# Patient Record
Sex: FEMALE | Race: WHITE | NOT HISPANIC OR LATINO | Employment: OTHER | URBAN - NONMETROPOLITAN AREA
[De-identification: names, ages, dates, MRNs, and addresses within clinical notes are randomized per-mention and may not be internally consistent; named-entity substitution may affect disease eponyms.]

---

## 2017-01-04 ENCOUNTER — TELEPHONE (OUTPATIENT)
Dept: MEDICAL GROUP | Facility: CLINIC | Age: 67
End: 2017-01-04

## 2017-01-04 ENCOUNTER — TELEMEDICINE2 (OUTPATIENT)
Dept: MEDICAL GROUP | Age: 67
End: 2017-01-04
Payer: COMMERCIAL

## 2017-01-04 ENCOUNTER — TELEMEDICINE ORIGINATING SITE VISIT (OUTPATIENT)
Dept: MEDICAL GROUP | Facility: CLINIC | Age: 67
End: 2017-01-04
Payer: COMMERCIAL

## 2017-01-04 VITALS
SYSTOLIC BLOOD PRESSURE: 120 MMHG | WEIGHT: 172.8 LBS | RESPIRATION RATE: 16 BRPM | HEART RATE: 68 BPM | OXYGEN SATURATION: 92 % | BODY MASS INDEX: 28.79 KG/M2 | DIASTOLIC BLOOD PRESSURE: 78 MMHG | TEMPERATURE: 97.3 F | HEIGHT: 65 IN

## 2017-01-04 DIAGNOSIS — M51.26 LUMBAR DISCOGENIC PAIN SYNDROME: ICD-10-CM

## 2017-01-04 DIAGNOSIS — E03.4 HYPOTHYROIDISM DUE TO ACQUIRED ATROPHY OF THYROID: ICD-10-CM

## 2017-01-04 DIAGNOSIS — I27.20 PULMONARY HYPERTENSION (HCC): ICD-10-CM

## 2017-01-04 DIAGNOSIS — Z13.220 SCREENING CHOLESTEROL LEVEL: ICD-10-CM

## 2017-01-04 PROCEDURE — 99214 OFFICE O/P EST MOD 30 MIN: CPT | Mod: GT | Performed by: INTERNAL MEDICINE

## 2017-01-04 RX ORDER — TEMAZEPAM 30 MG/1
30 CAPSULE ORAL NIGHTLY PRN
Qty: 30 CAP | Status: CANCELLED | OUTPATIENT
Start: 2017-01-04

## 2017-01-04 RX ORDER — TEMAZEPAM 30 MG/1
30 CAPSULE ORAL NIGHTLY PRN
Qty: 30 CAP | Refills: 5 | Status: CANCELLED | OUTPATIENT
Start: 2017-01-04

## 2017-01-04 RX ORDER — POTASSIUM CHLORIDE 750 MG/1
TABLET, FILM COATED, EXTENDED RELEASE ORAL
COMMUNITY
Start: 2016-12-22 | End: 2019-05-15

## 2017-01-04 RX ORDER — RIVAROXABAN 15 MG/1
TABLET, FILM COATED ORAL
COMMUNITY
Start: 2016-11-12 | End: 2017-12-28 | Stop reason: SDUPTHER

## 2017-01-04 NOTE — MR AVS SNAPSHOT
"        Kaylynn Polo   2017 8:30 AM   Telemedicine2   MRN: [id number]    Department:  [address] [place] [address]   Dept Phone:  [phone]    Description:  Female : 1950   Provider:  Cynthia West M.D.; PENNIE FLETCHER           Reason for Visit     Follow-Up     Referral Needed Cardio / Pain / Pulm - [facility]      Allergies as of 2017     Allergen Noted Reactions    Latex 2016   Anaphylaxis    Blisters on skin    Tape 2016       Blisters on skin    Warfarin Sodium 2016   Hives      You were diagnosed with     Pulmonary hypertension (HCC)   [882461]       Hypothyroidism due to acquired atrophy of thyroid   [8829286]       Lumbar discogenic pain syndrome   [415543]       Screening cholesterol level   [084163]         Vital Signs     Blood Pressure Pulse Temperature Respirations Height Weight    120/78 mmHg 68 36.3 °C (97.3 °F) 16 1.651 m (5' 5\") 78.382 kg (172 lb 12.8 oz)    Body Mass Index Oxygen Saturation Smoking Status             28.76 kg/m2 92% Never Smoker          Basic Information     Date Of Birth Sex Race Ethnicity Preferred Language    1950 Female White Non- English      Your appointments     2017  8:00 AM   Telemedicine Clinic Established Pt with Cynthia West M.D., PENNIE FLETCHER   [address] [id number] [phone]              Problem List              ICD-10-CM Priority Class Noted - Resolved    Pulmonary hypertension (HCC) I27.2   2016 - Present    Rheumatoid arthritis (HCC) M06.9   2016 - Present    Hypertension I10   2016 - Present    Neuropathy (HCC) G62.9   2016 - Present    Iron (Fe) deficiency anemia D50.9   2016 - Present    Vitamin B12 deficiency E53.8   2016 - Present    Hypothyroidism due to acquired atrophy of thyroid E03.4   2016 - Present    Pulmonary embolism (HCC) I26.99   2016 - Present    Multiple thyroid nodules E04.2   " 6/13/2016 - Present    Lumbar discogenic pain syndrome M51.26   7/27/2016 - Present    Dysphagia R13.10   7/27/2016 - Present      Health Maintenance        Date Due Completion Dates    IMM DTaP/Tdap/Td Vaccine (1 - Tdap) 11/17/1971 ---    PAP SMEAR 11/17/1973 ---    MAMMOGRAM 11/17/1992 ---    COLONOSCOPY 11/17/2002 ---    IMM ZOSTER VACCINE 11/17/2012 ---    IMM INFLUENZA (1) 9/1/2016 ---            Current Immunizations     No immunizations on file.      Below and/or attached are the medications your provider expects you to take. Review all of your home medications and newly ordered medications with your provider and/or pharmacist. Follow medication instructions as directed by your provider and/or pharmacist. Please keep your medication list with you and share with your provider. Update the information when medications are discontinued, doses are changed, or new medications (including over-the-counter products) are added; and carry medication information at all times in the event of emergency situations     Allergies:  LATEX - Anaphylaxis     TAPE - (reactions not documented)     WARFARIN SODIUM - Hives               Medications  Valid as of: January 04, 2017 -  9:13 AM    Generic Name Brand Name Tablet Size Instructions for use    Ascorbic Acid (Tab) Vitamin C 1000 MG Take 1 Tab by mouth every day.        Calcium (Tab) OS-LUIS ARMANDO 500 500 MG Take 1,000 mg by mouth 2 times a day, with meals.        Cholecalciferol (Cap) Vitamin D3 1000 UNITS Take 1 Cap by mouth every day.        Cyanocobalamin (Tab) vitamin B-12 1000 MCG Take 1,000 mcg by mouth every day.        Dermatological Products, Misc. (Emulsion) EPICERAM          Digoxin (Tab) LANOXIN 125 MCG Take 125 mcg by mouth every day.        DiltiaZEM HCl (Tab) CARDIZEM 30 MG Take 30 mg by mouth 4 times a day.        Ferrous Sulfate (Tab) Iron 325 (65 FE) MG Take 1 Tab by mouth every day.        Folic Acid (Tab) FOLVITE 1 MG Take 1 mg by mouth every day.        Folic  Acid-Vit B6-Vit B12 (Tab) VIRT-GILLIAN 2.5-25-1 MG         Furosemide (Tab) LASIX 40 MG Take 40 mg by mouth 3 times a day.        Gabapentin (Tab) NEURONTIN 600 MG         Hydroxychloroquine Sulfate (Tab) PLAQUENIL 200 MG Take 200 mg by mouth 2 times a day.        Influenza Vac Typ A&B Surf Ant (Suspension) FLUVIRIN  ADM 0.5ML IM UTD        Leflunomide (Tab) ARAVA 20 MG Take 20 mg by mouth every day.        Levothyroxine Sodium (Tab) SYNTHROID 137 MCG         Macitentan (Tab) Macitentan 10 MG Take 1 Tab by mouth every day.        NON SPECIFIED   Rx continuous O2 at 2L n/c   New O2 concentrator  Lowest O2 sat 82%        NON SPECIFIED   Rx continuous O2 at 2L n/c  New O2 concentrator  Lowest O2 sat 82%  SOB without use of O2        Pantoprazole Sodium (Tablet Delayed Response) PROTONIX 40 MG Take 40 mg by mouth 2 times a day.        Potassium Chloride (Tab CR) KLOR-CON 10 MEQ         Potassium Chloride Jennifer CR (Tab CR) K-DUR 20 MEQ Take 20 mEq by mouth every day.        Rivaroxaban (Tab) XARELTO 20 MG Take 20 mg by mouth with dinner.        Rivaroxaban (Tab) XARELTO 15 MG         Tadalafil (PAH) (Tab) Tadalafil (PAH) 20 MG Take 2 Tabs by mouth every day.        Temazepam (Cap) RESTORIL 30 MG Take 30 mg by mouth at bedtime as needed for Sleep.        TraMADol HCl (Tab) ULTRAM 50 MG Take 50 mg by mouth every four hours as needed.        Treprostinil   Inhale 1.74 mg by mouth. Inhale 12 puffs orally four times daily   Indications: 1.74mg/2.9ml        Triamcinolone Acetonide (Cream) KENALOG 0.1 %         Umeclidinium-Vilanterol (AEROSOL POWDER, BREATH ACTIVATED) ANORO ELLIPTA 62.5-25 MCG/INH         Zinc   Take 1 Tab by mouth every day.        .                 Medicines prescribed today were sent to:     [facility] - [address] [facility] [address]    Phone: [phone] Fax: [phone]    Open 24 Hours?: No      Medication refill instructions:         If your prescription bottle indicates you have medication refills left, it is not necessary to call your provider’s office. Please contact your pharmacy and they will refill your medication.    If your prescription bottle indicates you do not have any refills left, you may request refills at any time through one of the following ways: The online [employer] system (except Urgent Care), by calling your provider’s office, or by asking your pharmacy to contact your provider’s office with a refill request. Medication refills are processed only during regular business hours and may not be available until the next business day. Your provider may request additional information or to have a follow-up visit with you prior to refilling your medication.   *Please Note: Medication refills are assigned a new Rx number when refilled electronically. Your pharmacy may indicate that no refills were authorized even though a new prescription for the same medication is available at the pharmacy. Please request the medicine by name with the pharmacy before contacting your provider for a refill.        Your To Do List     Future Labs/Procedures Complete By Expires    LIPID PROFILE  As directed 1/5/2018         RealSelf Access Code: Activation code not generated  Current RealSelf Status: Active

## 2017-01-04 NOTE — TELEPHONE ENCOUNTER
Patient called with names of specialists at [facility] for you to refer her to:  Dr. Chintan Latham Orthopedics and Dr. Octaviano Perdomo Cardio/Pulmonary

## 2017-01-05 DIAGNOSIS — M48.061 FORAMINAL STENOSIS OF LUMBAR REGION: ICD-10-CM

## 2017-01-09 NOTE — PROGRESS NOTES
CC: Follow-up low back pain, pulmonary hypertension.    HPI:   Kaylynn presents today with the following.    1. Pulmonary hypertension (HCC)  Patient regularly follows up with [facility]. Patient on transplant list. Patient undergoing a drug study program. Patient stable on Xarelto. Oxygen dependent. Complains of mild shortness of breath which is chronic.  Appointment on January 30. Stable. No new changes in plan of care    2. Hypothyroidism due to acquired atrophy of thyroid  Patient currently taking Synthroid 137 MCG daily. Requesting lab work. Complains of fatigue.    3. Lumbar discogenic pain syndrome  Diagnosis of foraminal stenosis. Moderately severe. Patient underwent pre-evaluation for possible surgical intervention. Patient completed EKG and echocardiogram however given the results, surgery was declined by anesthesiology. Patient is currently followed by pain management physician, Dr. Reid in [place]. Patient's pain level has increased steadily and is interfering with her daily activities. Patient is requesting a 2nd opinion for surgical intervention.    4. Screening cholesterol level  No labs in quite some time. Not on cholesterol medications.      Patient Active Problem List    Diagnosis Date Noted   • Lumbar discogenic pain syndrome 07/27/2016   • Dysphagia 07/27/2016   • Pulmonary hypertension (HCC) 06/13/2016   • Rheumatoid arthritis (HCC) 06/13/2016   • Hypertension 06/13/2016   • Neuropathy (HCC) 06/13/2016   • Iron (Fe) deficiency anemia 06/13/2016   • Vitamin B12 deficiency 06/13/2016   • Hypothyroidism due to acquired atrophy of thyroid 06/13/2016   • Pulmonary embolism (HCC) 06/13/2016   • Multiple thyroid nodules 06/13/2016       Current Outpatient Prescriptions   Medication Sig Dispense Refill   • potassium chloride ER (KLOR-CON) 10 MEQ tablet      • XARELTO 15 MG Tab tablet      • Dermatological Products, Misc. (EPICERAM) Emulsion      • triamcinolone acetonide (KENALOG) 0.1 % Cream      •  levothyroxine (SYNTHROID) 137 MCG Tab      • VIRT-GILLIAN 2.5-25-1 MG Tab      • gabapentin (NEURONTIN) 600 MG tablet      • ANORO ELLIPTA 62.5-25 MCG/INH AEROSOL POWDER, BREATH ACTIVATED      • Tadalafil, PAH, 20 MG Tab Take 2 Tabs by mouth every day.     • calcium carbonate (OS-LUIS ARMANDO 500) 500 MG Tab Take 1,000 mg by mouth 2 times a day, with meals.     • Cholecalciferol (VITAMIN D3) 1000 UNITS Cap Take 1 Cap by mouth every day.     • digoxin (LANOXIN) 125 MCG Tab Take 125 mcg by mouth every day.     • diltiazem (CARDIZEM) 30 MG Tab Take 30 mg by mouth 4 times a day.     • folic acid (FOLVITE) 1 MG Tab Take 1 mg by mouth every day.     • furosemide (LASIX) 40 MG Tab Take 40 mg by mouth 3 times a day.     • hydroxychloroquine (PLAQUENIL) 200 MG Tab Take 200 mg by mouth 2 times a day.     • Ferrous Sulfate (IRON) 325 (65 FE) MG Tab Take 1 Tab by mouth every day.     • leflunomide (ARAVA) 20 MG Tab Take 20 mg by mouth every day.     • Macitentan 10 MG Tab Take 1 Tab by mouth every day.     • pantoprazole (PROTONIX) 40 MG Tablet Delayed Response Take 40 mg by mouth 2 times a day.     • potassium chloride SA (K-DUR) 20 MEQ Tab CR Take 20 mEq by mouth every day.     • temazepam (RESTORIL) 30 MG capsule Take 30 mg by mouth at bedtime as needed for Sleep.     • tramadol (ULTRAM) 50 MG Tab Take 50 mg by mouth every four hours as needed.     • Cyanocobalamin (VITAMIN B-12) 1000 MCG Tab Take 1,000 mcg by mouth every day.     • Ascorbic Acid (VITAMIN C) 1000 MG Tab Take 1 Tab by mouth every day.     • rivaroxaban (XARELTO) 20 MG Tab tablet Take 20 mg by mouth with dinner.     • Treprostinil (TYVASO INH) Inhale 1.74 mg by mouth. Inhale 12 puffs orally four times daily   Indications: 1.74mg/2.9ml     • FLUVIRIN Suspension ADM 0.5ML IM UTD  0   • NON SPECIFIED Rx continuous O2 at 2L n/c  New O2 concentrator  Lowest O2 sat 82%  SOB without use of O2 1 Each 11   • NON SPECIFIED Rx continuous O2 at 2L n/c   New O2 concentrator  Lowest O2  "sat 82% 1 Device 6   • CHELATED ZINC PO Take 1 Tab by mouth every day.       No current facility-administered medications for this visit.         Allergies as of 01/04/2017 - Pedro Luis as Reviewed 01/04/2017   Allergen Reaction Noted   • Latex Anaphylaxis 06/13/2016   • Tape  06/13/2016   • Warfarin sodium Hives 06/13/2016        ROS: As per HPI. Low back pain, lower extremity radiculopathy. Occasional paresthesias lower extremities bilaterally. Fatigue.    /78 mmHg  Pulse 68  Temp(Src) 36.3 °C (97.3 °F)  Resp 16  Ht 1.651 m (5' 5\")  Wt 78.382 kg (172 lb 12.8 oz)  BMI 28.76 kg/m2  SpO2 92%    Physical Exam:  Gen:         Alert and oriented, No apparent distress.  Neck:        No Lymphadenopathy or Bruits.  Lungs:     Mildly decreased breath sounds throughout, noted air excursion. Minimal crackles at right base   CV:          Regular rate and rhythm. No murmurs, rubs or gallops.  Abd:         Soft non tender, non distended. Normal active bowel sounds.  No  Hepatosplenomegaly, No pulsatile masses.                   Ext:          No clubbing, cyanosis, edema.      Assessment and Plan.   66 y.o. female with the following issues.    1. Pulmonary hypertension (HCC)  [facility] records reviewed  Keep follow up appointment in January  Continue plan of care    2. Hypothyroidism due to acquired atrophy of thyroid    - THYROID PANEL WITH TSH    3. Lumbar discogenic pain syndrome  Referral to be placed for reevaluation per orthopedics/neurosurgery.  Orthopedic records from [place] reviewed. Available in media  Continue with pain management and [facility]    4. Screening cholesterol level    - LIPID PROFILE; Future      "

## 2017-01-11 ENCOUNTER — TELEPHONE (OUTPATIENT)
Dept: MEDICAL GROUP | Facility: CLINIC | Age: 67
End: 2017-01-11

## 2017-01-12 DIAGNOSIS — I27.20 PULMONARY HYPERTENSION (HCC): ICD-10-CM

## 2017-01-12 RX ORDER — TEMAZEPAM 30 MG/1
30 CAPSULE ORAL NIGHTLY PRN
Qty: 30 CAP | Status: CANCELLED | OUTPATIENT
Start: 2017-01-12

## 2017-01-12 NOTE — TELEPHONE ENCOUNTER
Patient needs referral for Pulmonology through [facility] Dr. Octaviano Perdomo [phone].   Please send referral ASAP.  Patient says thank you!

## 2017-01-26 NOTE — TELEPHONE ENCOUNTER
Patient is requesting refills of Levothyroxine and Temazepam be sent to [facility].  She has lab results in  for the TSH

## 2017-01-27 RX ORDER — TEMAZEPAM 30 MG/1
30 CAPSULE ORAL NIGHTLY PRN
Qty: 90 CAP | Refills: 0 | Status: SHIPPED
Start: 2017-01-27 | End: 2017-02-17 | Stop reason: SDUPTHER

## 2017-01-27 RX ORDER — LEVOTHYROXINE SODIUM 137 UG/1
137 TABLET ORAL
Qty: 90 TAB | Refills: 0 | Status: SHIPPED | OUTPATIENT
Start: 2017-01-27 | End: 2017-02-27 | Stop reason: SDUPTHER

## 2017-02-07 RX ORDER — LEVOTHYROXINE SODIUM 137 UG/1
TABLET ORAL
OUTPATIENT
Start: 2017-02-07

## 2017-02-08 ENCOUNTER — TELEPHONE (OUTPATIENT)
Dept: MEDICAL GROUP | Facility: PHYSICIAN GROUP | Age: 67
End: 2017-02-08

## 2017-02-09 RX ORDER — LEVOTHYROXINE SODIUM 137 UG/1
TABLET ORAL
OUTPATIENT
Start: 2017-02-09

## 2017-02-13 RX ORDER — TEMAZEPAM 30 MG/1
30 CAPSULE ORAL NIGHTLY PRN
Qty: 90 CAP | Refills: 0 | Status: CANCELLED
Start: 2017-02-13

## 2017-02-17 RX ORDER — TEMAZEPAM 30 MG/1
30 CAPSULE ORAL NIGHTLY PRN
Qty: 90 CAP | Refills: 0 | Status: SHIPPED
Start: 2017-02-17 | End: 2017-04-21 | Stop reason: SDUPTHER

## 2017-02-22 NOTE — TELEPHONE ENCOUNTER
Patient had labs drawn in January, her results are in .  She is requesting you send a year's supply of Levothyroxine refill to her [facility] pharmacy.

## 2017-02-23 RX ORDER — LEVOTHYROXINE SODIUM 137 UG/1
137 TABLET ORAL
Qty: 90 TAB | Refills: 0 | OUTPATIENT
Start: 2017-02-23

## 2017-02-23 RX ORDER — ALPRAZOLAM 0.5 MG/1
0.05 TABLET ORAL
COMMUNITY
Start: 2017-01-04 | End: 2017-04-05 | Stop reason: CLARIF

## 2017-02-27 RX ORDER — LEVOTHYROXINE SODIUM 137 UG/1
137 TABLET ORAL
Qty: 90 TAB | Refills: 0 | Status: SHIPPED | OUTPATIENT
Start: 2017-02-27 | End: 2017-05-03 | Stop reason: SDUPTHER

## 2017-03-20 ENCOUNTER — NON-PROVIDER VISIT (OUTPATIENT)
Dept: MEDICAL GROUP | Facility: CLINIC | Age: 67
End: 2017-03-20
Payer: COMMERCIAL

## 2017-03-20 ENCOUNTER — TELEPHONE (OUTPATIENT)
Dept: MEDICAL GROUP | Facility: CLINIC | Age: 67
End: 2017-03-20

## 2017-03-20 DIAGNOSIS — D50.0 IRON DEFICIENCY ANEMIA SECONDARY TO BLOOD LOSS (CHRONIC): ICD-10-CM

## 2017-03-20 DIAGNOSIS — E03.4 HYPOTHYROIDISM DUE TO ACQUIRED ATROPHY OF THYROID: ICD-10-CM

## 2017-03-20 PROCEDURE — 36415 COLL VENOUS BLD VENIPUNCTURE: CPT | Performed by: FAMILY MEDICINE

## 2017-03-20 NOTE — TELEPHONE ENCOUNTER
Please see  for scanned in copy of lab orders from Dr. Agustin Waldrop.  He has ordered a CBC with differential and Ferritin, diag code d50.0.  Please enter these orders into [facility] so we can draw patient

## 2017-03-20 NOTE — PROGRESS NOTES
Kaylynn Polo is a 66 y.o. female here for a non-provider visit for Venipuncture    If abnormal was an in office provider notified today (if so, indicate provider)? Yes, Dr. West and Dr. Agustin Waldrop    Routed to PCP? Yes    Venipuncture drawn, packaged and sent with [facility]

## 2017-03-20 NOTE — MR AVS SNAPSHOT
Kaylynn Polo   3/20/2017 11:00 AM   Non-Provider Visit   MRN: [id number]    Department:  [facility]   Dept Phone:  [phone]    Description:  Female : 1950   Provider:  JUSTINE BUTTS           Reason for Visit     Other Venipuncture      Allergies as of 3/20/2017     Allergen Noted Reactions    Latex 2016   Anaphylaxis    Blisters on skin    Tape 2016       Blisters on skin    Warfarin Sodium 2016   Hives      You were diagnosed with     Hypothyroidism due to acquired atrophy of thyroid   [9387237]         Vital Signs     Smoking Status                   Never Smoker            Basic Information     Date Of Birth Sex Race Ethnicity Preferred Language    1950 Female White Non- English      Your appointments     Mar 20, 2017 11:00 AM   Non Provider 1 with JUSTINE BUTTS   [facility] (--)    [address] [facility] [address]   [phone]           You will be receiving a confirmation call a few days before your appointment from our automated call confirmation system.            2017  8:00 AM   Telemedicine Clinic Established Pt with Cynthia West M.D., [facility] [address] [place] [id number] [phone]              Problem List              ICD-10-CM Priority Class Noted - Resolved    Pulmonary hypertension (CMS-HCC) I27.2   2016 - Present    Rheumatoid arthritis (CMS-HCC) M06.9   2016 - Present    Hypertension I10   2016 - Present    Neuropathy (CMS-HCC) G62.9   2016 - Present    Iron (Fe) deficiency anemia D50.9   2016 - Present    Vitamin B12 deficiency E53.8   2016 - Present    Hypothyroidism due to acquired atrophy of thyroid E03.4   2016 - Present    Pulmonary embolism (CMS-HCC) I26.99   2016 - Present    Multiple thyroid nodules E04.2   2016 - Present    Lumbar discogenic pain syndrome M51.26   2016 - Present    Dysphagia R13.10   7/27/2016 - Present      Health Maintenance        Date Due Completion Dates    IMM DTaP/Tdap/Td Vaccine (1 - Tdap) 11/17/1971 ---    PAP SMEAR 11/17/1973 ---    MAMMOGRAM 11/17/1992 ---    COLONOSCOPY 11/17/2002 ---    IMM ZOSTER VACCINE 11/17/2012 ---    IMM INFLUENZA (1) 9/1/2016 ---            Current Immunizations     No immunizations on file.      Below and/or attached are the medications your provider expects you to take. Review all of your home medications and newly ordered medications with your provider and/or pharmacist. Follow medication instructions as directed by your provider and/or pharmacist. Please keep your medication list with you and share with your provider. Update the information when medications are discontinued, doses are changed, or new medications (including over-the-counter products) are added; and carry medication information at all times in the event of emergency situations     Allergies:  LATEX - Anaphylaxis     TAPE - (reactions not documented)     WARFARIN SODIUM - Hives               Medications  Valid as of: March 20, 2017 - 10:54 AM    Generic Name Brand Name Tablet Size Instructions for use    ALPRAZolam (Tab) XANAX 0.5 MG Take 0.05 mg by mouth.        Ascorbic Acid (Tab) Vitamin C 1000 MG Take 1 Tab by mouth every day.        Calcium (Tab) OS-LUIS ARMANDO 500 500 MG Take 1,000 mg by mouth 2 times a day, with meals.        Cholecalciferol (Cap) Vitamin D3 1000 UNITS Take 1 Cap by mouth every day.        Cyanocobalamin (Tab) vitamin B-12 1000 MCG Take 1,000 mcg by mouth every day.        Dermatological Products, Misc. (Emulsion) EPICERAM          Digoxin (Tab) LANOXIN 125 MCG Take 125 mcg by mouth every day.        DiltiaZEM HCl (Tab) CARDIZEM 30 MG Take 30 mg by mouth 4 times a day.        Ferrous Sulfate (Tab) Iron 325 (65 FE) MG Take 1 Tab by mouth every day.        Folic Acid (Tab) FOLVITE 1 MG Take 1 mg by mouth every day.        Folic Acid-Vit B6-Vit B12 (Tab)  VIRT-GILLIAN 2.5-25-1 MG         Furosemide (Tab) LASIX 40 MG Take 40 mg by mouth 3 times a day.        Gabapentin (Tab) NEURONTIN 600 MG         Hydroxychloroquine Sulfate (Tab) PLAQUENIL 200 MG Take 200 mg by mouth 2 times a day.        Influenza Vac Typ A&B Surf Ant (Suspension) FLUVIRIN  ADM 0.5ML IM UTD        Leflunomide (Tab) ARAVA 20 MG Take 20 mg by mouth every day.        Levothyroxine Sodium (Tab) SYNTHROID 137 MCG Take 1 Tab by mouth Every morning on an empty stomach.        Macitentan (Tab) Macitentan 10 MG Take 1 Tab by mouth every day.        NON SPECIFIED   Rx continuous O2 at 2L n/c   New O2 concentrator  Lowest O2 sat 82%        NON SPECIFIED   Rx continuous O2 at 2L n/c  New O2 concentrator  Lowest O2 sat 82%  SOB without use of O2        Pantoprazole Sodium (Tablet Delayed Response) PROTONIX 40 MG Take 40 mg by mouth 2 times a day.        Potassium Chloride (Tab CR) KLOR-CON 10 MEQ         Potassium Chloride Jennifer CR (Tab CR) Kdur 20 MEQ Take 20 mEq by mouth every day.        Rivaroxaban (Tab) XARELTO 20 MG Take 20 mg by mouth with dinner.        Rivaroxaban (Tab) XARELTO 15 MG         Tadalafil (PAH) (Tab) Tadalafil (PAH) 20 MG Take 2 Tabs by mouth every day.        Temazepam (Cap) RESTORIL 30 MG Take 1 Cap by mouth at bedtime as needed for Sleep.        TraMADol HCl (Tab) ULTRAM 50 MG Take 50 mg by mouth every four hours as needed.        Treprostinil   Inhale 1.74 mg by mouth. Inhale 12 puffs orally four times daily   Indications: 1.74mg/2.9ml        Triamcinolone Acetonide (Cream) KENALOG 0.1 %         Umeclidinium-Vilanterol (AEROSOL POWDER, BREATH ACTIVATED) ANORO ELLIPTA 62.5-25 MCG/INH         Zinc   Take 1 Tab by mouth every day.        .                 Medicines prescribed today were sent to:     [facility] - [address] [facility] [address]    Phone: [phone] Fax: [phone]    Open 24 Hours?: No      Medication  refill instructions:       If your prescription bottle indicates you have medication refills left, it is not necessary to call your provider’s office. Please contact your pharmacy and they will refill your medication.    If your prescription bottle indicates you do not have any refills left, you may request refills at any time through one of the following ways: The online [email or web address] system (except Urgent Care), by calling your provider’s office, or by asking your pharmacy to contact your provider’s office with a refill request. Medication refills are processed only during regular business hours and may not be available until the next business day. Your provider may request additional information or to have a follow-up visit with you prior to refilling your medication.   *Please Note: Medication refills are assigned a new Rx number when refilled electronically. Your pharmacy may indicate that no refills were authorized even though a new prescription for the same medication is available at the pharmacy. Please request the medicine by name with the pharmacy before contacting your provider for a refill.           [email or web address] Access Code: Activation code not generated  Current [email or web address] Status: Active

## 2017-04-05 ENCOUNTER — NON-PROVIDER VISIT (OUTPATIENT)
Dept: MEDICAL GROUP | Facility: CLINIC | Age: 67
End: 2017-04-05
Payer: COMMERCIAL

## 2017-04-05 ENCOUNTER — APPOINTMENT (OUTPATIENT)
Dept: RADIOLOGY | Facility: IMAGING CENTER | Age: 67
End: 2017-04-05
Attending: INTERNAL MEDICINE
Payer: COMMERCIAL

## 2017-04-05 ENCOUNTER — TELEMEDICINE ORIGINATING SITE VISIT (OUTPATIENT)
Dept: MEDICAL GROUP | Facility: CLINIC | Age: 67
End: 2017-04-05
Payer: COMMERCIAL

## 2017-04-05 ENCOUNTER — TELEMEDICINE2 (OUTPATIENT)
Dept: MEDICAL GROUP | Age: 67
End: 2017-04-05
Payer: COMMERCIAL

## 2017-04-05 VITALS
TEMPERATURE: 98.6 F | DIASTOLIC BLOOD PRESSURE: 76 MMHG | HEART RATE: 82 BPM | HEIGHT: 65 IN | OXYGEN SATURATION: 87 % | WEIGHT: 169.9 LBS | RESPIRATION RATE: 20 BRPM | SYSTOLIC BLOOD PRESSURE: 136 MMHG | BODY MASS INDEX: 28.31 KG/M2

## 2017-04-05 DIAGNOSIS — J20.1 ACUTE BRONCHITIS DUE TO HAEMOPHILUS INFLUENZAE: ICD-10-CM

## 2017-04-05 DIAGNOSIS — I27.20 PULMONARY HYPERTENSION (HCC): ICD-10-CM

## 2017-04-05 DIAGNOSIS — R06.02 SOB (SHORTNESS OF BREATH): ICD-10-CM

## 2017-04-05 DIAGNOSIS — I10 ESSENTIAL HYPERTENSION: ICD-10-CM

## 2017-04-05 DIAGNOSIS — G62.9 NEUROPATHY: ICD-10-CM

## 2017-04-05 LAB — EKG 4674: NORMAL

## 2017-04-05 PROCEDURE — 99214 OFFICE O/P EST MOD 30 MIN: CPT | Mod: GT | Performed by: INTERNAL MEDICINE

## 2017-04-05 PROCEDURE — 71020 DX-CHEST-2 VIEWS: CPT | Mod: TC | Performed by: FAMILY MEDICINE

## 2017-04-05 PROCEDURE — 93005 ELECTROCARDIOGRAM TRACING: CPT | Performed by: FAMILY MEDICINE

## 2017-04-05 RX ORDER — ALBUTEROL SULFATE 90 UG/1
2 AEROSOL, METERED RESPIRATORY (INHALATION) EVERY 6 HOURS PRN
Qty: 8.5 G | Refills: 1 | Status: SHIPPED | OUTPATIENT
Start: 2017-04-05 | End: 2019-04-24

## 2017-04-05 RX ORDER — AMOXICILLIN 875 MG/1
875 TABLET, COATED ORAL 2 TIMES DAILY
Qty: 14 TAB | Refills: 0 | Status: SHIPPED | OUTPATIENT
Start: 2017-04-05 | End: 2019-05-15

## 2017-04-05 NOTE — MR AVS SNAPSHOT
Kaylynn Polo   2017 9:30 AM   Non-Provider Visit   MRN: [id number]    Department:  [facility]   Dept Phone:  [phone]    Description:  Female : 1950   Provider:  JUSTINE BUTTS           Reason for Visit     Other EKG      Allergies as of 2017     Allergen Noted Reactions    Latex 2016   Anaphylaxis    Blisters on skin    Tape 2016       Blisters on skin    Warfarin Sodium 2016   Hives      You were diagnosed with     Essential hypertension   [7085896]         Vital Signs     Smoking Status                   Never Smoker            Basic Information     Date Of Birth Sex Race Ethnicity Preferred Language    1950 Female White Non- English      Problem List              ICD-10-CM Priority Class Noted - Resolved    Pulmonary hypertension (CMS-HCC) I27.2   2016 - Present    Rheumatoid arthritis (CMS-HCC) M06.9   2016 - Present    Hypertension I10   2016 - Present    Neuropathy (CMS-HCC) G62.9   2016 - Present    Iron (Fe) deficiency anemia D50.9   2016 - Present    Vitamin B12 deficiency E53.8   2016 - Present    Hypothyroidism due to acquired atrophy of thyroid E03.4   2016 - Present    Pulmonary embolism (CMS-HCC) I26.99   2016 - Present    Multiple thyroid nodules E04.2   2016 - Present    Lumbar discogenic pain syndrome M51.26   2016 - Present    Dysphagia R13.10   2016 - Present    SOB (shortness of breath) R06.02   2017 - Present    Acute bronchitis due to Haemophilus influenzae J20.1   2017 - Present      Health Maintenance        Date Due Completion Dates    IMM DTaP/Tdap/Td Vaccine (1 - Tdap) 1971 ---    PAP SMEAR 1973 ---    MAMMOGRAM 1992 ---    COLONOSCOPY 2002 ---    IMM ZOSTER VACCINE 2012 ---            Current Immunizations     No immunizations on file.      Below and/or attached are the medications your provider expects you to take. Review all of your  home medications and newly ordered medications with your provider and/or pharmacist. Follow medication instructions as directed by your provider and/or pharmacist. Please keep your medication list with you and share with your provider. Update the information when medications are discontinued, doses are changed, or new medications (including over-the-counter products) are added; and carry medication information at all times in the event of emergency situations     Allergies:  LATEX - Anaphylaxis     TAPE - (reactions not documented)     WARFARIN SODIUM - Hives               Medications  Valid as of: April 05, 2017 -  9:38 AM    Generic Name Brand Name Tablet Size Instructions for use    Amoxicillin (Tab) AMOXIL 875 MG Take 1 Tab by mouth 2 times a day.        Ascorbic Acid (Tab) Vitamin C 1000 MG Take 1 Tab by mouth every day.        Calcium (Tab) OS-LUIS ARMANDO 500 500 MG Take 1,000 mg by mouth 2 times a day, with meals.        Cholecalciferol (Cap) Vitamin D3 1000 UNITS Take 1 Cap by mouth every day.        Cyanocobalamin (Tab) vitamin B-12 1000 MCG Take 1,000 mcg by mouth every day.        Digoxin (Tab) LANOXIN 125 MCG Take 125 mcg by mouth every day.        DiltiaZEM HCl (Tab) CARDIZEM 30 MG Take 30 mg by mouth 4 times a day.        Ferrous Sulfate (Tab) Iron 325 (65 FE) MG Take 1 Tab by mouth every day.        Folic Acid (Tab) FOLVITE 1 MG Take 1 mg by mouth every day.        Folic Acid-Vit B6-Vit B12 (Tab) VIRT-GILLIAN 2.5-25-1 MG         Furosemide (Tab) LASIX 40 MG Take 40 mg by mouth 3 times a day.        Gabapentin (Tab) NEURONTIN 600 MG         Hydroxychloroquine Sulfate (Tab) PLAQUENIL 200 MG Take 200 mg by mouth 2 times a day.        Leflunomide (Tab) ARAVA 20 MG Take 20 mg by mouth every day.        Levothyroxine Sodium (Tab) SYNTHROID 137 MCG Take 1 Tab by mouth Every morning on an empty stomach.        Macitentan (Tab) Macitentan 10 MG Take 1 Tab by mouth every day.        NON SPECIFIED   Rx continuous O2 at 2L  n/c   New O2 concentrator  Lowest O2 sat 82%        NON SPECIFIED   Rx continuous O2 at 2L n/c  New O2 concentrator  Lowest O2 sat 82%  SOB without use of O2        Pantoprazole Sodium (Tablet Delayed Response) PROTONIX 40 MG Take 40 mg by mouth 2 times a day.        Potassium Chloride (Tab CR) KLOR-CON 10 MEQ         Potassium Chloride Jennifer CR (Tab CR) Kdur 20 MEQ Take 20 mEq by mouth every day.        Rivaroxaban (Tab) XARELTO 20 MG Take 15 mg by mouth with dinner.        Rivaroxaban (Tab) XARELTO 15 MG         Tadalafil (PAH) (Tab) Tadalafil (PAH) 20 MG Take 2 Tabs by mouth every day.        Temazepam (Cap) RESTORIL 30 MG Take 1 Cap by mouth at bedtime as needed for Sleep.        TraMADol HCl (Tab) ULTRAM 50 MG Take 50 mg by mouth every four hours as needed.        Treprostinil   Inhale 1.74 mg by mouth. Inhale 12 puffs orally four times daily   Indications: 1.74mg/2.9ml        Triamcinolone Acetonide (Cream) KENALOG 0.1 %         Umeclidinium-Vilanterol (AEROSOL POWDER, BREATH ACTIVATED) ANORO ELLIPTA 62.5-25 MCG/INH         .                 Medicines prescribed today were sent to:     [facility] - [address] [facility] [address]    Phone: [phone] Fax: [phone]    Open 24 Hours?: No      Medication refill instructions:       If your prescription bottle indicates you have medication refills left, it is not necessary to call your provider’s office. Please contact your pharmacy and they will refill your medication.    If your prescription bottle indicates you do not have any refills left, you may request refills at any time through one of the following ways: The online [employer] system (except Urgent Care), by calling your provider’s office, or by asking your pharmacy to contact your provider’s office with a refill request. Medication refills are processed only during regular business hours and may not be available until the next  business day. Your provider may request additional information or to have a follow-up visit with you prior to refilling your medication.   *Please Note: Medication refills are assigned a new Rx number when refilled electronically. Your pharmacy may indicate that no refills were authorized even though a new prescription for the same medication is available at the pharmacy. Please request the medicine by name with the pharmacy before contacting your provider for a refill.           [employer] Access Code: Activation code not generated  Current [employer] Status: Active

## 2017-04-05 NOTE — PROGRESS NOTES
CC: Shortness of breath    Verified identification with patient. Secured video conference with RN presenter in [facility]      HPI:   Kaylynn presents today with the following.    1. Pulmonary hypertension (CMS-HCC)  Patient regularly follows up with [facility]. Patient on transplant list. Patient undergoing a drug study program. Patient stable on Xarelto. Oxygen dependent. Her most recent visit to [facility] was January 2 017. Xarelto decreased to 15 mg dose.    2. SOB (shortness of breath)   Patient comes in with complaints of increasing shortness of breath, sharp chest pain and right ankle swelling for the last 4 days. She is on chronic oxygen at 2 L nasal cannula daily. She has noted that her pulse ox has decreased to the low 80s. She complains of associated chills. Her  is sick with bronchitis. She has a history of PE from September 2 013 and is concerned because her symptoms are similar.    3. Neuropathy (CMS-HCC)  Noticed neuropathy symptoms increased in her legs and now in her hands bilaterally.          Patient Active Problem List    Diagnosis Date Noted   • SOB (shortness of breath) 04/05/2017   • Acute bronchitis due to Haemophilus influenzae 04/05/2017   • Lumbar discogenic pain syndrome 07/27/2016   • Dysphagia 07/27/2016   • Pulmonary hypertension (CMS-HCC) 06/13/2016   • Rheumatoid arthritis (CMS-HCC) 06/13/2016   • Hypertension 06/13/2016   • Neuropathy (CMS-HCC) 06/13/2016   • Iron (Fe) deficiency anemia 06/13/2016   • Vitamin B12 deficiency 06/13/2016   • Hypothyroidism due to acquired atrophy of thyroid 06/13/2016   • Pulmonary embolism (CMS-HCC) 06/13/2016   • Multiple thyroid nodules 06/13/2016       Current Outpatient Prescriptions   Medication Sig Dispense Refill   • amoxicillin (AMOXIL) 875 MG tablet Take 1 Tab by mouth 2 times a day. 14 Tab 0   • albuterol 108 (90 BASE) MCG/ACT Aero Soln inhalation aerosol Inhale 2 Puffs by mouth every 6 hours as needed for Shortness of Breath. 8.5 g 1   •  amoxicillin (AMOXIL) 875 MG tablet Take 1 Tab by mouth 2 times a day. 14 Tab 0   • levothyroxine (SYNTHROID) 137 MCG Tab Take 1 Tab by mouth Every morning on an empty stomach. 90 Tab 0   • temazepam (RESTORIL) 30 MG capsule Take 1 Cap by mouth at bedtime as needed for Sleep. 90 Cap 0   • potassium chloride ER (KLOR-CON) 10 MEQ tablet      • XARELTO 15 MG Tab tablet      • triamcinolone acetonide (KENALOG) 0.1 % Cream      • VIRT-GILLIAN 2.5-25-1 MG Tab      • gabapentin (NEURONTIN) 600 MG tablet      • ANORO ELLIPTA 62.5-25 MCG/INH AEROSOL POWDER, BREATH ACTIVATED      • Tadalafil, PAH, 20 MG Tab Take 2 Tabs by mouth every day.     • calcium carbonate (OS-LUIS ARMANDO 500) 500 MG Tab Take 1,000 mg by mouth 2 times a day, with meals.     • Cholecalciferol (VITAMIN D3) 1000 UNITS Cap Take 1 Cap by mouth every day.     • digoxin (LANOXIN) 125 MCG Tab Take 125 mcg by mouth every day.     • diltiazem (CARDIZEM) 30 MG Tab Take 30 mg by mouth 4 times a day.     • folic acid (FOLVITE) 1 MG Tab Take 1 mg by mouth every day.     • furosemide (LASIX) 40 MG Tab Take 40 mg by mouth 3 times a day.     • hydroxychloroquine (PLAQUENIL) 200 MG Tab Take 200 mg by mouth 2 times a day.     • Ferrous Sulfate (IRON) 325 (65 FE) MG Tab Take 1 Tab by mouth every day.     • leflunomide (ARAVA) 20 MG Tab Take 20 mg by mouth every day.     • Macitentan 10 MG Tab Take 1 Tab by mouth every day.     • pantoprazole (PROTONIX) 40 MG Tablet Delayed Response Take 40 mg by mouth 2 times a day.     • potassium chloride SA (K-DUR) 20 MEQ Tab CR Take 20 mEq by mouth every day.     • tramadol (ULTRAM) 50 MG Tab Take 50 mg by mouth every four hours as needed.     • Cyanocobalamin (VITAMIN B-12) 1000 MCG Tab Take 1,000 mcg by mouth every day.     • Ascorbic Acid (VITAMIN C) 1000 MG Tab Take 1 Tab by mouth every day.     • rivaroxaban (XARELTO) 20 MG Tab tablet Take 15 mg by mouth with dinner.     • Treprostinil (TYVASO INH) Inhale 1.74 mg by mouth. Inhale 12 puffs  "orally four times daily   Indications: 1.74mg/2.9ml     • NON SPECIFIED Rx continuous O2 at 2L n/c  New O2 concentrator  Lowest O2 sat 82%  SOB without use of O2 1 Each 11   • NON SPECIFIED Rx continuous O2 at 2L n/c   New O2 concentrator  Lowest O2 sat 82% 1 Device 6     No current facility-administered medications for this visit.         Allergies as of 04/05/2017 - Pedro Luis as Reviewed 04/05/2017   Allergen Reaction Noted   • Latex Anaphylaxis 06/13/2016   • Tape  06/13/2016   • Warfarin sodium Hives 06/13/2016        ROS: As per HPI. All other symptoms cardiopulmonary, GI, neurology negative.    /80 mmHg  Pulse 74  Temp(Src) 37 °C (98.6 °F)  Resp 18  Ht 1.651 m (5' 5\")  Wt 77.066 kg (169 lb 14.4 oz)  BMI 28.27 kg/m2  SpO2 85%    Physical Exam:  Gen:         Alert and oriented, No apparent distress.  Neck:        No Lymphadenopathy.   Lungs:     Decrease in breath sounds throughout, no wheezes noted. No rhonchi. Possible crackles at the bases.  CV:          Regular rate and rhythm. No murmurs, rubs or gallops.  Abd:         Soft non tender, non distended. Normal active bowel sounds.  No  Hepatosplenomegaly, No pulsatile masses.                   Ext:          Right ankle with mild edema. Bilateral lower extremity symmetrical, no calf tenderness. Cool extremities      Assessment and Plan.   66 y.o. female with the following issues.    1. Pulmonary hypertension (CMS-HCC)  Obtain medical records from most recent visit to [facility]    2. SOB (shortness of breath)  CXR  No pulmonary infiltrates or consolidations are noted. Scattered calcified granulomas seen.  No pleural effusions, no pneumothorax are appreciated.  Prominence of the right hilum. Normal cardiopericardial silhouette-     EKG shows borderline ST, T wave abnormalities. Comparison from EKG in January from [facility] shows abnormal ST segment and T-wave abnormalities in the anterior leads.     DX-CHEST-2 VIEWS; Future  - POCT EKG    3. Neuropathy " (CMS-[facility])  Continue current therapy at this time    4. Acute bronchitis due to Haemophilus influenzae  Treat for acute bronchitis with symptoms of presentation  Titrate O2  Concern for patient with history of pulmonary hypertension and PE. Limited diagnostics and labs available in [place]. Discussed with patient recommendation to follow-up at the closest ER for further evaluation and treatment. Patient declines at this time as she has no transportation. Offered transportation via ambulance. Patient declines follow-up ER at this time and will follow-up in the clinic within the next 48 hours for reevaluation.    - amoxicillin (AMOXIL) 875 MG tablet; Take 1 Tab by mouth 2 times a day.  Dispense: 14 Tab; Refill: 0  - albuterol 108 (90 BASE) MCG/ACT Aero Soln inhalation aerosol; Inhale 2 Puffs by mouth every 6 hours as needed for Shortness of Breath.  Dispense: 8.5 g; Refill: 1  - amoxicillin (AMOXIL) 875 MG tablet; Take 1 Tab by mouth 2 times a day.  Dispense: 14 Tab; Refill: 0            Please note that this dictation was created using voice recognition software. I have made every reasonable attempt to correct obvious errors, but expect that there are errors of grammar and possible content that I did not discover before finalizing note.

## 2017-04-05 NOTE — NON-PROVIDER
Kaylynn Polo is a 66 y.o. female here for a non-provider visit for EKG.     If abnormal was an in office provider notified today (if so, indicate provider)? Yes  Routed to PCP? Yes

## 2017-04-05 NOTE — MR AVS SNAPSHOT
"        Kaylynn Polo   2017 8:00 AM   Telemedicine2   MRN: [id number]    Department:  [facility]   Dept Phone:  [phone]    Description:  Female : 1950   Provider:  Cynthia West M.D.; TELEMED TONOPAH           Reason for Visit     Follow-Up enlarged veins in lower legs    Hip Pain Right hip pain      Allergies as of 2017     Allergen Noted Reactions    Latex 2016   Anaphylaxis    Blisters on skin    Tape 2016       Blisters on skin    Warfarin Sodium 2016   Hives      You were diagnosed with     Pulmonary hypertension (CMS-HCC)   [474171]       SOB (shortness of breath)   [532509]       Neuropathy (CMS-[facility])   [[id number]]         Vital Signs     Blood Pressure Pulse Temperature Respirations Height Weight    123/80 mmHg 74 37 °C (98.6 °F) 18 1.651 m (5' 5\") 77.066 kg (169 lb 14.4 oz)    Body Mass Index Oxygen Saturation Smoking Status             28.27 kg/m2 85% Never Smoker          Basic Information     Date Of Birth Sex Race Ethnicity Preferred Language    1950 Female White Non- English      Problem List              ICD-10-CM Priority Class Noted - Resolved    Pulmonary hypertension (CMS-HCC) I27.2   2016 - Present    Rheumatoid arthritis (CMS-[facility]) M06.9   2016 - Present    Hypertension I10   2016 - Present    Neuropathy (CMS-[facility]) G62.9   2016 - Present    Iron (Fe) deficiency anemia D50.9   2016 - Present    Vitamin B12 deficiency E53.8   2016 - Present    Hypothyroidism due to acquired atrophy of thyroid E03.4   2016 - Present    Pulmonary embolism (CMS-HCC) I26.99   2016 - Present    Multiple thyroid nodules E04.2   2016 - Present    Lumbar discogenic pain syndrome M51.26   2016 - Present    Dysphagia R13.10   2016 - Present    SOB (shortness of breath) R06.02   2017 - Present      Health Maintenance        Date Due Completion Dates    IMM DTaP/Tdap/Td Vaccine (1 - Tdap) 1971 ---    PAP " SMEAR 11/17/1973 ---    MAMMOGRAM 11/17/1992 ---    COLONOSCOPY 11/17/2002 ---    IMM ZOSTER VACCINE 11/17/2012 ---            Current Immunizations     No immunizations on file.      Below and/or attached are the medications your provider expects you to take. Review all of your home medications and newly ordered medications with your provider and/or pharmacist. Follow medication instructions as directed by your provider and/or pharmacist. Please keep your medication list with you and share with your provider. Update the information when medications are discontinued, doses are changed, or new medications (including over-the-counter products) are added; and carry medication information at all times in the event of emergency situations     Allergies:  LATEX - Anaphylaxis     TAPE - (reactions not documented)     WARFARIN SODIUM - Hives               Medications  Valid as of: April 05, 2017 -  8:48 AM    Generic Name Brand Name Tablet Size Instructions for use    Ascorbic Acid (Tab) Vitamin C 1000 MG Take 1 Tab by mouth every day.        Calcium (Tab) OS-LUIS ARMANDO 500 500 MG Take 1,000 mg by mouth 2 times a day, with meals.        Cholecalciferol (Cap) Vitamin D3 1000 UNITS Take 1 Cap by mouth every day.        Cyanocobalamin (Tab) vitamin B-12 1000 MCG Take 1,000 mcg by mouth every day.        Digoxin (Tab) LANOXIN 125 MCG Take 125 mcg by mouth every day.        DiltiaZEM HCl (Tab) CARDIZEM 30 MG Take 30 mg by mouth 4 times a day.        Ferrous Sulfate (Tab) Iron 325 (65 FE) MG Take 1 Tab by mouth every day.        Folic Acid (Tab) FOLVITE 1 MG Take 1 mg by mouth every day.        Folic Acid-Vit B6-Vit B12 (Tab) VIRT-GILLIAN 2.5-25-1 MG         Furosemide (Tab) LASIX 40 MG Take 40 mg by mouth 3 times a day.        Gabapentin (Tab) NEURONTIN 600 MG         Hydroxychloroquine Sulfate (Tab) PLAQUENIL 200 MG Take 200 mg by mouth 2 times a day.        Leflunomide (Tab) ARAVA 20 MG Take 20 mg by mouth every day.         Levothyroxine Sodium (Tab) SYNTHROID 137 MCG Take 1 Tab by mouth Every morning on an empty stomach.        Macitentan (Tab) Macitentan 10 MG Take 1 Tab by mouth every day.        NON SPECIFIED   Rx continuous O2 at 2L n/c   New O2 concentrator  Lowest O2 sat 82%        NON SPECIFIED   Rx continuous O2 at 2L n/c  New O2 concentrator  Lowest O2 sat 82%  SOB without use of O2        Pantoprazole Sodium (Tablet Delayed Response) PROTONIX 40 MG Take 40 mg by mouth 2 times a day.        Potassium Chloride (Tab CR) KLOR-CON 10 MEQ         Potassium Chloride Jennifer CR (Tab CR) Kdur 20 MEQ Take 20 mEq by mouth every day.        Rivaroxaban (Tab) XARELTO 20 MG Take 15 mg by mouth with dinner.        Rivaroxaban (Tab) XARELTO 15 MG         Tadalafil (PAH) (Tab) Tadalafil (PAH) 20 MG Take 2 Tabs by mouth every day.        Temazepam (Cap) RESTORIL 30 MG Take 1 Cap by mouth at bedtime as needed for Sleep.        TraMADol HCl (Tab) ULTRAM 50 MG Take 50 mg by mouth every four hours as needed.        Treprostinil   Inhale 1.74 mg by mouth. Inhale 12 puffs orally four times daily   Indications: 1.74mg/2.9ml        Triamcinolone Acetonide (Cream) KENALOG 0.1 %         Umeclidinium-Vilanterol (AEROSOL POWDER, BREATH ACTIVATED) ANORO ELLIPTA 62.5-25 MCG/INH         .                 Medicines prescribed today were sent to:     [facility] - [address] [facility] [address]    Phone: [phone] Fax: [phone]    Open 24 Hours?: No      Medication refill instructions:       If your prescription bottle indicates you have medication refills left, it is not necessary to call your provider’s office. Please contact your pharmacy and they will refill your medication.    If your prescription bottle indicates you do not have any refills left, you may request refills at any time through one of the following ways: The online [employer] system (except Urgent Care), by calling your  provider’s office, or by asking your pharmacy to contact your provider’s office with a refill request. Medication refills are processed only during regular business hours and may not be available until the next business day. Your provider may request additional information or to have a follow-up visit with you prior to refilling your medication.   *Please Note: Medication refills are assigned a new Rx number when refilled electronically. Your pharmacy may indicate that no refills were authorized even though a new prescription for the same medication is available at the pharmacy. Please request the medicine by name with the pharmacy before contacting your provider for a refill.        Your To Do List     Future Labs/Procedures Complete By Expires    DX-CHEST-2 VIEWS  As directed 10/6/2017         [employer] Access Code: Activation code not generated  Current [employer] Status: Active

## 2017-04-11 ENCOUNTER — TELEPHONE (OUTPATIENT)
Dept: MEDICAL GROUP | Facility: CLINIC | Age: 67
End: 2017-04-11

## 2017-04-11 DIAGNOSIS — I27.20 PULMONARY HTN (HCC): ICD-10-CM

## 2017-04-11 NOTE — TELEPHONE ENCOUNTER
Patient needs lab orders per Dr. Perdomo @ [facility].    Labs needed:  CMP & BNP    Original order in Media.

## 2017-04-12 ENCOUNTER — TELEPHONE (OUTPATIENT)
Dept: MEDICAL GROUP | Facility: CLINIC | Age: 67
End: 2017-04-12

## 2017-04-12 ENCOUNTER — NON-PROVIDER VISIT (OUTPATIENT)
Dept: MEDICAL GROUP | Facility: CLINIC | Age: 67
End: 2017-04-12
Payer: COMMERCIAL

## 2017-04-12 ENCOUNTER — TELEMEDICINE ORIGINATING SITE VISIT (OUTPATIENT)
Dept: MEDICAL GROUP | Facility: CLINIC | Age: 67
End: 2017-04-12
Payer: COMMERCIAL

## 2017-04-12 ENCOUNTER — TELEMEDICINE2 (OUTPATIENT)
Dept: MEDICAL GROUP | Age: 67
End: 2017-04-12
Payer: COMMERCIAL

## 2017-04-12 VITALS
BODY MASS INDEX: 27.49 KG/M2 | TEMPERATURE: 97.5 F | SYSTOLIC BLOOD PRESSURE: 116 MMHG | DIASTOLIC BLOOD PRESSURE: 73 MMHG | WEIGHT: 165 LBS | OXYGEN SATURATION: 90 % | HEART RATE: 67 BPM | HEIGHT: 65 IN | RESPIRATION RATE: 16 BRPM

## 2017-04-12 DIAGNOSIS — I27.20 PULMONARY HYPERTENSION (HCC): ICD-10-CM

## 2017-04-12 DIAGNOSIS — D50.9 IRON DEFICIENCY ANEMIA, UNSPECIFIED IRON DEFICIENCY ANEMIA TYPE: ICD-10-CM

## 2017-04-12 DIAGNOSIS — R09.81 NASAL CONGESTION: ICD-10-CM

## 2017-04-12 DIAGNOSIS — M06.9 RHEUMATOID ARTHRITIS, INVOLVING UNSPECIFIED SITE, UNSPECIFIED RHEUMATOID FACTOR PRESENCE: ICD-10-CM

## 2017-04-12 DIAGNOSIS — J20.1 ACUTE BRONCHITIS DUE TO HAEMOPHILUS INFLUENZAE: ICD-10-CM

## 2017-04-12 PROCEDURE — 36415 COLL VENOUS BLD VENIPUNCTURE: CPT | Performed by: FAMILY MEDICINE

## 2017-04-12 PROCEDURE — 99214 OFFICE O/P EST MOD 30 MIN: CPT | Mod: GT | Performed by: INTERNAL MEDICINE

## 2017-04-12 RX ORDER — FLUTICASONE PROPIONATE 50 MCG
1 SPRAY, SUSPENSION (ML) NASAL DAILY
Qty: 16 G | Refills: 3 | Status: SHIPPED | OUTPATIENT
Start: 2017-04-12 | End: 2017-05-08 | Stop reason: SDUPTHER

## 2017-04-12 ASSESSMENT — PATIENT HEALTH QUESTIONNAIRE - PHQ9: CLINICAL INTERPRETATION OF PHQ2 SCORE: 0

## 2017-04-12 NOTE — MR AVS SNAPSHOT
"        Kaylynn Polo   2017 9:30 AM   Telemedicine2   MRN: [id number]    Department:  [address] [place] [address]   Dept Phone:  [phone]    Description:  Female : 1950   Provider:  Cynthia West M.D.; PENNIE FLETCHER           Reason for Visit     Follow-Up           Allergies as of 2017     Allergen Noted Reactions    Latex 2016   Anaphylaxis    Blisters on skin    Tape 2016       Blisters on skin    Warfarin Sodium 2016   Hives      You were diagnosed with     Pulmonary hypertension (CMS-HCC)   [587246]       Acute bronchitis due to Haemophilus influenzae   [098990]       Rheumatoid arthritis, involving unspecified site, unspecified rheumatoid factor presence (CMS-HCC)   [6230558]       Iron deficiency anemia, unspecified iron deficiency anemia type   [6420694]         Vital Signs     Blood Pressure Pulse Temperature Respirations Height Weight    116/73 mmHg 67 36.4 °C (97.5 °F) 16 1.651 m (5' 5\") 74.844 kg (165 lb)    Body Mass Index Oxygen Saturation Smoking Status             27.46 kg/m2 90% Never Smoker          Basic Information     Date Of Birth Sex Race Ethnicity Preferred Language    1950 Female White Non- English      Your appointments     May 03, 2017 11:00 AM   Telemedicine Clinic Established Pt with Cynthia West M.D., PENNIE FLETCHER   [address] [address]   [phone]              Problem List              ICD-10-CM Priority Class Noted - Resolved    Pulmonary hypertension (CMS-HCC) I27.2   2016 - Present    Rheumatoid arthritis (CMS-HCC) M06.9   2016 - Present    Hypertension I10   2016 - Present    Neuropathy (CMS-HCC) G62.9   2016 - Present    Iron (Fe) deficiency anemia D50.9   2016 - Present    Vitamin B12 deficiency E53.8   2016 - Present    Hypothyroidism due to acquired atrophy of thyroid E03.4   2016 - Present    Pulmonary embolism (CMS-HCC) " I26.99   6/13/2016 - Present    Multiple thyroid nodules E04.2   6/13/2016 - Present    Lumbar discogenic pain syndrome M51.26   7/27/2016 - Present    Dysphagia R13.10   7/27/2016 - Present    SOB (shortness of breath) R06.02   4/5/2017 - Present    Acute bronchitis due to Haemophilus influenzae J20.1   4/5/2017 - Present      Health Maintenance        Date Due Completion Dates    IMM DTaP/Tdap/Td Vaccine (1 - Tdap) 11/17/1971 ---    PAP SMEAR 11/17/1973 ---    MAMMOGRAM 11/17/1992 ---    COLONOSCOPY 11/17/2002 ---    IMM ZOSTER VACCINE 11/17/2012 ---            Current Immunizations     No immunizations on file.      Below and/or attached are the medications your provider expects you to take. Review all of your home medications and newly ordered medications with your provider and/or pharmacist. Follow medication instructions as directed by your provider and/or pharmacist. Please keep your medication list with you and share with your provider. Update the information when medications are discontinued, doses are changed, or new medications (including over-the-counter products) are added; and carry medication information at all times in the event of emergency situations     Allergies:  LATEX - Anaphylaxis     TAPE - (reactions not documented)     WARFARIN SODIUM - Hives               Medications  Valid as of: April 12, 2017 - 10:22 AM    Generic Name Brand Name Tablet Size Instructions for use    Albuterol Sulfate (Aero Soln) albuterol 108 (90 BASE) MCG/ACT Inhale 2 Puffs by mouth every 6 hours as needed for Shortness of Breath.        Amoxicillin (Tab) AMOXIL 875 MG Take 1 Tab by mouth 2 times a day.        Amoxicillin (Tab) AMOXIL 875 MG Take 1 Tab by mouth 2 times a day.        Ascorbic Acid (Tab) Vitamin C 1000 MG Take 1 Tab by mouth every day.        Calcium (Tab) OS-LUIS ARMANDO 500 500 MG Take 1,000 mg by mouth 2 times a day, with meals.        Cholecalciferol (Cap) Vitamin D3 1000 UNITS Take 1 Cap by mouth every day.           Cyanocobalamin (Tab) vitamin B-12 1000 MCG Take 1,000 mcg by mouth every day.        Digoxin (Tab) LANOXIN 125 MCG Take 125 mcg by mouth every day.        DiltiaZEM HCl (Tab) CARDIZEM 30 MG Take 30 mg by mouth 4 times a day.        Ferrous Sulfate (Tab) Iron 325 (65 FE) MG Take 1 Tab by mouth every day.        Folic Acid (Tab) FOLVITE 1 MG Take 1 mg by mouth every day.        Folic Acid-Vit B6-Vit B12 (Tab) VIRT-GILLIAN 2.5-25-1 MG         Furosemide (Tab) LASIX 40 MG Take 40 mg by mouth 3 times a day.        Gabapentin (Tab) NEURONTIN 600 MG         Hydroxychloroquine Sulfate (Tab) PLAQUENIL 200 MG Take 200 mg by mouth 2 times a day.        Leflunomide (Tab) ARAVA 20 MG Take 20 mg by mouth every day.        Levothyroxine Sodium (Tab) SYNTHROID 137 MCG Take 1 Tab by mouth Every morning on an empty stomach.        Macitentan (Tab) Macitentan 10 MG Take 1 Tab by mouth every day.        NON SPECIFIED   Rx continuous O2 at 2L n/c   New O2 concentrator  Lowest O2 sat 82%        NON SPECIFIED   Rx continuous O2 at 2L n/c  New O2 concentrator  Lowest O2 sat 82%  SOB without use of O2        Pantoprazole Sodium (Tablet Delayed Response) PROTONIX 40 MG Take 40 mg by mouth 2 times a day.        Potassium Chloride (Tab CR) KLOR-CON 10 MEQ         Potassium Chloride Jennifer CR (Tab CR) Kdur 20 MEQ Take 20 mEq by mouth every day.        Rivaroxaban (Tab) XARELTO 20 MG Take 15 mg by mouth with dinner.        Rivaroxaban (Tab) XARELTO 15 MG         Tadalafil (PAH) (Tab) Tadalafil (PAH) 20 MG Take 2 Tabs by mouth every day.        Temazepam (Cap) RESTORIL 30 MG Take 1 Cap by mouth at bedtime as needed for Sleep.        TraMADol HCl (Tab) ULTRAM 50 MG Take 50 mg by mouth every four hours as needed.        Treprostinil   Inhale 1.74 mg by mouth. Inhale 12 puffs orally four times daily   Indications: 1.74mg/2.9ml        Triamcinolone Acetonide (Cream) KENALOG 0.1 %         Umeclidinium-Vilanterol (AEROSOL POWDER, BREATH ACTIVATED)  ANORO ELLIPTA 62.5-25 MCG/INH         .                 Medicines prescribed today were sent to:     [facility] - [address]    [address] [place] [address] [address] [facility] [address]    Phone: [phone] Fax: [phone]    Open 24 Hours?: No    [id number] - [place], [place] - [address]    [address] [place] [address]    Phone: [phone] Fax: [phone]    Open 24 Hours?: No      Medication refill instructions:       If your prescription bottle indicates you have medication refills left, it is not necessary to call your provider’s office. Please contact your pharmacy and they will refill your medication.    If your prescription bottle indicates you do not have any refills left, you may request refills at any time through one of the following ways: The online Innotrieve system (except Urgent Care), by calling your provider’s office, or by asking your pharmacy to contact your provider’s office with a refill request. Medication refills are processed only during regular business hours and may not be available until the next business day. Your provider may request additional information or to have a follow-up visit with you prior to refilling your medication.   *Please Note: Medication refills are assigned a new Rx number when refilled electronically. Your pharmacy may indicate that no refills were authorized even though a new prescription for the same medication is available at the pharmacy. Please request the medicine by name with the pharmacy before contacting your provider for a refill.           Innotrieve Access Code: Activation code not generated  Current Innotrieve Status: Active

## 2017-04-12 NOTE — MR AVS SNAPSHOT
Kaylynn Polo   2017 9:15 AM   Non-Provider Visit   MRN: [id number]    Department:  [facility]   Dept Phone:  [phone]    Description:  Female : 1950   Provider:  JUSTINE BUTTS           Reason for Visit     Other Venipuncture      Allergies as of 2017     Allergen Noted Reactions    Latex 2016   Anaphylaxis    Blisters on skin    Tape 2016       Blisters on skin    Warfarin Sodium 2016   Hives      You were diagnosed with     Iron deficiency anemia, unspecified iron deficiency anemia type   [7248231]         Vital Signs     Smoking Status                   Never Smoker            Basic Information     Date Of Birth Sex Race Ethnicity Preferred Language    1950 Female White Non- English      Your appointments     2017  9:30 AM   Telemedicine Clinic Established Pt with Cynthia West M.D., [facility] [address]  [facility] [id number] [phone]              Problem List              ICD-10-CM Priority Class Noted - Resolved    Pulmonary hypertension (CMS-HCC) I27.2   2016 - Present    Rheumatoid arthritis (CMS-HCC) M06.9   2016 - Present    Hypertension I10   2016 - Present    Neuropathy (CMS-HCC) G62.9   2016 - Present    Iron (Fe) deficiency anemia D50.9   2016 - Present    Vitamin B12 deficiency E53.8   2016 - Present    Hypothyroidism due to acquired atrophy of thyroid E03.4   2016 - Present    Pulmonary embolism (CMS-HCC) I26.99   2016 - Present    Multiple thyroid nodules E04.2   2016 - Present    Lumbar discogenic pain syndrome M51.26   2016 - Present    Dysphagia R13.10   2016 - Present    SOB (shortness of breath) R06.02   2017 - Present    Acute bronchitis due to Haemophilus influenzae J20.1   2017 - Present      Health Maintenance        Date Due Completion Dates    IMM DTaP/Tdap/Td Vaccine (1 - Tdap)  11/17/1971 ---    PAP SMEAR 11/17/1973 ---    MAMMOGRAM 11/17/1992 ---    COLONOSCOPY 11/17/2002 ---    IMM ZOSTER VACCINE 11/17/2012 ---            Current Immunizations     No immunizations on file.      Below and/or attached are the medications your provider expects you to take. Review all of your home medications and newly ordered medications with your provider and/or pharmacist. Follow medication instructions as directed by your provider and/or pharmacist. Please keep your medication list with you and share with your provider. Update the information when medications are discontinued, doses are changed, or new medications (including over-the-counter products) are added; and carry medication information at all times in the event of emergency situations     Allergies:  LATEX - Anaphylaxis     TAPE - (reactions not documented)     WARFARIN SODIUM - Hives               Medications  Valid as of: April 12, 2017 -  9:18 AM    Generic Name Brand Name Tablet Size Instructions for use    Albuterol Sulfate (Aero Soln) albuterol 108 (90 BASE) MCG/ACT Inhale 2 Puffs by mouth every 6 hours as needed for Shortness of Breath.        Amoxicillin (Tab) AMOXIL 875 MG Take 1 Tab by mouth 2 times a day.        Amoxicillin (Tab) AMOXIL 875 MG Take 1 Tab by mouth 2 times a day.        Ascorbic Acid (Tab) Vitamin C 1000 MG Take 1 Tab by mouth every day.        Calcium (Tab) OS-LUIS ARMANDO 500 500 MG Take 1,000 mg by mouth 2 times a day, with meals.        Cholecalciferol (Cap) Vitamin D3 1000 UNITS Take 1 Cap by mouth every day.        Cyanocobalamin (Tab) vitamin B-12 1000 MCG Take 1,000 mcg by mouth every day.        Digoxin (Tab) LANOXIN 125 MCG Take 125 mcg by mouth every day.        DiltiaZEM HCl (Tab) CARDIZEM 30 MG Take 30 mg by mouth 4 times a day.        Ferrous Sulfate (Tab) Iron 325 (65 FE) MG Take 1 Tab by mouth every day.        Folic Acid (Tab) FOLVITE 1 MG Take 1 mg by mouth every day.        Folic Acid-Vit B6-Vit B12 (Tab)  VIRT-GILLIAN 2.5-25-1 MG         Furosemide (Tab) LASIX 40 MG Take 40 mg by mouth 3 times a day.        Gabapentin (Tab) NEURONTIN 600 MG         Hydroxychloroquine Sulfate (Tab) PLAQUENIL 200 MG Take 200 mg by mouth 2 times a day.        Leflunomide (Tab) ARAVA 20 MG Take 20 mg by mouth every day.        Levothyroxine Sodium (Tab) SYNTHROID 137 MCG Take 1 Tab by mouth Every morning on an empty stomach.        Macitentan (Tab) Macitentan 10 MG Take 1 Tab by mouth every day.        NON SPECIFIED   Rx continuous O2 at 2L n/c   New O2 concentrator  Lowest O2 sat 82%        NON SPECIFIED   Rx continuous O2 at 2L n/c  New O2 concentrator  Lowest O2 sat 82%  SOB without use of O2        Pantoprazole Sodium (Tablet Delayed Response) PROTONIX 40 MG Take 40 mg by mouth 2 times a day.        Potassium Chloride (Tab CR) KLOR-CON 10 MEQ         Potassium Chloride Jennifer CR (Tab CR) Kdur 20 MEQ Take 20 mEq by mouth every day.        Rivaroxaban (Tab) XARELTO 20 MG Take 15 mg by mouth with dinner.        Rivaroxaban (Tab) XARELTO 15 MG         Tadalafil (PAH) (Tab) Tadalafil (PAH) 20 MG Take 2 Tabs by mouth every day.        Temazepam (Cap) RESTORIL 30 MG Take 1 Cap by mouth at bedtime as needed for Sleep.        TraMADol HCl (Tab) ULTRAM 50 MG Take 50 mg by mouth every four hours as needed.        Treprostinil   Inhale 1.74 mg by mouth. Inhale 12 puffs orally four times daily   Indications: 1.74mg/2.9ml        Triamcinolone Acetonide (Cream) KENALOG 0.1 %         Umeclidinium-Vilanterol (AEROSOL POWDER, BREATH ACTIVATED) ANORO ELLIPTA 62.5-25 MCG/INH         .                 Medicines prescribed today were sent to:     [facility] - [address] [facility] [address]    Phone: [phone] Fax: [phone]    Open 24 Hours?: No    [facility] - [place] - [address]    [address] [place] [address]    Phone: [phone] Fax: [phone]     Open 24 Hours?: No      Medication refill instructions:       If your prescription bottle indicates you have medication refills left, it is not necessary to call your provider’s office. Please contact your pharmacy and they will refill your medication.    If your prescription bottle indicates you do not have any refills left, you may request refills at any time through one of the following ways: The online SportStylist system (except Urgent Care), by calling your provider’s office, or by asking your pharmacy to contact your provider’s office with a refill request. Medication refills are processed only during regular business hours and may not be available until the next business day. Your provider may request additional information or to have a follow-up visit with you prior to refilling your medication.   *Please Note: Medication refills are assigned a new Rx number when refilled electronically. Your pharmacy may indicate that no refills were authorized even though a new prescription for the same medication is available at the pharmacy. Please request the medicine by name with the pharmacy before contacting your provider for a refill.           SportStylist Access Code: Activation code not generated  Current SportStylist Status: Active

## 2017-04-12 NOTE — TELEPHONE ENCOUNTER
Patient called asking if you were going to send a script for her nasal spray to [facility], they told her they haven't received the order yet

## 2017-04-12 NOTE — PROGRESS NOTES
Kaylynn Polo is a 66 y.o. female here for a non-provider visit for Venipuncture    If abnormal was an in office provider notified on receipt of results (if so, indicate provider)? Yes  Routed to PCP? Yes

## 2017-04-13 NOTE — PROGRESS NOTES
CC: Hospital follow-up visit    Verified identification with patient. Secured video conference with RN presenter in [facility]      HPI:   Kaylynn presents today with the following.    1. Pulmonary hypertension (CMS-[facility])  On March 5, patient was sent to the emergency room with complaints of increasing shortness of breath and hypoxia. Patient was transferred to [facility] in [place] where she had a 2 day hospital stay. CT of the thorax was negative for PE. Patient takes a lower dose of the Route toe. Patient is now requiring 3-1/2-4 L of oxygen per nasal cannula daily. Oxygen still drop to the mid to high 80s. While in the hospital, patient was seen by cardiology. Patient underwent diagnostic studies include echocardiogram, ultrasound of the lower extremities, chest x-ray, Persantine thallium. Hospital discharge summary unavailable at this time to review results. Patient continues to take amoxicillin that was prescribed to her for upper respiratory tract infection/bronchitis. Patient will have an appointment with [facility] in May.    2. Acute bronchitis due to Haemophilus influenzae  Patient is continuing her course of amoxicillin. Symptoms only slowly resolving. Still requiring higher amounts of oxygen.    3. Rheumatoid arthritis, involving unspecified site, unspecified rheumatoid factor presence (CMS-[facility])  Patient is followed by rheumatology and is treated with Plaquenil and Arava.     4. Iron deficiency anemia, unspecified iron deficiency anemia type  Patient is followed by hematologist. Patient is treated with iron. Had follow-up appointment 2 weeks ago. No changes in plan of care    5. Nasal congestion  Requesting Flonase refill      Patient Active Problem List    Diagnosis Date Noted   • SOB (shortness of breath) 04/05/2017   • Acute bronchitis due to Haemophilus influenzae 04/05/2017   • Lumbar discogenic pain syndrome 07/27/2016   • Dysphagia 07/27/2016   • Pulmonary hypertension (CMS-[facility])  06/13/2016   • Rheumatoid arthritis (CMS-HCC) 06/13/2016   • Hypertension 06/13/2016   • Neuropathy (CMS-HCC) 06/13/2016   • Iron (Fe) deficiency anemia 06/13/2016   • Vitamin B12 deficiency 06/13/2016   • Hypothyroidism due to acquired atrophy of thyroid 06/13/2016   • Pulmonary embolism (CMS-HCC) 06/13/2016   • Multiple thyroid nodules 06/13/2016       Current Outpatient Prescriptions   Medication Sig Dispense Refill   • fluticasone (FLONASE) 50 MCG/ACT nasal spray Spray 1 Spray in nose every day. 16 g 3   • amoxicillin (AMOXIL) 875 MG tablet Take 1 Tab by mouth 2 times a day. 14 Tab 0   • albuterol 108 (90 BASE) MCG/ACT Aero Soln inhalation aerosol Inhale 2 Puffs by mouth every 6 hours as needed for Shortness of Breath. 8.5 g 1   • amoxicillin (AMOXIL) 875 MG tablet Take 1 Tab by mouth 2 times a day. 14 Tab 0   • levothyroxine (SYNTHROID) 137 MCG Tab Take 1 Tab by mouth Every morning on an empty stomach. 90 Tab 0   • temazepam (RESTORIL) 30 MG capsule Take 1 Cap by mouth at bedtime as needed for Sleep. 90 Cap 0   • potassium chloride ER (KLOR-CON) 10 MEQ tablet      • XARELTO 15 MG Tab tablet      • triamcinolone acetonide (KENALOG) 0.1 % Cream      • VIRT-GILLIAN 2.5-25-1 MG Tab      • gabapentin (NEURONTIN) 600 MG tablet      • ANORO ELLIPTA 62.5-25 MCG/INH AEROSOL POWDER, BREATH ACTIVATED      • NON SPECIFIED Rx continuous O2 at 2L n/c  New O2 concentrator  Lowest O2 sat 82%  SOB without use of O2 1 Each 11   • NON SPECIFIED Rx continuous O2 at 2L n/c   New O2 concentrator  Lowest O2 sat 82% 1 Device 6   • Tadalafil, PAH, 20 MG Tab Take 2 Tabs by mouth every day.     • calcium carbonate (OS-LUIS ARMANDO 500) 500 MG Tab Take 1,000 mg by mouth 2 times a day, with meals.     • Cholecalciferol (VITAMIN D3) 1000 UNITS Cap Take 1 Cap by mouth every day.     • digoxin (LANOXIN) 125 MCG Tab Take 125 mcg by mouth every day.     • diltiazem (CARDIZEM) 30 MG Tab Take 30 mg by mouth 4 times a day.     • folic acid (FOLVITE) 1 MG Tab  "Take 1 mg by mouth every day.     • furosemide (LASIX) 40 MG Tab Take 40 mg by mouth 3 times a day.     • hydroxychloroquine (PLAQUENIL) 200 MG Tab Take 200 mg by mouth 2 times a day.     • Ferrous Sulfate (IRON) 325 (65 FE) MG Tab Take 1 Tab by mouth every day.     • leflunomide (ARAVA) 20 MG Tab Take 20 mg by mouth every day.     • Macitentan 10 MG Tab Take 1 Tab by mouth every day.     • pantoprazole (PROTONIX) 40 MG Tablet Delayed Response Take 40 mg by mouth 2 times a day.     • potassium chloride SA (K-DUR) 20 MEQ Tab CR Take 20 mEq by mouth every day.     • tramadol (ULTRAM) 50 MG Tab Take 50 mg by mouth every four hours as needed.     • Cyanocobalamin (VITAMIN B-12) 1000 MCG Tab Take 1,000 mcg by mouth every day.     • Ascorbic Acid (VITAMIN C) 1000 MG Tab Take 1 Tab by mouth every day.     • rivaroxaban (XARELTO) 20 MG Tab tablet Take 15 mg by mouth with dinner.     • Treprostinil (TYVASO INH) Inhale 1.74 mg by mouth. Inhale 12 puffs orally four times daily   Indications: 1.74mg/2.9ml       No current facility-administered medications for this visit.         Allergies as of 04/12/2017 - Pedro Luis as Reviewed 04/12/2017   Allergen Reaction Noted   • Latex Anaphylaxis 06/13/2016   • Tape  06/13/2016   • Warfarin sodium Hives 06/13/2016        ROS: As per HPI.    /73 mmHg  Pulse 67  Temp(Src) 36.4 °C (97.5 °F)  Resp 16  Ht 1.651 m (5' 5\")  Wt 74.844 kg (165 lb)  BMI 27.46 kg/m2  SpO2 90%    Physical Exam: All other cardiopulmonary, GI, neurologic symptoms negative.  Gen:         Alert and oriented, No apparent distress.  Neck:        No Lymphadenopathy or Bruits.  Lungs:     Decreased breath sounds throughout. Expiratory wheezes noted  CV:          Regular rate and rhythm. N+murmurs,   Abd:         Soft non tender, non distended. Normal active bowel sounds.  No  Hepatosplenomegaly, No pulsatile masses.                   Ext:          Trace  Edema lower extremities bilaterally      Assessment and Plan. "   66 y.o. female with the following issues.    1. Pulmonary hypertension (CMS-HCC)  Release of information from [facility] to review diagnostic studies   Keep follow-up appointment with [facility]    2. Acute bronchitis due to Haemophilus influenzae  Complete course of antibiotic  Continue oxygen to maintain O2 levels above 90%    3. Rheumatoid arthritis, involving unspecified site, unspecified rheumatoid factor presence (CMS-HCC)  Medications reviewed  Release of information for office visits from rheumatology    4. Iron deficiency anemia, unspecified iron deficiency anemia type  Release of information from hematology  Continue current plan of care    5. Nasal congestion    - fluticasone (FLONASE) 50 MCG/ACT nasal spray; Spray 1 Spray in nose every day.  Dispense: 16 g; Refill: 3            Please note that this dictation was created using voice recognition software. I have made every reasonable attempt to correct obvious errors, but expect that there are errors of grammar and possible content that I did not discover before finalizing note.

## 2017-04-17 ENCOUNTER — TELEPHONE (OUTPATIENT)
Dept: MEDICAL GROUP | Facility: PHYSICIAN GROUP | Age: 67
End: 2017-04-17

## 2017-04-21 RX ORDER — TEMAZEPAM 30 MG/1
30 CAPSULE ORAL NIGHTLY PRN
Qty: 90 CAP | Refills: 0 | Status: SHIPPED
Start: 2017-04-21 | End: 2017-09-21 | Stop reason: SDUPTHER

## 2017-05-03 ENCOUNTER — TELEMEDICINE ORIGINATING SITE VISIT (OUTPATIENT)
Dept: MEDICAL GROUP | Facility: CLINIC | Age: 67
End: 2017-05-03
Payer: COMMERCIAL

## 2017-05-03 ENCOUNTER — NON-PROVIDER VISIT (OUTPATIENT)
Dept: MEDICAL GROUP | Facility: CLINIC | Age: 67
End: 2017-05-03
Payer: MEDICARE

## 2017-05-03 ENCOUNTER — TELEMEDICINE2 (OUTPATIENT)
Dept: MEDICAL GROUP | Age: 67
End: 2017-05-03
Payer: COMMERCIAL

## 2017-05-03 VITALS
RESPIRATION RATE: 16 BRPM | HEIGHT: 65 IN | HEART RATE: 66 BPM | SYSTOLIC BLOOD PRESSURE: 110 MMHG | BODY MASS INDEX: 27.49 KG/M2 | WEIGHT: 165 LBS | TEMPERATURE: 98.9 F | DIASTOLIC BLOOD PRESSURE: 70 MMHG

## 2017-05-03 DIAGNOSIS — I27.20 PULMONARY HYPERTENSION (HCC): ICD-10-CM

## 2017-05-03 DIAGNOSIS — E03.9 HYPOTHYROIDISM, UNSPECIFIED TYPE: ICD-10-CM

## 2017-05-03 DIAGNOSIS — M05.79 RHEUMATOID ARTHRITIS INVOLVING MULTIPLE SITES WITH POSITIVE RHEUMATOID FACTOR (HCC): ICD-10-CM

## 2017-05-03 DIAGNOSIS — R73.9 HYPERGLYCEMIA: ICD-10-CM

## 2017-05-03 DIAGNOSIS — Z79.899 MEDICATION MANAGEMENT: ICD-10-CM

## 2017-05-03 LAB
HBA1C MFR BLD: 5.6 % (ref ?–5.8)
INT CON NEG: NEGATIVE
INT CON POS: POSITIVE

## 2017-05-03 PROCEDURE — 99214 OFFICE O/P EST MOD 30 MIN: CPT | Mod: GT | Performed by: INTERNAL MEDICINE

## 2017-05-03 RX ORDER — GABAPENTIN 800 MG/1
TABLET ORAL
COMMUNITY
Start: 2017-04-18

## 2017-05-03 RX ORDER — LEVOTHYROXINE SODIUM 137 UG/1
137 TABLET ORAL
Qty: 90 TAB | Refills: 2 | Status: SHIPPED | OUTPATIENT
Start: 2017-05-03 | End: 2017-12-28 | Stop reason: SDUPTHER

## 2017-05-03 RX ORDER — ALPRAZOLAM 0.5 MG/1
TABLET ORAL
COMMUNITY
Start: 2017-04-19 | End: 2018-04-17 | Stop reason: SDUPTHER

## 2017-05-03 ASSESSMENT — PATIENT HEALTH QUESTIONNAIRE - PHQ9: CLINICAL INTERPRETATION OF PHQ2 SCORE: 0

## 2017-05-03 NOTE — MR AVS SNAPSHOT
"        Kaylynn Polo   5/3/2017 11:00 AM   Telemedicine2   MRN: [id number]    Department:  [address] [place] [address]   Dept Phone:  [phone]    Description:  Female : 1950   Provider:  Cynthia West M.D.; TELEMED [place]           Reason for Visit     Follow-Up     Medication Refill           Allergies as of 5/3/2017     Allergen Noted Reactions    Latex 2016   Anaphylaxis    Blisters on skin    Tape 2016       Blisters on skin    Warfarin Sodium 2016   Hives      You were diagnosed with     Pulmonary hypertension (CMS-HCC)   [295700]       Rheumatoid arthritis involving multiple sites with positive rheumatoid factor (CMS-[facility])   [5020765]       Hyperglycemia   [910252]       Medication management   [056829]         Vital Signs     Blood Pressure Pulse Temperature Respirations Height Weight    110/70 mmHg 66 37.2 °C (98.9 °F) 16 1.651 m (5' 5\") 74.844 kg (165 lb)    Body Mass Index Smoking Status                27.46 kg/m2 Never Smoker           Basic Information     Date Of Birth Sex Race Ethnicity Preferred Language    1950 Female White Non- English      Your appointments     Aug 23, 2017  9:00 AM   Telemedicine Clinic Established Pt with Cynthia West M.D., PENNIE ELIZAB[address] [id number] [phone]              Problem List              ICD-10-CM Priority Class Noted - Resolved    Pulmonary hypertension (CMS-HCC) I27.2   2016 - Present    Rheumatoid arthritis involving multiple sites with positive rheumatoid factor (CMS-[facility]) M05.79   2016 - Present    Hypertension I10   2016 - Present    Neuropathy (CMS-HCC) G62.9   2016 - Present    Iron (Fe) deficiency anemia D50.9   2016 - Present    Vitamin B12 deficiency E53.8   2016 - Present    Hypothyroidism due to acquired atrophy of thyroid E03.4   2016 - Present    Pulmonary embolism (CMS-[facility]) I26.99   2016 - " Present    Multiple thyroid nodules E04.2   6/13/2016 - Present    Lumbar discogenic pain syndrome M51.26   7/27/2016 - Present    Dysphagia R13.10   7/27/2016 - Present    SOB (shortness of breath) R06.02   4/5/2017 - Present    Acute bronchitis due to Haemophilus influenzae J20.1   4/5/2017 - Present    Hyperglycemia R73.9   5/3/2017 - Present      Health Maintenance        Date Due Completion Dates    IMM DTaP/Tdap/Td Vaccine (1 - Tdap) 11/17/1971 ---    PAP SMEAR 11/17/1973 ---    MAMMOGRAM 11/17/1992 ---    COLONOSCOPY 11/17/2002 ---    IMM ZOSTER VACCINE 11/17/2012 ---            Current Immunizations     No immunizations on file.      Below and/or attached are the medications your provider expects you to take. Review all of your home medications and newly ordered medications with your provider and/or pharmacist. Follow medication instructions as directed by your provider and/or pharmacist. Please keep your medication list with you and share with your provider. Update the information when medications are discontinued, doses are changed, or new medications (including over-the-counter products) are added; and carry medication information at all times in the event of emergency situations     Allergies:  LATEX - Anaphylaxis     TAPE - (reactions not documented)     WARFARIN SODIUM - Hives               Medications  Valid as of: May 03, 2017 - 11:35 AM    Generic Name Brand Name Tablet Size Instructions for use    Albuterol Sulfate (Aero Soln) albuterol 108 (90 BASE) MCG/ACT Inhale 2 Puffs by mouth every 6 hours as needed for Shortness of Breath.        ALPRAZolam (Tab) XANAX 0.5 MG         Amoxicillin (Tab) AMOXIL 875 MG Take 1 Tab by mouth 2 times a day.        Amoxicillin (Tab) AMOXIL 875 MG Take 1 Tab by mouth 2 times a day.        Ascorbic Acid (Tab) Vitamin C 1000 MG Take 1 Tab by mouth every day.        Calcium (Tab) OS-LUIS ARMANDO 500 500 MG Take 1,000 mg by mouth 2 times a day, with meals.        Cholecalciferol  (Cap) Vitamin D3 1000 UNITS Take 1 Cap by mouth every day.        Cyanocobalamin (Tab) vitamin B-12 1000 MCG Take 1,000 mcg by mouth every day.        Digoxin (Tab) LANOXIN 125 MCG Take 125 mcg by mouth every day.        DilTIAZem HCl (Tab) CARDIZEM 30 MG Take 30 mg by mouth 4 times a day.        Ferrous Sulfate (Tab) Iron 325 (65 FE) MG Take 1 Tab by mouth every day.        Fluticasone Propionate (Suspension) FLONASE 50 MCG/ACT Spray 1 Spray in nose every day.        Folic Acid (Tab) FOLVITE 1 MG Take 1 mg by mouth every day.        Folic Acid-Vit B6-Vit B12 (Tab) VIRT-GILLIAN 2.5-25-1 MG         Furosemide (Tab) LASIX 40 MG Take 40 mg by mouth 3 times a day.        Gabapentin (Tab) NEURONTIN 600 MG         Gabapentin (Tab) NEURONTIN 800 MG         Hydroxychloroquine Sulfate (Tab) PLAQUENIL 200 MG Take 200 mg by mouth 2 times a day.        Leflunomide (Tab) ARAVA 20 MG Take 20 mg by mouth every day.        Levothyroxine Sodium (Tab) SYNTHROID 137 MCG Take 1 Tab by mouth Every morning on an empty stomach.        Macitentan (Tab) Macitentan 10 MG Take 1 Tab by mouth every day.        NON SPECIFIED   Rx continuous O2 at 2L n/c   New O2 concentrator  Lowest O2 sat 82%        NON SPECIFIED   Rx continuous O2 at 2L n/c  New O2 concentrator  Lowest O2 sat 82%  SOB without use of O2        Pantoprazole Sodium (Tablet Delayed Response) PROTONIX 40 MG Take 40 mg by mouth 2 times a day.        Potassium Chloride (Tab CR) KLOR-CON 10 MEQ         Potassium Chloride Jennifer CR (Tab CR) Kdur 20 MEQ Take 20 mEq by mouth every day.        Rivaroxaban (Tab) XARELTO 20 MG Take 15 mg by mouth with dinner.        Rivaroxaban (Tab) XARELTO 15 MG         Tadalafil (PAH) (Tab) Tadalafil (PAH) 20 MG Take 2 Tabs by mouth every day.        Temazepam (Cap) RESTORIL 30 MG Take 1 Cap by mouth at bedtime as needed for Sleep.        TraMADol HCl (Tab) ULTRAM 50 MG Take 50 mg by mouth every four hours as needed.        Treprostinil   Inhale 1.74 mg by  mouth. Inhale 12 puffs orally four times daily   Indications: 1.74mg/2.9ml        Triamcinolone Acetonide (Cream) KENALOG 0.1 %         Umeclidinium-Vilanterol (AEROSOL POWDER, BREATH ACTIVATED) ANORO ELLIPTA 62.5-25 MCG/INH         .                 Medicines prescribed today were sent to:     [facility] - [address]    [address] [place] [address] [address] [facility] [address]    Phone: [phone] Fax: [phone]    Open 24 Hours?: No    [id number] - [place], [place] - [address]    [address] [place] [address]    Phone: [phone] Fax: [phone]    Open 24 Hours?: No      Medication refill instructions:       If your prescription bottle indicates you have medication refills left, it is not necessary to call your provider’s office. Please contact your pharmacy and they will refill your medication.    If your prescription bottle indicates you do not have any refills left, you may request refills at any time through one of the following ways: The online [employer] system (except Urgent Care), by calling your provider’s office, or by asking your pharmacy to contact your provider’s office with a refill request. Medication refills are processed only during regular business hours and may not be available until the next business day. Your provider may request additional information or to have a follow-up visit with you prior to refilling your medication.   *Please Note: Medication refills are assigned a new Rx number when refilled electronically. Your pharmacy may indicate that no refills were authorized even though a new prescription for the same medication is available at the pharmacy. Please request the medicine by name with the pharmacy before contacting your provider for a refill.           [employer] Access Code: Activation code not generated  Current [employer] Status: Active

## 2017-05-03 NOTE — MR AVS SNAPSHOT
Kaylynn Polo   5/3/2017 11:30 AM   Non-Provider Visit   MRN: [id number]    Department:  [facility]   Dept Phone:  [phone]    Description:  Female : 1950   Provider:  JUSTINE BUTTS           Reason for Visit     Other venipuncture      Allergies as of 5/3/2017     Allergen Noted Reactions    Latex 2016   Anaphylaxis    Blisters on skin    Tape 2016       Blisters on skin    Warfarin Sodium 2016   Hives      You were diagnosed with     Hyperglycemia   [920997]         Vital Signs     Smoking Status                   Never Smoker            Basic Information     Date Of Birth Sex Race Ethnicity Preferred Language    1950 Female White Non- English      Problem List              ICD-10-CM Priority Class Noted - Resolved    Pulmonary hypertension (CMS-HCC) I27.2   2016 - Present    Rheumatoid arthritis involving multiple sites with positive rheumatoid factor (CMS-HCC) M05.79   2016 - Present    Hypertension I10   2016 - Present    Neuropathy (CMS-HCC) G62.9   2016 - Present    Iron (Fe) deficiency anemia D50.9   2016 - Present    Vitamin B12 deficiency E53.8   2016 - Present    Hypothyroidism due to acquired atrophy of thyroid E03.4   2016 - Present    Pulmonary embolism (CMS-HCC) I26.99   2016 - Present    Multiple thyroid nodules E04.2   2016 - Present    Lumbar discogenic pain syndrome M51.26   2016 - Present    Dysphagia R13.10   2016 - Present    SOB (shortness of breath) R06.02   2017 - Present    Acute bronchitis due to Haemophilus influenzae J20.1   2017 - Present    Hyperglycemia R73.9   5/3/2017 - Present      Health Maintenance        Date Due Completion Dates    IMM DTaP/Tdap/Td Vaccine (1 - Tdap) 1971 ---    PAP SMEAR 1973 ---    MAMMOGRAM 1992 ---    COLONOSCOPY 2002 ---    IMM ZOSTER VACCINE 2012 ---            Results     POCT A1C      Component    Glycohemoglobin    5.6    Comment:     QC passed. Lot:[id number]. Exp: 10/31/18    Internal Control Negative    Negative    Internal Control Positive    Positive                        Current Immunizations     No immunizations on file.      Below and/or attached are the medications your provider expects you to take. Review all of your home medications and newly ordered medications with your provider and/or pharmacist. Follow medication instructions as directed by your provider and/or pharmacist. Please keep your medication list with you and share with your provider. Update the information when medications are discontinued, doses are changed, or new medications (including over-the-counter products) are added; and carry medication information at all times in the event of emergency situations     Allergies:  LATEX - Anaphylaxis     TAPE - (reactions not documented)     WARFARIN SODIUM - Hives               Medications  Valid as of: May 03, 2017 - 11:33 AM    Generic Name Brand Name Tablet Size Instructions for use    Albuterol Sulfate (Aero Soln) albuterol 108 (90 BASE) MCG/ACT Inhale 2 Puffs by mouth every 6 hours as needed for Shortness of Breath.        ALPRAZolam (Tab) XANAX 0.5 MG         Amoxicillin (Tab) AMOXIL 875 MG Take 1 Tab by mouth 2 times a day.        Amoxicillin (Tab) AMOXIL 875 MG Take 1 Tab by mouth 2 times a day.        Ascorbic Acid (Tab) Vitamin C 1000 MG Take 1 Tab by mouth every day.        Calcium (Tab) OS-LUIS ARMANDO 500 500 MG Take 1,000 mg by mouth 2 times a day, with meals.        Cholecalciferol (Cap) Vitamin D3 1000 UNITS Take 1 Cap by mouth every day.        Cyanocobalamin (Tab) vitamin B-12 1000 MCG Take 1,000 mcg by mouth every day.        Digoxin (Tab) LANOXIN 125 MCG Take 125 mcg by mouth every day.        DilTIAZem HCl (Tab) CARDIZEM 30 MG Take 30 mg by mouth 4 times a day.        Ferrous Sulfate (Tab) Iron 325 (65 FE) MG Take 1 Tab by mouth every day.        Fluticasone Propionate  (Suspension) FLONASE 50 MCG/ACT Spray 1 Spray in nose every day.        Folic Acid (Tab) FOLVITE 1 MG Take 1 mg by mouth every day.        Folic Acid-Vit B6-Vit B12 (Tab) VIRT-GILLIAN 2.5-25-1 MG         Furosemide (Tab) LASIX 40 MG Take 40 mg by mouth 3 times a day.        Gabapentin (Tab) NEURONTIN 600 MG         Gabapentin (Tab) NEURONTIN 800 MG         Hydroxychloroquine Sulfate (Tab) PLAQUENIL 200 MG Take 200 mg by mouth 2 times a day.        Leflunomide (Tab) ARAVA 20 MG Take 20 mg by mouth every day.        Levothyroxine Sodium (Tab) SYNTHROID 137 MCG Take 1 Tab by mouth Every morning on an empty stomach.        Macitentan (Tab) Macitentan 10 MG Take 1 Tab by mouth every day.        NON SPECIFIED   Rx continuous O2 at 2L n/c   New O2 concentrator  Lowest O2 sat 82%        NON SPECIFIED   Rx continuous O2 at 2L n/c  New O2 concentrator  Lowest O2 sat 82%  SOB without use of O2        Pantoprazole Sodium (Tablet Delayed Response) PROTONIX 40 MG Take 40 mg by mouth 2 times a day.        Potassium Chloride (Tab CR) KLOR-CON 10 MEQ         Potassium Chloride Jennifer CR (Tab CR) Kdur 20 MEQ Take 20 mEq by mouth every day.        Rivaroxaban (Tab) XARELTO 20 MG Take 15 mg by mouth with dinner.        Rivaroxaban (Tab) XARELTO 15 MG         Tadalafil (PAH) (Tab) Tadalafil (PAH) 20 MG Take 2 Tabs by mouth every day.        Temazepam (Cap) RESTORIL 30 MG Take 1 Cap by mouth at bedtime as needed for Sleep.        TraMADol HCl (Tab) ULTRAM 50 MG Take 50 mg by mouth every four hours as needed.        Treprostinil   Inhale 1.74 mg by mouth. Inhale 12 puffs orally four times daily   Indications: 1.74mg/2.9ml        Triamcinolone Acetonide (Cream) KENALOG 0.1 %         Umeclidinium-Vilanterol (AEROSOL POWDER, BREATH ACTIVATED) ANORO ELLIPTA 62.5-25 MCG/INH         .                 Medicines prescribed today were sent to:     [facility] - [place] - [address] [place] [address] [address] [place]  [place] [address]    Phone: [phone] Fax: [phone]    Open 24 Hours?: No    [facility] - [place], [place] - [address]    [address] [place] [address]    Phone: [phone] Fax: [phone]    Open 24 Hours?: No      Medication refill instructions:       If your prescription bottle indicates you have medication refills left, it is not necessary to call your provider’s office. Please contact your pharmacy and they will refill your medication.    If your prescription bottle indicates you do not have any refills left, you may request refills at any time through one of the following ways: The online [employer] system (except Urgent Care), by calling your provider’s office, or by asking your pharmacy to contact your provider’s office with a refill request. Medication refills are processed only during regular business hours and may not be available until the next business day. Your provider may request additional information or to have a follow-up visit with you prior to refilling your medication.   *Please Note: Medication refills are assigned a new Rx number when refilled electronically. Your pharmacy may indicate that no refills were authorized even though a new prescription for the same medication is available at the pharmacy. Please request the medicine by name with the pharmacy before contacting your provider for a refill.           [employer] Access Code: Activation code not generated  Current [employer] Status: Active

## 2017-05-03 NOTE — PROGRESS NOTES
Kaylynn Polo is a 66 y.o. female here for a non-provider visit for A1C    If abnormal was an in office provider notified today (if so, indicate provider)? Yes  Routed to PCP? Yes

## 2017-05-03 NOTE — PROGRESS NOTES
CC: One month follow-up visit    Verified identification with patient. Secured video conference with RN presenter in [facility]      HPI:   Kaylynn presents today with the following.    1. Pulmonary hypertension (CMS-HCC)  Patient was seen by her pulmonologist at [facility] 2 days ago. Patient continues on her current treatment plan. No medication changes. Completed echocardiogram. 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 Continue with sildenafil and Xarelto  History of bronchitis last month. Completed course of antibiotic and is now almost back to baseline. Using 3 L nasal cannula of oxygen. O2 sats ranged in the 87% range although they have been 95%. Most recent BNP is 23    2. Rheumatoid arthritis involving multiple sites with positive rheumatoid factor (CMS-HCC)  Patient was seen by her rheumatologist via telemedicine, Dr. Joon Joshi. Patient continues with Plaquenil and Arava. Her gabapentin was increased to 800 mg by mouth 3 times a day to help with neuropathy.    3. Hyperglycemia  Patient noted to have blood sugars, 120, 131. No history of diabetes    4. Medication management  Patient would like to review her medications and have some refilled    Patient Active Problem List    Diagnosis Date Noted   • Hyperglycemia 05/03/2017   • SOB (shortness of breath) 04/05/2017   • Acute bronchitis due to Haemophilus influenzae 04/05/2017   • Lumbar discogenic pain syndrome 07/27/2016   • Dysphagia 07/27/2016   • Pulmonary hypertension (CMS-HCC) 06/13/2016   • Rheumatoid arthritis involving multiple sites with positive rheumatoid factor (CMS-HCC) 06/13/2016   • Hypertension 06/13/2016   • Neuropathy (CMS-[facility]) 06/13/2016   • Iron (Fe) deficiency anemia 06/13/2016   • Vitamin B12 deficiency 06/13/2016   • Hypothyroidism due to acquired atrophy of thyroid 06/13/2016   • Pulmonary embolism (CMS-HCC) 06/13/2016   • Multiple thyroid nodules 06/13/2016       Current Outpatient  Prescriptions   Medication Sig Dispense Refill   • alprazolam (XANAX) 0.5 MG Tab      • gabapentin (NEURONTIN) 800 MG tablet      • temazepam (RESTORIL) 30 MG capsule Take 1 Cap by mouth at bedtime as needed for Sleep. 90 Cap 0   • fluticasone (FLONASE) 50 MCG/ACT nasal spray Spray 1 Spray in nose every day. 16 g 3   • amoxicillin (AMOXIL) 875 MG tablet Take 1 Tab by mouth 2 times a day. 14 Tab 0   • albuterol 108 (90 BASE) MCG/ACT Aero Soln inhalation aerosol Inhale 2 Puffs by mouth every 6 hours as needed for Shortness of Breath. 8.5 g 1   • amoxicillin (AMOXIL) 875 MG tablet Take 1 Tab by mouth 2 times a day. 14 Tab 0   • levothyroxine (SYNTHROID) 137 MCG Tab Take 1 Tab by mouth Every morning on an empty stomach. 90 Tab 0   • potassium chloride ER (KLOR-CON) 10 MEQ tablet      • XARELTO 15 MG Tab tablet      • triamcinolone acetonide (KENALOG) 0.1 % Cream      • VIRT-GILLIAN 2.5-25-1 MG Tab      • gabapentin (NEURONTIN) 600 MG tablet      • ANORO ELLIPTA 62.5-25 MCG/INH AEROSOL POWDER, BREATH ACTIVATED      • NON SPECIFIED Rx continuous O2 at 2L n/c  New O2 concentrator  Lowest O2 sat 82%  SOB without use of O2 1 Each 11   • NON SPECIFIED Rx continuous O2 at 2L n/c   New O2 concentrator  Lowest O2 sat 82% 1 Device 6   • Tadalafil, PAH, 20 MG Tab Take 2 Tabs by mouth every day.     • calcium carbonate (OS-LUIS ARMANDO 500) 500 MG Tab Take 1,000 mg by mouth 2 times a day, with meals.     • Cholecalciferol (VITAMIN D3) 1000 UNITS Cap Take 1 Cap by mouth every day.     • digoxin (LANOXIN) 125 MCG Tab Take 125 mcg by mouth every day.     • diltiazem (CARDIZEM) 30 MG Tab Take 30 mg by mouth 4 times a day.     • folic acid (FOLVITE) 1 MG Tab Take 1 mg by mouth every day.     • furosemide (LASIX) 40 MG Tab Take 40 mg by mouth 3 times a day.     • hydroxychloroquine (PLAQUENIL) 200 MG Tab Take 200 mg by mouth 2 times a day.     • Ferrous Sulfate (IRON) 325 (65 FE) MG Tab Take 1 Tab by mouth every day.     • leflunomide (ARAVA) 20 MG  "Tab Take 20 mg by mouth every day.     • Macitentan 10 MG Tab Take 1 Tab by mouth every day.     • pantoprazole (PROTONIX) 40 MG Tablet Delayed Response Take 40 mg by mouth 2 times a day.     • potassium chloride SA (K-DUR) 20 MEQ Tab CR Take 20 mEq by mouth every day.     • tramadol (ULTRAM) 50 MG Tab Take 50 mg by mouth every four hours as needed.     • Cyanocobalamin (VITAMIN B-12) 1000 MCG Tab Take 1,000 mcg by mouth every day.     • Ascorbic Acid (VITAMIN C) 1000 MG Tab Take 1 Tab by mouth every day.     • rivaroxaban (XARELTO) 20 MG Tab tablet Take 15 mg by mouth with dinner.     • Treprostinil (TYVASO INH) Inhale 1.74 mg by mouth. Inhale 12 puffs orally four times daily   Indications: 1.74mg/2.9ml       No current facility-administered medications for this visit.         Allergies as of 05/03/2017 - Pedro Luis as Reviewed 05/03/2017   Allergen Reaction Noted   • Latex Anaphylaxis 06/13/2016   • Tape  06/13/2016   • Warfarin sodium Hives 06/13/2016        ROS: As per HPI. All other cardiopulmonary, GI, neurologic, rheumatologic symptoms negative    /70 mmHg  Pulse 66  Temp(Src) 37.2 °C (98.9 °F)  Resp 16  Ht 1.651 m (5' 5\")  Wt 74.844 kg (165 lb)  BMI 27.46 kg/m2    Physical Exam:  Gen:         Alert and oriented, No apparent distress.  Neck:        No Lymphadenopathy or Bruits.  Lungs:     Clear to auscultation bilaterally, no wheezes rhonchi or crackles  CV:          Regular rate and rhythm. No murmurs, rubs or gallops.  Abd:         Soft non tender, non distended. Normal active bowel sounds.  No  Hepatosplenomegaly, No pulsatile masses.                   Ext:          No clubbing, cyanosis, edema.      Assessment and Plan.   66 y.o. female with the following issues.    1. Pulmonary hypertension (CMS-HCC)  Office visits from [facility] reviewed.  Continued plan of care  Follow up in 3 months    2. Rheumatoid arthritis involving multiple sites with positive rheumatoid factor (CMS-HCC)  Request office visits " from rheumatology.  Continue plan of care  Follow up in 3 months    3. Hyperglycemia  Hemoglobin A1c 5.6    4. Medication management  Medications reviewed. Patient will need refill on levothyroxine and [facility]            Please note that this dictation was created using voice recognition software. I have made every reasonable attempt to correct obvious errors, but expect that there are errors of grammar and possible content that I did not discover before finalizing note.

## 2017-05-08 DIAGNOSIS — R09.81 NASAL CONGESTION: ICD-10-CM

## 2017-05-08 RX ORDER — FLUTICASONE PROPIONATE 50 MCG
1 SPRAY, SUSPENSION (ML) NASAL DAILY
Qty: 16 G | Refills: 6 | Status: SHIPPED | OUTPATIENT
Start: 2017-05-08 | End: 2017-11-29 | Stop reason: SDUPTHER

## 2017-05-11 ENCOUNTER — TELEPHONE (OUTPATIENT)
Dept: MEDICAL GROUP | Facility: PHYSICIAN GROUP | Age: 67
End: 2017-05-11

## 2017-08-14 ENCOUNTER — TELEPHONE (OUTPATIENT)
Dept: MEDICAL GROUP | Facility: CLINIC | Age: 67
End: 2017-08-14

## 2017-08-14 RX ORDER — PNV NO.95/FERROUS FUM/FOLIC AC 28MG-0.8MG
1 TABLET ORAL DAILY
Qty: 30 TAB | Status: CANCELLED | OUTPATIENT
Start: 2017-08-14

## 2017-08-14 NOTE — TELEPHONE ENCOUNTER
Was the patient seen in the last year in this department? Yes     Does patient have an active prescription for medications requested? Yes     Received Request Via: Patient     Requested Prescriptions     Pending Prescriptions Disp Refills   • Ferrous Sulfate (IRON) 325 (65 FE) MG Tab 30 Tab      Sig: Take 1 Tab by mouth every day.

## 2017-08-14 NOTE — TELEPHONE ENCOUNTER
Patient is requesting a referral to Dr. Raj Rasmussen, Neurosurgeon at [facility] in [place].    Thank you!

## 2017-08-15 DIAGNOSIS — D50.9 IRON DEFICIENCY ANEMIA, UNSPECIFIED IRON DEFICIENCY ANEMIA TYPE: ICD-10-CM

## 2017-08-15 RX ORDER — PNV NO.95/FERROUS FUM/FOLIC AC 28MG-0.8MG
1 TABLET ORAL DAILY
Qty: 30 TAB | Refills: 5 | Status: SHIPPED | OUTPATIENT
Start: 2017-08-15 | End: 2017-10-24 | Stop reason: SDUPTHER

## 2017-08-16 DIAGNOSIS — M51.26 LUMBAR DISCOGENIC PAIN SYNDROME: ICD-10-CM

## 2017-08-23 ENCOUNTER — TELEMEDICINE2 (OUTPATIENT)
Dept: MEDICAL GROUP | Age: 67
End: 2017-08-23
Payer: COMMERCIAL

## 2017-08-23 ENCOUNTER — TELEMEDICINE ORIGINATING SITE VISIT (OUTPATIENT)
Dept: MEDICAL GROUP | Facility: CLINIC | Age: 67
End: 2017-08-23
Payer: COMMERCIAL

## 2017-08-23 ENCOUNTER — NON-PROVIDER VISIT (OUTPATIENT)
Dept: MEDICAL GROUP | Facility: CLINIC | Age: 67
End: 2017-08-23
Payer: COMMERCIAL

## 2017-08-23 VITALS
BODY MASS INDEX: 27.66 KG/M2 | SYSTOLIC BLOOD PRESSURE: 108 MMHG | HEART RATE: 65 BPM | TEMPERATURE: 97.6 F | HEIGHT: 65 IN | WEIGHT: 166 LBS | RESPIRATION RATE: 16 BRPM | DIASTOLIC BLOOD PRESSURE: 67 MMHG | OXYGEN SATURATION: 91 %

## 2017-08-23 DIAGNOSIS — E03.4 HYPOTHYROIDISM DUE TO ACQUIRED ATROPHY OF THYROID: ICD-10-CM

## 2017-08-23 DIAGNOSIS — R10.84 ABDOMINAL DISCOMFORT, GENERALIZED: ICD-10-CM

## 2017-08-23 DIAGNOSIS — I27.20 PULMONARY HYPERTENSION (HCC): ICD-10-CM

## 2017-08-23 DIAGNOSIS — M51.26 LUMBAR DISCOGENIC PAIN SYNDROME: ICD-10-CM

## 2017-08-23 DIAGNOSIS — M05.79 RHEUMATOID ARTHRITIS INVOLVING MULTIPLE SITES WITH POSITIVE RHEUMATOID FACTOR (HCC): ICD-10-CM

## 2017-08-23 DIAGNOSIS — I10 ESSENTIAL HYPERTENSION: ICD-10-CM

## 2017-08-23 PROCEDURE — 99214 OFFICE O/P EST MOD 30 MIN: CPT | Mod: GT | Performed by: INTERNAL MEDICINE

## 2017-08-23 PROCEDURE — 36415 COLL VENOUS BLD VENIPUNCTURE: CPT | Performed by: FAMILY MEDICINE

## 2017-08-23 RX ORDER — SULFASALAZINE 500 MG/1
500 TABLET ORAL
COMMUNITY

## 2017-08-23 NOTE — MR AVS SNAPSHOT
"        Kaylynn Polo   2017 9:00 AM   Telemedicine2   MRN: [id number]    Department:  [address] [place] [address]   Dept Phone:  [phone]    Description:  Female : 1950   Provider:  Cynthia West M.D.; [facility] [place]           Reason for Visit     Follow-Up           Allergies as of 2017     Allergen Noted Reactions    Latex 2016   Anaphylaxis    Blisters on skin    Tape 2016       Blisters on skin    Warfarin Sodium 2016   Hives      You were diagnosed with     Pulmonary hypertension (CMS-HCC)   [680819]       Rheumatoid arthritis involving multiple sites with positive rheumatoid factor (CMS-HCC)   [4980862]       Lumbar discogenic pain syndrome   [212289]       Abdominal discomfort, generalized   [068102]         Vital Signs     Blood Pressure Pulse Temperature Respirations Height Weight    108/67 mmHg 65 36.4 °C (97.6 °F) 16 1.651 m (5' 5\") 75.297 kg (166 lb)    Body Mass Index Oxygen Saturation Smoking Status             27.62 kg/m2 91% Never Smoker          Basic Information     Date Of Birth Sex Race Ethnicity Preferred Language    1950 Female White Non- English      Problem List              ICD-10-CM Priority Class Noted - Resolved    Pulmonary hypertension (CMS-HCC) I27.2   2016 - Present    Rheumatoid arthritis involving multiple sites with positive rheumatoid factor (CMS-HCC) M05.79   2016 - Present    Hypertension I10   2016 - Present    Neuropathy (CMS-HCC) G62.9   2016 - Present    Iron (Fe) deficiency anemia D50.9   2016 - Present    Vitamin B12 deficiency E53.8   2016 - Present    Hypothyroidism due to acquired atrophy of thyroid E03.4   2016 - Present    Pulmonary embolism (CMS-HCC) I26.99   2016 - Present    Multiple thyroid nodules E04.2   2016 - Present    Lumbar discogenic pain syndrome M51.26   2016 - Present    Dysphagia R13.10   2016 - Present    SOB (shortness of breath) R06.02   2017 - " Present    Acute bronchitis due to Haemophilus influenzae J20.1   4/5/2017 - Present    Hyperglycemia R73.9   5/3/2017 - Present    Abdominal discomfort, generalized R10.84   8/23/2017 - Present      Health Maintenance        Date Due Completion Dates    IMM DTaP/Tdap/Td Vaccine (1 - Tdap) 11/17/1971 ---    PAP SMEAR 11/17/1973 ---    MAMMOGRAM 11/17/1992 ---    COLONOSCOPY 11/17/2002 ---    IMM ZOSTER VACCINE 11/17/2012 ---    IMM INFLUENZA (1) 9/1/2017 ---            Current Immunizations     No immunizations on file.      Below and/or attached are the medications your provider expects you to take. Review all of your home medications and newly ordered medications with your provider and/or pharmacist. Follow medication instructions as directed by your provider and/or pharmacist. Please keep your medication list with you and share with your provider. Update the information when medications are discontinued, doses are changed, or new medications (including over-the-counter products) are added; and carry medication information at all times in the event of emergency situations     Allergies:  LATEX - Anaphylaxis     TAPE - (reactions not documented)     WARFARIN SODIUM - Hives               Medications  Valid as of: August 23, 2017 -  9:45 AM    Generic Name Brand Name Tablet Size Instructions for use    Albuterol Sulfate (Aero Soln) albuterol 108 (90 Base) MCG/ACT Inhale 2 Puffs by mouth every 6 hours as needed for Shortness of Breath.        ALPRAZolam (Tab) XANAX 0.5 MG         Amoxicillin (Tab) AMOXIL 875 MG Take 1 Tab by mouth 2 times a day.        Amoxicillin (Tab) AMOXIL 875 MG Take 1 Tab by mouth 2 times a day.        Ascorbic Acid (Tab) Vitamin C 1000 MG Take 1 Tab by mouth every day.        Calcium (Tab) OS-LUIS ARMANDO 500 500 MG Take 1,000 mg by mouth 2 times a day, with meals.        Cholecalciferol (Cap) Vitamin D3 1000 units Take 1 Cap by mouth every day.        Cyanocobalamin (Tab) vitamin B-12 1000 MCG Take  1,000 mcg by mouth every day.        Digoxin (Tab) LANOXIN 125 MCG Take 125 mcg by mouth every day.        DilTIAZem HCl (Tab) CARDIZEM 30 MG Take 30 mg by mouth 4 times a day.        Ferrous Sulfate (Tab) Iron 325 (65 Fe) MG Take 1 Tab by mouth every day.        Fluticasone Propionate (Suspension) FLONASE 50 MCG/ACT Spray 1 Spray in nose every day.        Folic Acid (Tab) FOLVITE 1 MG Take 1 mg by mouth every day.        Folic Acid-Vit B6-Vit B12 (Tab) VIRT-GILLIAN 2.5-25-1 MG Take 2.5 mg by mouth every day.        Furosemide (Tab) LASIX 40 MG Take 40 mg by mouth 3 times a day.        Gabapentin (Tab) NEURONTIN 600 MG         Gabapentin (Tab) NEURONTIN 800 MG         Hydroxychloroquine Sulfate (Tab) PLAQUENIL 200 MG Take 200 mg by mouth 2 times a day.        Leflunomide (Tab) ARAVA 20 MG Take 20 mg by mouth every day.        Levothyroxine Sodium (Tab) SYNTHROID 137 MCG Take 1 Tab by mouth Every morning on an empty stomach.        Macitentan (Tab) Macitentan 10 MG Take 1 Tab by mouth every day.        NON SPECIFIED   Rx continuous O2 at 2L n/c   New O2 concentrator  Lowest O2 sat 82%        NON SPECIFIED   Rx continuous O2 at 2L n/c  New O2 concentrator  Lowest O2 sat 82%  SOB without use of O2        Pantoprazole Sodium (Tablet Delayed Response) PROTONIX 40 MG Take 40 mg by mouth 2 times a day.        Potassium Chloride (Tab CR) KLOR-CON 10 MEQ         Potassium Chloride Jennifer CR (Tab CR) Kdur 20 MEQ Take 20 mEq by mouth every day.        Rivaroxaban (Tab) XARELTO 20 MG Take 15 mg by mouth with dinner.        Rivaroxaban (Tab) XARELTO 15 MG         SulfaSALAzine (Tab) AZULFIDINE 500 MG Take 500 mg by mouth 4 times a day.        Tadalafil (PAH) (Tab) Tadalafil (PAH) 20 MG Take 2 Tabs by mouth every day.        Temazepam (Cap) RESTORIL 30 MG Take 1 Cap by mouth at bedtime as needed for Sleep.        TraMADol HCl (Tab) ULTRAM 50 MG Take 50 mg by mouth every four hours as needed.        Treprostinil   Inhale 1.74 mg by  mouth. Inhale 12 puffs orally four times daily   Indications: 1.74mg/2.9ml        Triamcinolone Acetonide (Cream) KENALOG 0.1 %         Umeclidinium-Vilanterol (AEROSOL POWDER, BREATH ACTIVATED) ANORO ELLIPTA 62.5-25 MCG/INH         .                 Medicines prescribed today were sent to:     [facility] - [address]    [address] [place] [address] [address] [facility] [address]    Phone: [phone] Fax: [phone]    Open 24 Hours?: No    [id number] - [place], [place] - [address]    [address] [place] [address]    Phone: [phone] Fax: [phone]    Open 24 Hours?: No      Medication refill instructions:       If your prescription bottle indicates you have medication refills left, it is not necessary to call your provider’s office. Please contact your pharmacy and they will refill your medication.    If your prescription bottle indicates you do not have any refills left, you may request refills at any time through one of the following ways: The online [employer] system (except Urgent Care), by calling your provider’s office, or by asking your pharmacy to contact your provider’s office with a refill request. Medication refills are processed only during regular business hours and may not be available until the next business day. Your provider may request additional information or to have a follow-up visit with you prior to refilling your medication.   *Please Note: Medication refills are assigned a new Rx number when refilled electronically. Your pharmacy may indicate that no refills were authorized even though a new prescription for the same medication is available at the pharmacy. Please request the medicine by name with the pharmacy before contacting your provider for a refill.           [employer] Access Code: Activation code not generated  Current [employer] Status: Active

## 2017-08-23 NOTE — NON-PROVIDER
Kaylynn Polo is a 66 y.o. female here for a non-provider visit for venipuncture.     If abnormal was an in office provider notified today (if so, indicate provider)? Yes  Routed to PCP? Yes

## 2017-08-23 NOTE — MR AVS SNAPSHOT
Kaylynn Polo   2017 8:45 AM   Non-Provider Visit   MRN: [id number]    Department:  [facility]   Dept Phone:  [phone]    Description:  Female : 1950   Provider:  [place] BUTTS           Reason for Visit     Other venipuncture      Allergies as of 2017     Allergen Noted Reactions    Latex 2016   Anaphylaxis    Blisters on skin    Tape 2016       Blisters on skin    Warfarin Sodium 2016   Hives      You were diagnosed with     Essential hypertension   [4374309]       Hypothyroidism due to acquired atrophy of thyroid   [6747528]         Vital Signs     Smoking Status                   Never Smoker            Basic Information     Date Of Birth Sex Race Ethnicity Preferred Language    1950 Female White Non- English      Problem List              ICD-10-CM Priority Class Noted - Resolved    Pulmonary hypertension (CMS-HCC) I27.2   2016 - Present    Rheumatoid arthritis involving multiple sites with positive rheumatoid factor (CMS-[facility]) M05.79   2016 - Present    Hypertension I10   2016 - Present    Neuropathy (CMS-[facility]) G62.9   2016 - Present    Iron (Fe) deficiency anemia D50.9   2016 - Present    Vitamin B12 deficiency E53.8   2016 - Present    Hypothyroidism due to acquired atrophy of thyroid E03.4   2016 - Present    Pulmonary embolism (CMS-[facility]) I26.99   2016 - Present    Multiple thyroid nodules E04.2   2016 - Present    Lumbar discogenic pain syndrome M51.26   2016 - Present    Dysphagia R13.10   2016 - Present    SOB (shortness of breath) R06.02   2017 - Present    Acute bronchitis due to Haemophilus influenzae J20.1   2017 - Present    Hyperglycemia R73.9   5/3/2017 - Present    Abdominal discomfort, generalized R10.84   2017 - Present      Health Maintenance        Date Due Completion Dates    IMM DTaP/Tdap/Td Vaccine (1 - Tdap) 1971 ---    PAP SMEAR 1973 ---    MAMMOGRAM 11/17/1992 ---    COLONOSCOPY 11/17/2002 ---    IMM ZOSTER VACCINE 11/17/2012 ---    IMM INFLUENZA (1) 9/1/2017 ---            Current Immunizations     No immunizations on file.      Below and/or attached are the medications your provider expects you to take. Review all of your home medications and newly ordered medications with your provider and/or pharmacist. Follow medication instructions as directed by your provider and/or pharmacist. Please keep your medication list with you and share with your provider. Update the information when medications are discontinued, doses are changed, or new medications (including over-the-counter products) are added; and carry medication information at all times in the event of emergency situations     Allergies:  LATEX - Anaphylaxis     TAPE - (reactions not documented)     WARFARIN SODIUM - Hives               Medications  Valid as of: August 23, 2017 -  9:43 AM    Generic Name Brand Name Tablet Size Instructions for use    Albuterol Sulfate (Aero Soln) albuterol 108 (90 Base) MCG/ACT Inhale 2 Puffs by mouth every 6 hours as needed for Shortness of Breath.        ALPRAZolam (Tab) XANAX 0.5 MG         Amoxicillin (Tab) AMOXIL 875 MG Take 1 Tab by mouth 2 times a day.        Amoxicillin (Tab) AMOXIL 875 MG Take 1 Tab by mouth 2 times a day.        Ascorbic Acid (Tab) Vitamin C 1000 MG Take 1 Tab by mouth every day.        Calcium (Tab) OS-LUIS ARMANDO 500 500 MG Take 1,000 mg by mouth 2 times a day, with meals.        Cholecalciferol (Cap) Vitamin D3 1000 units Take 1 Cap by mouth every day.        Cyanocobalamin (Tab) vitamin B-12 1000 MCG Take 1,000 mcg by mouth every day.        Digoxin (Tab) LANOXIN 125 MCG Take 125 mcg by mouth every day.        DilTIAZem HCl (Tab) CARDIZEM 30 MG Take 30 mg by mouth 4 times a day.        Ferrous Sulfate (Tab) Iron 325 (65 Fe) MG Take 1 Tab by mouth every day.        Fluticasone Propionate (Suspension) FLONASE 50 MCG/ACT Spray 1 Spray in nose  every day.        Folic Acid (Tab) FOLVITE 1 MG Take 1 mg by mouth every day.        Folic Acid-Vit B6-Vit B12 (Tab) VIRT-GILLIAN 2.5-25-1 MG Take 2.5 mg by mouth every day.        Furosemide (Tab) LASIX 40 MG Take 40 mg by mouth 3 times a day.        Gabapentin (Tab) NEURONTIN 600 MG         Gabapentin (Tab) NEURONTIN 800 MG         Hydroxychloroquine Sulfate (Tab) PLAQUENIL 200 MG Take 200 mg by mouth 2 times a day.        Leflunomide (Tab) ARAVA 20 MG Take 20 mg by mouth every day.        Levothyroxine Sodium (Tab) SYNTHROID 137 MCG Take 1 Tab by mouth Every morning on an empty stomach.        Macitentan (Tab) Macitentan 10 MG Take 1 Tab by mouth every day.        NON SPECIFIED   Rx continuous O2 at 2L n/c   New O2 concentrator  Lowest O2 sat 82%        NON SPECIFIED   Rx continuous O2 at 2L n/c  New O2 concentrator  Lowest O2 sat 82%  SOB without use of O2        Pantoprazole Sodium (Tablet Delayed Response) PROTONIX 40 MG Take 40 mg by mouth 2 times a day.        Potassium Chloride (Tab CR) KLOR-CON 10 MEQ         Potassium Chloride Jennifer CR (Tab CR) Kdur 20 MEQ Take 20 mEq by mouth every day.        Rivaroxaban (Tab) XARELTO 20 MG Take 15 mg by mouth with dinner.        Rivaroxaban (Tab) XARELTO 15 MG         SulfaSALAzine (Tab) AZULFIDINE 500 MG Take 500 mg by mouth 4 times a day.        Tadalafil (PAH) (Tab) Tadalafil (PAH) 20 MG Take 2 Tabs by mouth every day.        Temazepam (Cap) RESTORIL 30 MG Take 1 Cap by mouth at bedtime as needed for Sleep.        TraMADol HCl (Tab) ULTRAM 50 MG Take 50 mg by mouth every four hours as needed.        Treprostinil   Inhale 1.74 mg by mouth. Inhale 12 puffs orally four times daily   Indications: 1.74mg/2.9ml        Triamcinolone Acetonide (Cream) KENALOG 0.1 %         Umeclidinium-Vilanterol (AEROSOL POWDER, BREATH ACTIVATED) ANORO ELLIPTA 62.5-25 MCG/INH         .                 Medicines prescribed today were sent to:     [facility] - [place], [place] - [address]  [address] [facility] [address]    Phone: [phone] Fax: [phone]    Open 24 Hours?: No    [facility] - [place], [place] - [address]    [address] [place] [address]    Phone: [phone] Fax: [phone]    Open 24 Hours?: No      Medication refill instructions:       If your prescription bottle indicates you have medication refills left, it is not necessary to call your provider’s office. Please contact your pharmacy and they will refill your medication.    If your prescription bottle indicates you do not have any refills left, you may request refills at any time through one of the following ways: The online [employer] system (except Urgent Care), by calling your provider’s office, or by asking your pharmacy to contact your provider’s office with a refill request. Medication refills are processed only during regular business hours and may not be available until the next business day. Your provider may request additional information or to have a follow-up visit with you prior to refilling your medication.   *Please Note: Medication refills are assigned a new Rx number when refilled electronically. Your pharmacy may indicate that no refills were authorized even though a new prescription for the same medication is available at the pharmacy. Please request the medicine by name with the pharmacy before contacting your provider for a refill.           [employer] Access Code: Activation code not generated  Current [employer] Status: Active

## 2017-08-24 NOTE — PROGRESS NOTES
CC: Three-month follow-up visit    Verified identification with patient. Secured video conference with RN presenter in [facility]      HPI:   Kaylynn presents today with the following.    1. Pulmonary hypertension (CMS-HCC)  Patient was seen by her pulmonologist, Dr. Triplett, at [facility] on August 17. Patient will be referred to a cardiologist to review most recent echocardiogram on August 30. Patient continues current plan of care with xarelto and Tadalafil. O2 dependent.    2. Rheumatoid arthritis involving multiple sites with positive rheumatoid factor (CMS-[facility])  Patient also completed her rheumatology follow-up via telemedicine. Patient continues on Plaquenil and Arava with the addition of sulfasalazine for increased pain in the joints. Patient has also increased her gabapentin to 800 mg 3 times a day. 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 Next follow-up rheumatology appointment is November.    3. Lumbar discogenic pain syndrome  Patient will be seen and evaluated by a neurosurgeon in [facility] for surgical intervention. Patient will need pulmonology and cardiology clearance which is pending. Patient has an appointment September 1 at [facility].    4. Abdominal discomfort, generalized  Patient complains of a three-week history of mild abdominal bloating and looser stools. Patient denies blood in the stool, pain on bowel movement, no nausea or vomiting. Patient has slightly decreased appetite but is keeping well hydrated.      Patient Active Problem List    Diagnosis Date Noted   • Abdominal discomfort, generalized 08/23/2017   • Hyperglycemia 05/03/2017   • SOB (shortness of breath) 04/05/2017   • Acute bronchitis due to Haemophilus influenzae 04/05/2017   • Lumbar discogenic pain syndrome 07/27/2016   • Dysphagia 07/27/2016   • Pulmonary hypertension (CMS-HCC) 06/13/2016   • Rheumatoid arthritis involving multiple sites with positive rheumatoid factor (CMS-[facility]) 06/13/2016    • Hypertension 06/13/2016   • Neuropathy (CMS-HCC) 06/13/2016   • Iron (Fe) deficiency anemia 06/13/2016   • Vitamin B12 deficiency 06/13/2016   • Hypothyroidism due to acquired atrophy of thyroid 06/13/2016   • Pulmonary embolism (CMS-HCC) 06/13/2016   • Multiple thyroid nodules 06/13/2016       Current Outpatient Prescriptions   Medication Sig Dispense Refill   • sulfaSALAzine (AZULFIDINE) 500 MG Tab Take 500 mg by mouth 4 times a day.     • Ferrous Sulfate (IRON) 325 (65 FE) MG Tab Take 1 Tab by mouth every day. 30 Tab 5   • fluticasone (FLONASE) 50 MCG/ACT nasal spray Spray 1 Spray in nose every day. 16 g 6   • alprazolam (XANAX) 0.5 MG Tab      • gabapentin (NEURONTIN) 800 MG tablet      • levothyroxine (SYNTHROID) 137 MCG Tab Take 1 Tab by mouth Every morning on an empty stomach. 90 Tab 2   • VIRT-GILLIAN 2.5-25-1 MG Tab Take 2.5 mg by mouth every day. 30 Tab 11   • temazepam (RESTORIL) 30 MG capsule Take 1 Cap by mouth at bedtime as needed for Sleep. 90 Cap 0   • amoxicillin (AMOXIL) 875 MG tablet Take 1 Tab by mouth 2 times a day. 14 Tab 0   • albuterol 108 (90 BASE) MCG/ACT Aero Soln inhalation aerosol Inhale 2 Puffs by mouth every 6 hours as needed for Shortness of Breath. 8.5 g 1   • amoxicillin (AMOXIL) 875 MG tablet Take 1 Tab by mouth 2 times a day. 14 Tab 0   • potassium chloride ER (KLOR-CON) 10 MEQ tablet      • XARELTO 15 MG Tab tablet      • triamcinolone acetonide (KENALOG) 0.1 % Cream      • gabapentin (NEURONTIN) 600 MG tablet      • ANORO ELLIPTA 62.5-25 MCG/INH AEROSOL POWDER, BREATH ACTIVATED      • NON SPECIFIED Rx continuous O2 at 2L n/c  New O2 concentrator  Lowest O2 sat 82%  SOB without use of O2 1 Each 11   • NON SPECIFIED Rx continuous O2 at 2L n/c   New O2 concentrator  Lowest O2 sat 82% 1 Device 6   • Tadalafil, PAH, 20 MG Tab Take 2 Tabs by mouth every day.     • calcium carbonate (OS-LUIS ARMANDO 500) 500 MG Tab Take 1,000 mg by mouth 2 times a day, with meals.     • Cholecalciferol  "(VITAMIN D3) 1000 UNITS Cap Take 1 Cap by mouth every day.     • digoxin (LANOXIN) 125 MCG Tab Take 125 mcg by mouth every day.     • diltiazem (CARDIZEM) 30 MG Tab Take 30 mg by mouth 4 times a day.     • folic acid (FOLVITE) 1 MG Tab Take 1 mg by mouth every day.     • furosemide (LASIX) 40 MG Tab Take 40 mg by mouth 3 times a day.     • hydroxychloroquine (PLAQUENIL) 200 MG Tab Take 200 mg by mouth 2 times a day.     • leflunomide (ARAVA) 20 MG Tab Take 20 mg by mouth every day.     • Macitentan 10 MG Tab Take 1 Tab by mouth every day.     • pantoprazole (PROTONIX) 40 MG Tablet Delayed Response Take 40 mg by mouth 2 times a day.     • potassium chloride SA (K-DUR) 20 MEQ Tab CR Take 20 mEq by mouth every day.     • tramadol (ULTRAM) 50 MG Tab Take 50 mg by mouth every four hours as needed.     • Cyanocobalamin (VITAMIN B-12) 1000 MCG Tab Take 1,000 mcg by mouth every day.     • Ascorbic Acid (VITAMIN C) 1000 MG Tab Take 1 Tab by mouth every day.     • rivaroxaban (XARELTO) 20 MG Tab tablet Take 15 mg by mouth with dinner.     • Treprostinil (TYVASO INH) Inhale 1.74 mg by mouth. Inhale 12 puffs orally four times daily   Indications: 1.74mg/2.9ml       No current facility-administered medications for this visit.         Allergies as of 08/23/2017 - Pedro Luis as Reviewed 08/23/2017   Allergen Reaction Noted   • Latex Anaphylaxis 06/13/2016   • Tape  06/13/2016   • Warfarin sodium Hives 06/13/2016        ROS: As per HPI. Patient denies all other cardiopulmonary, GI, neurologic, musculoskeletal symptoms    /67 mmHg  Pulse 65  Temp(Src) 36.4 °C (97.6 °F)  Resp 16  Ht 1.651 m (5' 5\")  Wt 75.297 kg (166 lb)  BMI 27.62 kg/m2  SpO2 91%    Physical Exam:  Gen:         Alert and oriented, No apparent distress.  Neck:        No Lymphadenopathy.  Lungs:     Clear to auscultation, decreased breath sounds throughout. No wheezing rhonchi or crackles  CV:          Regular rate and rhythm. No murmurs, rubs or gallops.  Abd: "         Soft non tender, non distended. Normal active bowel sounds.  No  Hepatosplenomegaly, No pulsatile masses.                   Ext:          No clubbing, cyanosis, edema.      Assessment and Plan.   66 y.o. female with the following issues.    1. Pulmonary hypertension (CMS-HCC)  Most recent pulmonology office visits reviewed  Patient stable, continue current plan of care and follow-up as scheduled at [facility]    2. Rheumatoid arthritis involving multiple sites with positive rheumatoid factor (CMS-HCC)  Stable with current plan of care  Medications reviewed with patient    3. Lumbar discogenic pain syndrome  Patient to obtain pulmonology and cardiology clearance  Keep consultation visit with neurosurgeon at [facility]    4. Abdominal discomfort, generalized  Physical exam benign. Recommend probiotic. Possibly side effect of new medication, sulfasalazine. Continue to monitor. Keep well-hydrated            Please note that this dictation was created using voice recognition software. I have made every reasonable attempt to correct obvious errors, but expect that there are errors of grammar and possible content that I did not discover before finalizing note.

## 2017-09-21 DIAGNOSIS — G47.00 INSOMNIA, UNSPECIFIED TYPE: ICD-10-CM

## 2017-09-22 RX ORDER — TEMAZEPAM 30 MG/1
30 CAPSULE ORAL NIGHTLY PRN
Qty: 90 CAP | Refills: 1 | Status: SHIPPED | OUTPATIENT
Start: 2017-09-22 | End: 2017-12-28 | Stop reason: SDUPTHER

## 2017-10-24 DIAGNOSIS — D50.9 IRON DEFICIENCY ANEMIA, UNSPECIFIED IRON DEFICIENCY ANEMIA TYPE: ICD-10-CM

## 2017-10-24 NOTE — TELEPHONE ENCOUNTER
Was the patient seen in the last year in this department? Yes     Does patient have an active prescription for medications requested? Yes     Received Request Via: Pharmacy       Requested Prescriptions     Pending Prescriptions Disp Refills   • Ferrous Sulfate (IRON) 325 (65 Fe) MG Tab 30 Tab 5     Sig: Take 1 Tab by mouth every day.         To be filled via [facility]

## 2017-10-25 RX ORDER — PNV NO.95/FERROUS FUM/FOLIC AC 28MG-0.8MG
1 TABLET ORAL DAILY
Qty: 30 TAB | Refills: 5 | Status: SHIPPED | OUTPATIENT
Start: 2017-10-25 | End: 2017-11-02 | Stop reason: SDUPTHER

## 2017-11-02 DIAGNOSIS — D50.9 IRON DEFICIENCY ANEMIA, UNSPECIFIED IRON DEFICIENCY ANEMIA TYPE: ICD-10-CM

## 2017-11-02 RX ORDER — PNV NO.95/FERROUS FUM/FOLIC AC 28MG-0.8MG
1 TABLET ORAL DAILY
Qty: 30 TAB | Refills: 5 | Status: SHIPPED | OUTPATIENT
Start: 2017-11-02 | End: 2018-04-17 | Stop reason: SDUPTHER

## 2017-11-02 NOTE — TELEPHONE ENCOUNTER
Was the patient seen in the last year in this department? Yes     Does patient have an active prescription for medications requested? Yes     Received Request Via: Patient     Requested Prescriptions     Pending Prescriptions Disp Refills   • Ferrous Sulfate (IRON) 325 (65 Fe) MG Tab 30 Tab 5     Sig: Take 1 Tab by mouth every day.           To be filled via [facility]

## 2017-11-29 ENCOUNTER — APPOINTMENT (OUTPATIENT)
Dept: RADIOLOGY | Facility: IMAGING CENTER | Age: 67
End: 2017-11-29
Payer: COMMERCIAL

## 2017-11-29 ENCOUNTER — TELEMEDICINE2 (OUTPATIENT)
Dept: MEDICAL GROUP | Age: 67
End: 2017-11-29
Payer: COMMERCIAL

## 2017-11-29 ENCOUNTER — TELEMEDICINE ORIGINATING SITE VISIT (OUTPATIENT)
Dept: MEDICAL GROUP | Facility: CLINIC | Age: 67
End: 2017-11-29
Payer: COMMERCIAL

## 2017-11-29 ENCOUNTER — NON-PROVIDER VISIT (OUTPATIENT)
Dept: MEDICAL GROUP | Facility: CLINIC | Age: 67
End: 2017-11-29
Payer: COMMERCIAL

## 2017-11-29 VITALS
TEMPERATURE: 98.4 F | BODY MASS INDEX: 27.66 KG/M2 | HEIGHT: 65 IN | RESPIRATION RATE: 16 BRPM | HEART RATE: 77 BPM | WEIGHT: 166 LBS | SYSTOLIC BLOOD PRESSURE: 117 MMHG | DIASTOLIC BLOOD PRESSURE: 73 MMHG | OXYGEN SATURATION: 90 %

## 2017-11-29 DIAGNOSIS — I27.20 PULMONARY HYPERTENSION (HCC): ICD-10-CM

## 2017-11-29 DIAGNOSIS — M05.79 RHEUMATOID ARTHRITIS INVOLVING MULTIPLE SITES WITH POSITIVE RHEUMATOID FACTOR (HCC): ICD-10-CM

## 2017-11-29 DIAGNOSIS — M51.26 LUMBAR DISCOGENIC PAIN SYNDROME: ICD-10-CM

## 2017-11-29 DIAGNOSIS — R73.9 HYPERGLYCEMIA: ICD-10-CM

## 2017-11-29 DIAGNOSIS — R09.81 NASAL CONGESTION: ICD-10-CM

## 2017-11-29 PROCEDURE — 71020 DX-CHEST-2 VIEWS: CPT | Mod: TC | Performed by: INTERNAL MEDICINE

## 2017-11-29 PROCEDURE — 99214 OFFICE O/P EST MOD 30 MIN: CPT | Mod: GT | Performed by: INTERNAL MEDICINE

## 2017-11-29 PROCEDURE — 36415 COLL VENOUS BLD VENIPUNCTURE: CPT | Performed by: INTERNAL MEDICINE

## 2017-11-29 RX ORDER — INFLUENZA VIRUS VACCINE 15; 15; 15; 15 UG/.5ML; UG/.5ML; UG/.5ML; UG/.5ML
SUSPENSION INTRAMUSCULAR
Refills: 0 | COMMUNITY
Start: 2017-09-01 | End: 2019-05-15

## 2017-11-29 RX ORDER — FLUTICASONE PROPIONATE 50 MCG
1 SPRAY, SUSPENSION (ML) NASAL DAILY
Qty: 16 G | Refills: 6 | Status: SHIPPED | OUTPATIENT
Start: 2017-11-29

## 2017-11-29 ASSESSMENT — PATIENT HEALTH QUESTIONNAIRE - PHQ9: CLINICAL INTERPRETATION OF PHQ2 SCORE: 2

## 2017-11-29 NOTE — PROGRESS NOTES
CC: Three-month follow-up visit    Verified identification with patient. Secured video conference with RN presenter in [facility]      HPI:   Kaylynn presents today with the following.    1. Pulmonary hypertension  Patient was seen by her pulmonologist, Dr. Triplett, at [facility] earlier this month. Patient was recently cleared for upcoming laminectomy per neurosurgery. Patient continues current plan of care with xarelto and Tadalafil. O2 dependent . No new medications. Currently on lung transplant list.    2. Rheumatoid arthritis involving multiple sites with positive rheumatoid factor (CMS-HCC)  Patient also completed her rheumatology follow-up via telemedicine. Patient continues on Plaquenil and Arava with the addition of sulfasalazine for increased pain in the joints. Sulfasalazine was recently increased to 4 tablets daily. Doing much better. Patient has also increased her gabapentin to 800 mg 3 times a day. 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     3. Lumbar discogenic pain syndrome  Patient is scheduled for laminectomy on December 14 with Dr. Loomis in [place]. Patient completed an EKG and echocardiogram. Patient will need a chest x-ray to complete her preop evaluation. CBC, CMP has been completed. Patient will be released after surgery, return to [facility] and will have follow-up appointment with her surgeon on February 2. Patient lives with her  who will be helping.        Patient Active Problem List    Diagnosis Date Noted   • Abdominal discomfort, generalized 08/23/2017   • Hyperglycemia 05/03/2017   • SOB (shortness of breath) 04/05/2017   • Acute bronchitis due to Haemophilus influenzae 04/05/2017   • Lumbar discogenic pain syndrome 07/27/2016   • Dysphagia 07/27/2016   • Pulmonary hypertension 06/13/2016   • Rheumatoid arthritis involving multiple sites with positive rheumatoid factor (CMS-HCC) 06/13/2016   • Hypertension 06/13/2016   •  Neuropathy (CMS-HCC) 06/13/2016   • Iron (Fe) deficiency anemia 06/13/2016   • Vitamin B12 deficiency 06/13/2016   • Hypothyroidism due to acquired atrophy of thyroid 06/13/2016   • Pulmonary embolism (CMS-HCC) 06/13/2016   • Multiple thyroid nodules 06/13/2016       Current Outpatient Prescriptions   Medication Sig Dispense Refill   • fluticasone (FLONASE) 50 MCG/ACT nasal spray Spray 1 Spray in nose every day. 16 g 6   • FLUARIX QUADRIVALENT 0.5 ML Suspension Prefilled Syringe injection TO BE ADMINISTERED BY THE PHARMACIST FOR IMMUNIZATION  0   • Ferrous Sulfate (IRON) 325 (65 Fe) MG Tab Take 1 Tab by mouth every day. 30 Tab 5   • temazepam (RESTORIL) 30 MG capsule Take 1 Cap by mouth at bedtime as needed for Sleep. 90 Cap 1   • sulfaSALAzine (AZULFIDINE) 500 MG Tab Take 200 mg by mouth 4 times a day.     • alprazolam (XANAX) 0.5 MG Tab      • gabapentin (NEURONTIN) 800 MG tablet      • levothyroxine (SYNTHROID) 137 MCG Tab Take 1 Tab by mouth Every morning on an empty stomach. 90 Tab 2   • VIRT-GILLIAN 2.5-25-1 MG Tab Take 2.5 mg by mouth every day. 30 Tab 11   • amoxicillin (AMOXIL) 875 MG tablet Take 1 Tab by mouth 2 times a day. 14 Tab 0   • albuterol 108 (90 BASE) MCG/ACT Aero Soln inhalation aerosol Inhale 2 Puffs by mouth every 6 hours as needed for Shortness of Breath. 8.5 g 1   • amoxicillin (AMOXIL) 875 MG tablet Take 1 Tab by mouth 2 times a day. 14 Tab 0   • potassium chloride ER (KLOR-CON) 10 MEQ tablet      • XARELTO 15 MG Tab tablet      • triamcinolone acetonide (KENALOG) 0.1 % Cream      • gabapentin (NEURONTIN) 600 MG tablet      • ANORO ELLIPTA 62.5-25 MCG/INH AEROSOL POWDER, BREATH ACTIVATED      • NON SPECIFIED Rx continuous O2 at 2L n/c  New O2 concentrator  Lowest O2 sat 82%  SOB without use of O2 1 Each 11   • NON SPECIFIED Rx continuous O2 at 2L n/c   New O2 concentrator  Lowest O2 sat 82% 1 Device 6   • Tadalafil, PAH, 20 MG Tab Take 2 Tabs by mouth every day.     • calcium carbonate (OS-LUIS ARMANDO  "500) 500 MG Tab Take 1,000 mg by mouth 2 times a day, with meals.     • Cholecalciferol (VITAMIN D3) 1000 UNITS Cap Take 1 Cap by mouth every day.     • digoxin (LANOXIN) 125 MCG Tab Take 125 mcg by mouth every day.     • diltiazem (CARDIZEM) 30 MG Tab Take 30 mg by mouth 4 times a day.     • folic acid (FOLVITE) 1 MG Tab Take 1 mg by mouth every day.     • furosemide (LASIX) 40 MG Tab Take 40 mg by mouth 3 times a day.     • hydroxychloroquine (PLAQUENIL) 200 MG Tab Take 200 mg by mouth 2 times a day.     • leflunomide (ARAVA) 20 MG Tab Take 20 mg by mouth every day.     • Macitentan 10 MG Tab Take 1 Tab by mouth every day.     • pantoprazole (PROTONIX) 40 MG Tablet Delayed Response Take 40 mg by mouth 2 times a day.     • potassium chloride SA (K-DUR) 20 MEQ Tab CR Take 20 mEq by mouth every day.     • tramadol (ULTRAM) 50 MG Tab Take 50 mg by mouth every four hours as needed.     • Cyanocobalamin (VITAMIN B-12) 1000 MCG Tab Take 1,000 mcg by mouth every day.     • Ascorbic Acid (VITAMIN C) 1000 MG Tab Take 1 Tab by mouth every day.     • rivaroxaban (XARELTO) 20 MG Tab tablet Take 15 mg by mouth with dinner.     • Treprostinil (TYVASO INH) Inhale 1.74 mg by mouth. Inhale 12 puffs orally four times daily   Indications: 1.74mg/2.9ml       No current facility-administered medications for this visit.          Allergies as of 11/29/2017 - Reviewed 11/29/2017   Allergen Reaction Noted   • Latex Anaphylaxis 06/13/2016   • Tape  06/13/2016   • Warfarin sodium Hives 06/13/2016        ROS: As per HPI.Complains of right bicep pain. Chronic. Patient denies all other cardiopulmonary, GI, neurologic, musculoskeletal symptoms at this time.    /73   Pulse 77   Temp 36.9 °C (98.4 °F)   Resp 16   Ht 1.651 m (5' 5\")   Wt 75.3 kg (166 lb)   SpO2 90%   BMI 27.62 kg/m²     Physical Exam:  Gen:         Alert and oriented, No apparent distress.  Neck:        No Lymphadenopathy or Bruits.  Lungs:     Decreased breath sounds " throughout, no wheezes rhonchi or crackles noted.  CV:          Regular rate and rhythm. + murmurs, S1 and S2 heard  Abd:         Soft non tender, non distended. Normal active bowel sounds.  No  Hepatosplenomegaly, No pulsatile masses.                   Ext:          No clubbing, cyanosis, edema.      Assessment and Plan.   67 y.o. female with the following issues.    1. Pulmonary hypertension  Stable with current plan of care  Follow-up appointment with her pulmonologist    - DX-CHEST-2 VIEWS; Future    2. Rheumatoid arthritis involving multiple sites with positive rheumatoid factor (CMS-HCC)  Stable with current plan of care  Keep follow-up with rheumatology    3. Lumbar discogenic pain syndrome  Release of information from neurosurgery  CBC, CMP completed.  Chest x-ray ordered for preop.  RTC one month    4. Nasal congestion    - fluticasone (FLONASE) 50 MCG/ACT nasal spray; Spray 1 Spray in nose every day.  Dispense: 16 g; Refill: 6            Please note that this dictation was created using voice recognition software. I have made every reasonable attempt to correct obvious errors, but expect that there are errors of grammar and possible content that I did not discover before finalizing note.

## 2017-11-29 NOTE — PROGRESS NOTES
Kaylynn Polo is a 67 y.o. female here for a non-provider visit for Venipuncture    If abnormal was an in office provider notified upon receipt of results (if so, indicate provider)? Yes  Routed to PCP? Yes

## 2017-12-13 ENCOUNTER — TELEPHONE (OUTPATIENT)
Dept: MEDICAL GROUP | Facility: CLINIC | Age: 67
End: 2017-12-13

## 2017-12-13 DIAGNOSIS — M54.2 NECK PAIN: ICD-10-CM

## 2017-12-13 NOTE — TELEPHONE ENCOUNTER
Please see media for Pre-Op Imaging:    Cervical Spine AP & Lateral Obliques Odontoid with Swimmers Flexion & Extension (8 Views)   Dx:  Rheumatoid Arthritis

## 2017-12-14 ENCOUNTER — APPOINTMENT (OUTPATIENT)
Dept: RADIOLOGY | Facility: IMAGING CENTER | Age: 67
End: 2017-12-14
Attending: INTERNAL MEDICINE
Payer: COMMERCIAL

## 2017-12-14 DIAGNOSIS — M54.2 NECK PAIN: ICD-10-CM

## 2017-12-14 PROCEDURE — 72052 X-RAY EXAM NECK SPINE 6/>VWS: CPT | Mod: TC | Performed by: INTERNAL MEDICINE

## 2017-12-28 ENCOUNTER — NON-PROVIDER VISIT (OUTPATIENT)
Dept: MEDICAL GROUP | Facility: CLINIC | Age: 67
End: 2017-12-28
Payer: COMMERCIAL

## 2017-12-28 ENCOUNTER — TELEPHONE (OUTPATIENT)
Dept: MEDICAL GROUP | Facility: CLINIC | Age: 67
End: 2017-12-28

## 2017-12-28 ENCOUNTER — TELEMEDICINE ORIGINATING SITE VISIT (OUTPATIENT)
Dept: MEDICAL GROUP | Facility: CLINIC | Age: 67
End: 2017-12-28
Payer: COMMERCIAL

## 2017-12-28 ENCOUNTER — TELEMEDICINE2 (OUTPATIENT)
Dept: MEDICAL GROUP | Age: 67
End: 2017-12-28
Payer: COMMERCIAL

## 2017-12-28 VITALS
HEIGHT: 65 IN | HEART RATE: 81 BPM | RESPIRATION RATE: 18 BRPM | BODY MASS INDEX: 27.66 KG/M2 | DIASTOLIC BLOOD PRESSURE: 74 MMHG | OXYGEN SATURATION: 87 % | WEIGHT: 166 LBS | TEMPERATURE: 99.4 F | SYSTOLIC BLOOD PRESSURE: 123 MMHG

## 2017-12-28 DIAGNOSIS — R73.9 HYPERGLYCEMIA: ICD-10-CM

## 2017-12-28 DIAGNOSIS — Z79.899 MEDICATION MANAGEMENT: ICD-10-CM

## 2017-12-28 DIAGNOSIS — I10 ESSENTIAL HYPERTENSION: ICD-10-CM

## 2017-12-28 DIAGNOSIS — M48.062 SPINAL STENOSIS OF LUMBAR REGION WITH NEUROGENIC CLAUDICATION: ICD-10-CM

## 2017-12-28 DIAGNOSIS — G47.00 INSOMNIA, UNSPECIFIED TYPE: ICD-10-CM

## 2017-12-28 LAB
HBA1C MFR BLD: 4.8 % (ref ?–5.8)
INT CON NEG: NEGATIVE
INT CON POS: POSITIVE

## 2017-12-28 PROCEDURE — 99214 OFFICE O/P EST MOD 30 MIN: CPT | Mod: GT | Performed by: INTERNAL MEDICINE

## 2017-12-28 RX ORDER — TEMAZEPAM 30 MG/1
30 CAPSULE ORAL NIGHTLY PRN
Qty: 90 CAP | Refills: 2 | OUTPATIENT
Start: 2017-12-28 | End: 2018-03-28

## 2017-12-28 RX ORDER — LEVOTHYROXINE SODIUM 137 UG/1
137 TABLET ORAL
Qty: 90 TAB | Refills: 2 | Status: SHIPPED | OUTPATIENT
Start: 2017-12-28 | End: 2018-10-03 | Stop reason: SDUPTHER

## 2017-12-28 RX ORDER — RIVAROXABAN 15 MG/1
15 TABLET, FILM COATED ORAL
Qty: 90 TAB | Refills: 3 | Status: SHIPPED | OUTPATIENT
Start: 2017-12-28 | End: 2018-10-03 | Stop reason: SDUPTHER

## 2017-12-28 ASSESSMENT — PATIENT HEALTH QUESTIONNAIRE - PHQ9: CLINICAL INTERPRETATION OF PHQ2 SCORE: 0

## 2017-12-28 NOTE — PROGRESS NOTES
CC: Follow-up neurosurgery    Verified identification with patient. Secured video conference with RN presenter in [facility]      HPI:   Kaylynn presents today with the following.    1. Spinal stenosis of lumbar region with neurogenic claudication  Patient is status post L4-L5 laminectomy, L4 foraminotomy on December 19, 2017 per Dr. Raj Recinos in [facility]. Patient has returned to [place] after one day postop observation. It was recommended that patient undergo PT/OT/ST 3-5 times a week for 1-2 weeks. Patient lives in [facility] and is quite a distance from the nearest inpatient rehab facility. Outpatient home health care and physical [place] Patient will not be able to drive daily to follow-up with therapy. She is recovering well. Patient complains of occasional right lower extremity radiculopathy, paresthesia around the buttocks but is able to ambulate in size the home. Denies bowel or bladder issues. Patient denies imbalance or falls. Patient complains of a bit of dizziness when standing up. Blood pressure has been stable. Patient denies increased shortness of breath, chest pain, lower extremity edema, palpitations or headaches.    2. Hyperglycemia  It was noted as an inpatient that the patient's blood sugar was fluctuating and required 2 units of insulin perioperatively. No history of diabetes. Patient was a bit concerned.    3. Medication management  Patient will need a refill on temazepam, levothyroxine, Xarelto through [facility]      Patient Active Problem List    Diagnosis Date Noted   • Spinal stenosis of lumbar region with neurogenic claudication 12/28/2017   • Medication management 12/28/2017   • Abdominal discomfort, generalized 08/23/2017   • Hyperglycemia 05/03/2017   • SOB (shortness of breath) 04/05/2017   • Acute bronchitis due to Haemophilus influenzae 04/05/2017   • Lumbar discogenic pain syndrome 07/27/2016   • Dysphagia 07/27/2016   • Pulmonary hypertension  06/13/2016   • Rheumatoid arthritis involving multiple sites with positive rheumatoid factor (CMS-HCC) 06/13/2016   • Hypertension 06/13/2016   • Neuropathy (CMS-HCC) 06/13/2016   • Iron (Fe) deficiency anemia 06/13/2016   • Vitamin B12 deficiency 06/13/2016   • Hypothyroidism due to acquired atrophy of thyroid 06/13/2016   • Pulmonary embolism (CMS-HCC) 06/13/2016   • Multiple thyroid nodules 06/13/2016       Current Outpatient Prescriptions   Medication Sig Dispense Refill   • FLUARIX QUADRIVALENT 0.5 ML Suspension Prefilled Syringe injection TO BE ADMINISTERED BY THE PHARMACIST FOR IMMUNIZATION  0   • fluticasone (FLONASE) 50 MCG/ACT nasal spray Spray 1 Spray in nose every day. 16 g 6   • Ferrous Sulfate (IRON) 325 (65 Fe) MG Tab Take 1 Tab by mouth every day. 30 Tab 5   • temazepam (RESTORIL) 30 MG capsule Take 1 Cap by mouth at bedtime as needed for Sleep. 90 Cap 1   • sulfaSALAzine (AZULFIDINE) 500 MG Tab Take 200 mg by mouth 4 times a day.     • alprazolam (XANAX) 0.5 MG Tab      • gabapentin (NEURONTIN) 800 MG tablet      • levothyroxine (SYNTHROID) 137 MCG Tab Take 1 Tab by mouth Every morning on an empty stomach. 90 Tab 2   • VIRT-GILLIAN 2.5-25-1 MG Tab Take 2.5 mg by mouth every day. 30 Tab 11   • amoxicillin (AMOXIL) 875 MG tablet Take 1 Tab by mouth 2 times a day. 14 Tab 0   • albuterol 108 (90 BASE) MCG/ACT Aero Soln inhalation aerosol Inhale 2 Puffs by mouth every 6 hours as needed for Shortness of Breath. 8.5 g 1   • amoxicillin (AMOXIL) 875 MG tablet Take 1 Tab by mouth 2 times a day. 14 Tab 0   • potassium chloride ER (KLOR-CON) 10 MEQ tablet      • XARELTO 15 MG Tab tablet      • triamcinolone acetonide (KENALOG) 0.1 % Cream      • gabapentin (NEURONTIN) 600 MG tablet      • ANORO ELLIPTA 62.5-25 MCG/INH AEROSOL POWDER, BREATH ACTIVATED      • NON SPECIFIED Rx continuous O2 at 2L n/c  New O2 concentrator  Lowest O2 sat 82%  SOB without use of O2 1 Each 11   • NON SPECIFIED Rx continuous O2 at 2L n/c  "  New O2 concentrator  Lowest O2 sat 82% 1 Device 6   • Tadalafil, PAH, 20 MG Tab Take 2 Tabs by mouth every day.     • calcium carbonate (OS-LUIS ARMANDO 500) 500 MG Tab Take 1,000 mg by mouth 2 times a day, with meals.     • Cholecalciferol (VITAMIN D3) 1000 UNITS Cap Take 1 Cap by mouth every day.     • digoxin (LANOXIN) 125 MCG Tab Take 125 mcg by mouth every day.     • diltiazem (CARDIZEM) 30 MG Tab Take 30 mg by mouth 4 times a day.     • folic acid (FOLVITE) 1 MG Tab Take 1 mg by mouth every day.     • furosemide (LASIX) 40 MG Tab Take 40 mg by mouth 3 times a day.     • hydroxychloroquine (PLAQUENIL) 200 MG Tab Take 200 mg by mouth 2 times a day.     • leflunomide (ARAVA) 20 MG Tab Take 20 mg by mouth every day.     • Macitentan 10 MG Tab Take 1 Tab by mouth every day.     • pantoprazole (PROTONIX) 40 MG Tablet Delayed Response Take 40 mg by mouth 2 times a day.     • potassium chloride SA (K-DUR) 20 MEQ Tab CR Take 20 mEq by mouth every day.     • tramadol (ULTRAM) 50 MG Tab Take 50 mg by mouth every four hours as needed.     • Cyanocobalamin (VITAMIN B-12) 1000 MCG Tab Take 1,000 mcg by mouth every day.     • Ascorbic Acid (VITAMIN C) 1000 MG Tab Take 1 Tab by mouth every day.     • rivaroxaban (XARELTO) 20 MG Tab tablet Take 15 mg by mouth with dinner.     • Treprostinil (TYVASO INH) Inhale 1.74 mg by mouth. Inhale 12 puffs orally four times daily   Indications: 1.74mg/2.9ml       No current facility-administered medications for this visit.          Allergies as of 12/28/2017 - Reviewed 12/28/2017   Allergen Reaction Noted   • Latex Anaphylaxis 06/13/2016   • Tape  06/13/2016   • Warfarin sodium Hives 06/13/2016        ROS: As per HPI.Patient denies all other cardiopulmonary, GI, neurologic symptoms.    /74   Pulse 81   Temp 37.4 °C (99.4 °F)   Resp 18   Ht 1.651 m (5' 5\")   Wt 75.3 kg (166 lb)   SpO2 (!) 87%   BMI 27.62 kg/m²     Physical Exam:  Gen:         Alert and oriented, No apparent " distress.  HEENT:       No Lymphadenopathy or Bruits. Tympanic membranes with chronic changes, no acute findings. No drainage.  Lungs:     Decreased breath sounds throughout, air excursion noted in all lung fields. No crackles noted.  CV:          Regular rate and rhythm. No murmurs, rubs or gallops. S1-S2 heard  Abd:         Soft non tender, non distended. Normal active bowel sounds.  No  Hepatosplenomegaly, No pulsatile masses.                   Ext:          No clubbing, cyanosis, edema.  Musculoskeletal:   Patient instructed to not flex or extend lumbar spine  Neuro:     Lower extremity muscle strength 4/5 bilaterally. Patient ambulating and able to stand up from a chair without difficulty.    Assessment and Plan.   67 y.o. female with the following issues.    1. Spinal stenosis of lumbar region with neurogenic claudication  Referral placed to inpatient physical rehab facility in [place].  Patient is unable to travel long distances after recent lumbar surgery    - REFERRAL TO PHYSICIAL MEDICINE REHAB    2. Hyperglycemia  Hemoglobin A1c 4.8  Monitor    3. Medication management  All medications reviewed and appropriate medications refilled for 90 day supply from [facility]    RTC 6 weeks, face-to-face visit with  in [place] at the end of February            Please note that this dictation was created using voice recognition software. I have made every reasonable attempt to correct obvious errors, but expect that there are errors of grammar and possible content that I did not discover before finalizing note.

## 2017-12-28 NOTE — PROGRESS NOTES
Kaylynn Polo is a 67 y.o. female here for a non-provider visit for A1C     If abnormal was an in office provider notified today (if so, indicate provider)? Yes  Routed to PCP? Yes

## 2018-01-02 ENCOUNTER — TELEPHONE (OUTPATIENT)
Dept: MEDICAL GROUP | Facility: CLINIC | Age: 68
End: 2018-01-02

## 2018-01-02 NOTE — TELEPHONE ENCOUNTER
Re: patient's request for inpatient PT/OT/[facility] does not accept outpatients. They need to be transferred from an inpatient setting or ER. However, the physician's nurse at [facility] said if you will call her on her personal cell phone [phone], she will get you in touch with a physician who may accept her over the phone.

## 2018-01-02 NOTE — TELEPHONE ENCOUNTER
VOICEMAIL  1. Caller Name: Kaylynn                      Call Back Number: [phone] (home)     2. Message: Confirming RX for Temazepam received. Returned call, no answer, m approved and sent to [facility]    3. Patient approves office to leave a detailed voicemail/MyChart message: yes

## 2018-02-27 ENCOUNTER — OFFICE VISIT (OUTPATIENT)
Dept: MEDICAL GROUP | Facility: CLINIC | Age: 68
End: 2018-02-27
Payer: COMMERCIAL

## 2018-02-27 VITALS
OXYGEN SATURATION: 91 % | TEMPERATURE: 98.6 F | BODY MASS INDEX: 28.82 KG/M2 | HEART RATE: 70 BPM | RESPIRATION RATE: 16 BRPM | WEIGHT: 173 LBS | HEIGHT: 65 IN | DIASTOLIC BLOOD PRESSURE: 70 MMHG | SYSTOLIC BLOOD PRESSURE: 146 MMHG

## 2018-02-27 DIAGNOSIS — M25.511 CHRONIC RIGHT SHOULDER PAIN: ICD-10-CM

## 2018-02-27 DIAGNOSIS — M05.79 RHEUMATOID ARTHRITIS INVOLVING MULTIPLE SITES WITH POSITIVE RHEUMATOID FACTOR (HCC): ICD-10-CM

## 2018-02-27 DIAGNOSIS — I27.20 PULMONARY HYPERTENSION (HCC): ICD-10-CM

## 2018-02-27 DIAGNOSIS — Z79.899 MEDICATION MANAGEMENT: ICD-10-CM

## 2018-02-27 DIAGNOSIS — M48.062 SPINAL STENOSIS OF LUMBAR REGION WITH NEUROGENIC CLAUDICATION: ICD-10-CM

## 2018-02-27 DIAGNOSIS — K21.9 GASTROESOPHAGEAL REFLUX DISEASE WITHOUT ESOPHAGITIS: ICD-10-CM

## 2018-02-27 DIAGNOSIS — G89.29 CHRONIC RIGHT SHOULDER PAIN: ICD-10-CM

## 2018-02-27 PROCEDURE — 99214 OFFICE O/P EST MOD 30 MIN: CPT | Performed by: INTERNAL MEDICINE

## 2018-02-27 RX ORDER — OXYCODONE HYDROCHLORIDE 5 MG/1
TABLET ORAL
COMMUNITY
Start: 2017-12-20 | End: 2019-08-27

## 2018-03-01 NOTE — PROGRESS NOTES
CC: 6 week follow-up appointment    Verified identification with patient. Secured video conference with RN presenter in [facility]      HPI:   Kaylynn presents today with the following.    1. Spinal stenosis of lumbar region with neurogenic claudication  Patient is status post L4-L5 laminectomy, L4 foraminotomy on December 19, 2017 per Dr. Raj Recinos in [facility]. Patient has returned to [facility] after one day postop observation. It was recommended that patient undergo PT/OT/ST 3-5 times a week for 1-2 weeks. Patient was able to be admitted to inpatient physical rehab at [facility] in [place]. Patient stayed in the rehab facility for 2 days underwent intensive PT/OT and then was discharged with home physical therapy instructions. Patient had follow-up with her surgeon in February 2 and was cleared. Patient feels the procedure was a success, states that her low back pain has significantly improved and denies any numbness or tingling, imbalance or falls.    2. Chronic right shoulder pain  Patient complains of right shoulder pain since her surgery. Patient has a history of rotator cuff repair on that shoulder back in 2003. Since her procedure for her back patient has noted that she has difficulty with over head stretch, unable to place her arm behind her back and is very stiff.    3. Pulmonary hypertension  Patient has an appointment with her pulmonologist,Dr. Triplett, in [facility] in April. Pulmonary rehab was recommended. Patient has tried to contact pulmonary rehab facilities in [place] but was told she will need to establish with their own pulmonologist as a new patient before being referred. Patient would drive to [place] for outpatient pulmonary rehab if she needs to.    4. Rheumatoid arthritis involving multiple sites with positive rheumatoid factor (CMS-[facility])  Patient continues on Plaquenil and Arava with the addition of sulfasalazine for increased pain in the joints. Sulfasalazine was recently  increased to 4 tablets daily. This increased dose is causing to have GERD-like symptoms which are moderately severe and has been using over-the-counter Tums which only slightly helps. Patient's rheumatologist is requesting labs to be completed which include CBC, CMP, CRP and ESR.    5. Medication management  Patient would like refills to be sent to [facility].    6. Gastroesophageal reflux disease without esophagitis  Increasing GERD symptoms most likely secondary to increase in sulfasalazine.       Patient Active Problem List    Diagnosis Date Noted   • Chronic right shoulder pain 02/27/2018   • Gastroesophageal reflux disease without esophagitis 02/27/2018   • Spinal stenosis of lumbar region with neurogenic claudication 12/28/2017   • Medication management 12/28/2017   • Abdominal discomfort, generalized 08/23/2017   • Hyperglycemia 05/03/2017   • SOB (shortness of breath) 04/05/2017   • Acute bronchitis due to Haemophilus influenzae 04/05/2017   • Lumbar discogenic pain syndrome 07/27/2016   • Dysphagia 07/27/2016   • Pulmonary hypertension 06/13/2016   • Rheumatoid arthritis involving multiple sites with positive rheumatoid factor (CMS-HCC) 06/13/2016   • Hypertension 06/13/2016   • Neuropathy (CMS-HCC) 06/13/2016   • Iron (Fe) deficiency anemia 06/13/2016   • Vitamin B12 deficiency 06/13/2016   • Hypothyroidism due to acquired atrophy of thyroid 06/13/2016   • Pulmonary embolism (CMS-HCC) 06/13/2016   • Multiple thyroid nodules 06/13/2016       Current Outpatient Prescriptions   Medication Sig Dispense Refill   • oxyCODONE immediate-release (ROXICODONE) 5 MG Tab      • XARELTO 15 MG Tab tablet Take 1 Tab by mouth with dinner. 90 Tab 3   • temazepam (RESTORIL) 30 MG capsule Take 1 Cap by mouth at bedtime as needed for Sleep for up to 90 days. 90 Cap 2   • levothyroxine (SYNTHROID) 137 MCG Tab Take 1 Tab by mouth Every morning on an empty stomach. 90 Tab 2   • FLUARIX QUADRIVALENT 0.5 ML Suspension  Prefilled Syringe injection TO BE ADMINISTERED BY THE PHARMACIST FOR IMMUNIZATION  0   • fluticasone (FLONASE) 50 MCG/ACT nasal spray Spray 1 Spray in nose every day. 16 g 6   • Ferrous Sulfate (IRON) 325 (65 Fe) MG Tab Take 1 Tab by mouth every day. 30 Tab 5   • sulfaSALAzine (AZULFIDINE) 500 MG Tab Take 200 mg by mouth 4 times a day.     • alprazolam (XANAX) 0.5 MG Tab      • gabapentin (NEURONTIN) 800 MG tablet      • VIRT-GILLIAN 2.5-25-1 MG Tab Take 2.5 mg by mouth every day. 30 Tab 11   • amoxicillin (AMOXIL) 875 MG tablet Take 1 Tab by mouth 2 times a day. 14 Tab 0   • albuterol 108 (90 BASE) MCG/ACT Aero Soln inhalation aerosol Inhale 2 Puffs by mouth every 6 hours as needed for Shortness of Breath. 8.5 g 1   • amoxicillin (AMOXIL) 875 MG tablet Take 1 Tab by mouth 2 times a day. 14 Tab 0   • potassium chloride ER (KLOR-CON) 10 MEQ tablet      • triamcinolone acetonide (KENALOG) 0.1 % Cream      • gabapentin (NEURONTIN) 600 MG tablet      • ANORO ELLIPTA 62.5-25 MCG/INH AEROSOL POWDER, BREATH ACTIVATED      • NON SPECIFIED Rx continuous O2 at 2L n/c  New O2 concentrator  Lowest O2 sat 82%  SOB without use of O2 1 Each 11   • NON SPECIFIED Rx continuous O2 at 2L n/c   New O2 concentrator  Lowest O2 sat 82% 1 Device 6   • Tadalafil, PAH, 20 MG Tab Take 2 Tabs by mouth every day.     • calcium carbonate (OS-LUIS ARMANDO 500) 500 MG Tab Take 1,000 mg by mouth 2 times a day, with meals.     • Cholecalciferol (VITAMIN D3) 1000 UNITS Cap Take 1 Cap by mouth every day.     • digoxin (LANOXIN) 125 MCG Tab Take 125 mcg by mouth every day.     • diltiazem (CARDIZEM) 30 MG Tab Take 30 mg by mouth 4 times a day.     • folic acid (FOLVITE) 1 MG Tab Take 1 mg by mouth every day.     • furosemide (LASIX) 40 MG Tab Take 40 mg by mouth 3 times a day.     • hydroxychloroquine (PLAQUENIL) 200 MG Tab Take 200 mg by mouth 2 times a day.     • leflunomide (ARAVA) 20 MG Tab Take 20 mg by mouth every day.     • Macitentan 10 MG Tab Take 1 Tab  "by mouth every day.     • pantoprazole (PROTONIX) 40 MG Tablet Delayed Response Take 40 mg by mouth 2 times a day.     • potassium chloride SA (K-DUR) 20 MEQ Tab CR Take 20 mEq by mouth 3 times a day.     • tramadol (ULTRAM) 50 MG Tab Take 50 mg by mouth every four hours as needed.     • Cyanocobalamin (VITAMIN B-12) 1000 MCG Tab Take 1,000 mcg by mouth every day.     • Ascorbic Acid (VITAMIN C) 1000 MG Tab Take 1 Tab by mouth every day.     • rivaroxaban (XARELTO) 20 MG Tab tablet Take 15 mg by mouth with dinner.     • Treprostinil (TYVASO INH) Inhale 1.74 mg by mouth. Inhale 12 puffs orally four times daily   Indications: 1.74mg/2.9ml       No current facility-administered medications for this visit.          Allergies as of 02/27/2018 - Reviewed 02/27/2018   Allergen Reaction Noted   • Latex Anaphylaxis 06/13/2016   • Tape  06/13/2016   • Warfarin sodium Hives 06/13/2016        ROS: As per HPI. Patient denies all other cardiopulmonary, GI, neurologic, musculoskeletal symptoms    /70   Pulse 70   Temp 37 °C (98.6 °F)   Resp 16   Ht 1.651 m (5' 5\")   Wt 78.5 kg (173 lb)   SpO2 91%   BMI 28.79 kg/m²     Physical Exam:  Gen:         Alert and oriented, No apparent distress.  Neck:        No Lymphadenopathy . Neck is supple.  Lungs:     Decreased breath sounds throughout, no wheezes rhonchi or crackles heard  CV:          Regular rate and rhythm. Positive systolic murmur. S1-S2 heard  Abd:         Soft non tender, non distended. Normal active bowel sounds.  No  Hepatosplenomegaly, No pulsatile masses.                   Ext:          No clubbing, cyanosis, edema.  Musculoskeletal:   Decreased range of motion of right shoulder girdle. Decrease abduction past 45°. Adequate passive range of motion.    Assessment and Plan.   67 y.o. female with the following issues.    1. Spinal stenosis of lumbar region with neurogenic claudication  Stable  Continue home physical therapy as instructed    2. Chronic right " shoulder pain    - DX-SHOULDER 2+ RIGHT; Future    3. Pulmonary hypertension  Patient will provide contact information for pulmonary rehab facility in [place]. Possible pulmonology referral via telemedicine if available.  ? Pulmonary rehab through [facility]    4. Rheumatoid arthritis involving multiple sites with positive rheumatoid factor (CMS-[facility])  Follow-up appointment with rheumatologist in April    - COMP METABOLIC PANEL; Future  - CRP QUANTITIVE (NON-CARDIAC); Future  - WESTERGREN SED RATE; Future  - CBC WITH DIFFERENTIAL; Future    5. Medication management  Reviewed medications to be refilled    6. Gastroesophageal reflux disease without esophagitis  PPI ×2 weeks  Follow-up with rheumatology to discuss medications            Please note that this dictation was created using voice recognition software. I have made every reasonable attempt to correct obvious errors, but expect that there are errors of grammar and possible content that I did not discover before finalizing note.

## 2018-03-06 ENCOUNTER — NON-PROVIDER VISIT (OUTPATIENT)
Dept: MEDICAL GROUP | Facility: CLINIC | Age: 68
End: 2018-03-06
Payer: COMMERCIAL

## 2018-03-06 DIAGNOSIS — I27.20 PULMONARY HYPERTENSION (HCC): ICD-10-CM

## 2018-03-06 PROCEDURE — 36415 COLL VENOUS BLD VENIPUNCTURE: CPT | Performed by: INTERNAL MEDICINE

## 2018-03-12 ENCOUNTER — APPOINTMENT (OUTPATIENT)
Dept: RADIOLOGY | Facility: IMAGING CENTER | Age: 68
End: 2018-03-12
Payer: COMMERCIAL

## 2018-03-12 DIAGNOSIS — M25.511 CHRONIC RIGHT SHOULDER PAIN: ICD-10-CM

## 2018-03-12 DIAGNOSIS — G89.29 CHRONIC RIGHT SHOULDER PAIN: ICD-10-CM

## 2018-03-12 PROCEDURE — 73030 X-RAY EXAM OF SHOULDER: CPT | Mod: TC,RT | Performed by: INTERNAL MEDICINE

## 2018-03-28 ENCOUNTER — TELEMEDICINE ORIGINATING SITE VISIT (OUTPATIENT)
Dept: MEDICAL GROUP | Facility: CLINIC | Age: 68
End: 2018-03-28
Payer: COMMERCIAL

## 2018-03-28 ENCOUNTER — TELEMEDICINE2 (OUTPATIENT)
Dept: MEDICAL GROUP | Age: 68
End: 2018-03-28
Payer: COMMERCIAL

## 2018-03-28 VITALS
BODY MASS INDEX: 29.32 KG/M2 | OXYGEN SATURATION: 89 % | HEART RATE: 73 BPM | HEIGHT: 65 IN | TEMPERATURE: 97.2 F | RESPIRATION RATE: 16 BRPM | SYSTOLIC BLOOD PRESSURE: 129 MMHG | WEIGHT: 176 LBS | DIASTOLIC BLOOD PRESSURE: 81 MMHG

## 2018-03-28 DIAGNOSIS — M05.79 RHEUMATOID ARTHRITIS INVOLVING MULTIPLE SITES WITH POSITIVE RHEUMATOID FACTOR (HCC): ICD-10-CM

## 2018-03-28 DIAGNOSIS — M25.511 CHRONIC RIGHT SHOULDER PAIN: ICD-10-CM

## 2018-03-28 DIAGNOSIS — G89.29 CHRONIC RIGHT SHOULDER PAIN: ICD-10-CM

## 2018-03-28 DIAGNOSIS — I27.20 PULMONARY HYPERTENSION (HCC): ICD-10-CM

## 2018-03-28 PROCEDURE — 99214 OFFICE O/P EST MOD 30 MIN: CPT | Performed by: INTERNAL MEDICINE

## 2018-03-29 ENCOUNTER — TELEPHONE (OUTPATIENT)
Dept: MEDICAL GROUP | Facility: CLINIC | Age: 68
End: 2018-03-29

## 2018-03-29 NOTE — PROGRESS NOTES
CC: One month follow-up visit    Verified identification with patient. Secured video conference with RN presenter in [facility]      HPI:   Kaylynn presents today with the following.    1. Chronic right shoulder pain  Patient continues to have worsening right shoulder pain with limited range of motion. Patient has a history of right rotator cuff repair. Symptoms have worsened since she underwent back surgery. X-ray of the shoulder shows no acute abnormalities.    2. Pulmonary hypertension  Patient is followed by [facility] pulmonology. Pulmonary rehabilitation is recommended and referral placed and requested. Referral placed to [facility] rehab in [place]. Patient has a follow-up with pulmonology in April.    3. Rheumatoid arthritis involving multiple sites with positive rheumatoid factor (CMS-[facility])  Patient is currently stable on her Plaquenil. Labs were completed and followed up with her rheumatologist. ESR, CRP within normal limits. Hemoglobin A1c 4.8. Patient has follow-up appointment with rheumatology in May.      Patient Active Problem List    Diagnosis Date Noted   • Chronic right shoulder pain 02/27/2018   • Gastroesophageal reflux disease without esophagitis 02/27/2018   • Spinal stenosis of lumbar region with neurogenic claudication 12/28/2017   • Medication management 12/28/2017   • Abdominal discomfort, generalized 08/23/2017   • Hyperglycemia 05/03/2017   • SOB (shortness of breath) 04/05/2017   • Acute bronchitis due to Haemophilus influenzae 04/05/2017   • Lumbar discogenic pain syndrome 07/27/2016   • Dysphagia 07/27/2016   • Pulmonary hypertension 06/13/2016   • Rheumatoid arthritis involving multiple sites with positive rheumatoid factor (CMS-HCC) 06/13/2016   • Hypertension 06/13/2016   • Neuropathy (CMS-[facility]) 06/13/2016   • Iron (Fe) deficiency anemia 06/13/2016   • Vitamin B12 deficiency 06/13/2016   • Hypothyroidism due to acquired atrophy of thyroid 06/13/2016   • Pulmonary embolism (CMS-[facility])  06/13/2016   • Multiple thyroid nodules 06/13/2016       Current Outpatient Prescriptions   Medication Sig Dispense Refill   • oxyCODONE immediate-release (ROXICODONE) 5 MG Tab      • XARELTO 15 MG Tab tablet Take 1 Tab by mouth with dinner. 90 Tab 3   • levothyroxine (SYNTHROID) 137 MCG Tab Take 1 Tab by mouth Every morning on an empty stomach. 90 Tab 2   • FLUARIX QUADRIVALENT 0.5 ML Suspension Prefilled Syringe injection TO BE ADMINISTERED BY THE PHARMACIST FOR IMMUNIZATION  0   • fluticasone (FLONASE) 50 MCG/ACT nasal spray Spray 1 Spray in nose every day. 16 g 6   • Ferrous Sulfate (IRON) 325 (65 Fe) MG Tab Take 1 Tab by mouth every day. 30 Tab 5   • sulfaSALAzine (AZULFIDINE) 500 MG Tab Take 200 mg by mouth 4 times a day.     • alprazolam (XANAX) 0.5 MG Tab      • gabapentin (NEURONTIN) 800 MG tablet      • VIRT-GILLIAN 2.5-25-1 MG Tab Take 2.5 mg by mouth every day. 30 Tab 11   • amoxicillin (AMOXIL) 875 MG tablet Take 1 Tab by mouth 2 times a day. 14 Tab 0   • albuterol 108 (90 BASE) MCG/ACT Aero Soln inhalation aerosol Inhale 2 Puffs by mouth every 6 hours as needed for Shortness of Breath. 8.5 g 1   • amoxicillin (AMOXIL) 875 MG tablet Take 1 Tab by mouth 2 times a day. 14 Tab 0   • potassium chloride ER (KLOR-CON) 10 MEQ tablet      • triamcinolone acetonide (KENALOG) 0.1 % Cream      • gabapentin (NEURONTIN) 600 MG tablet      • ANORO ELLIPTA 62.5-25 MCG/INH AEROSOL POWDER, BREATH ACTIVATED      • NON SPECIFIED Rx continuous O2 at 2L n/c  New O2 concentrator  Lowest O2 sat 82%  SOB without use of O2 1 Each 11   • NON SPECIFIED Rx continuous O2 at 2L n/c   New O2 concentrator  Lowest O2 sat 82% 1 Device 6   • Tadalafil, PAH, 20 MG Tab Take 2 Tabs by mouth every day.     • calcium carbonate (OS-LUIS ARMANDO 500) 500 MG Tab Take 1,000 mg by mouth 2 times a day, with meals.     • Cholecalciferol (VITAMIN D3) 1000 UNITS Cap Take 1 Cap by mouth every day.     • digoxin (LANOXIN) 125 MCG Tab Take 125 mcg by mouth every  "day.     • diltiazem (CARDIZEM) 30 MG Tab Take 30 mg by mouth 4 times a day.     • folic acid (FOLVITE) 1 MG Tab Take 1 mg by mouth every day.     • furosemide (LASIX) 40 MG Tab Take 40 mg by mouth 3 times a day.     • hydroxychloroquine (PLAQUENIL) 200 MG Tab Take 200 mg by mouth 2 times a day.     • leflunomide (ARAVA) 20 MG Tab Take 20 mg by mouth every day.     • Macitentan 10 MG Tab Take 1 Tab by mouth every day.     • pantoprazole (PROTONIX) 40 MG Tablet Delayed Response Take 40 mg by mouth 2 times a day.     • potassium chloride SA (K-DUR) 20 MEQ Tab CR Take 20 mEq by mouth 3 times a day.     • tramadol (ULTRAM) 50 MG Tab Take 50 mg by mouth every four hours as needed.     • Cyanocobalamin (VITAMIN B-12) 1000 MCG Tab Take 1,000 mcg by mouth every day.     • Ascorbic Acid (VITAMIN C) 1000 MG Tab Take 1 Tab by mouth every day.     • rivaroxaban (XARELTO) 20 MG Tab tablet Take 15 mg by mouth with dinner.     • Treprostinil (TYVASO INH) Inhale 1.74 mg by mouth. Inhale 12 puffs orally four times daily   Indications: 1.74mg/2.9ml       No current facility-administered medications for this visit.          Allergies as of 03/28/2018 - Reviewed 03/28/2018   Allergen Reaction Noted   • Latex Anaphylaxis 06/13/2016   • Tape  06/13/2016   • Warfarin sodium Hives 06/13/2016        ROS: As per HPI. Denies all other cardiopulmonary, GI, neurologic symptoms at this time.    /81   Pulse 73   Temp 36.2 °C (97.2 °F)   Resp 16   Ht 1.651 m (5' 5\")   Wt 79.8 kg (176 lb)   SpO2 89%   BMI 29.29 kg/m²     Physical Exam:  Gen:         Alert and oriented, No apparent distress.  Neck:        No Lymphadenopathy or Bruits. Neck is supple  Lungs:     Clear to auscultation bilaterally, good air excursion.  CV:          Regular rate and rhythm. No murmurs, rubs or gallops.  Abd:         Soft non tender, non distended. Normal active bowel sounds.  No  Hepatosplenomegaly, No pulsatile masses.                   Ext:          Right " shoulder girdle with limited range of motion, decrease abduction past 45°.      Assessment and Plan.   67 y.o. female with the following issues.    1. Chronic right shoulder pain  Symptoms not improving, x-ray shoulder completed  Recommend MRI of right shoulder given ongoing symptoms.    2. Pulmonary hypertension  Follow-up with pulmonology [facility] in April    - REFERRAL TO PULMONARY REHAB    3. Rheumatoid arthritis involving multiple sites with positive rheumatoid factor (CMS-HCC)  Follow-up with rheumatology in May  Labs reviewed    Patient will be leaving on vacation at the end of April for one month and will be traveling to [place].        Please note that this dictation was created using voice recognition software. I have made every reasonable attempt to correct obvious errors, but expect that there are errors of grammar and possible content that I did not discover before finalizing note.

## 2018-03-29 NOTE — TELEPHONE ENCOUNTER
Please ask patient if she would like to do her shoulder MRI prior to leaving on vacation? If so, we are which she like her imaging completed?

## 2018-04-02 DIAGNOSIS — M25.511 RIGHT SHOULDER PAIN, UNSPECIFIED CHRONICITY: ICD-10-CM

## 2018-04-16 ENCOUNTER — TELEPHONE (OUTPATIENT)
Dept: MEDICAL GROUP | Facility: CLINIC | Age: 68
End: 2018-04-16

## 2018-04-16 NOTE — TELEPHONE ENCOUNTER
Could you please review pt mri of shoulder.  Pt is inquiring on what her next steps should be.  She is leaving town on April 21st.

## 2018-04-17 ENCOUNTER — TELEPHONE (OUTPATIENT)
Dept: MEDICAL GROUP | Facility: CLINIC | Age: 68
End: 2018-04-17

## 2018-04-17 DIAGNOSIS — D50.9 IRON DEFICIENCY ANEMIA, UNSPECIFIED IRON DEFICIENCY ANEMIA TYPE: ICD-10-CM

## 2018-04-17 DIAGNOSIS — Z79.899 MEDICATION MANAGEMENT: ICD-10-CM

## 2018-04-17 DIAGNOSIS — F41.9 ANXIETY: ICD-10-CM

## 2018-04-18 ENCOUNTER — TELEPHONE (OUTPATIENT)
Dept: MEDICAL GROUP | Facility: CLINIC | Age: 68
End: 2018-04-18

## 2018-04-18 RX ORDER — PNV NO.95/FERROUS FUM/FOLIC AC 28MG-0.8MG
1 TABLET ORAL DAILY
Qty: 30 TAB | Refills: 5 | Status: SHIPPED | OUTPATIENT
Start: 2018-04-18 | End: 2018-07-06 | Stop reason: SDUPTHER

## 2018-04-18 RX ORDER — ALPRAZOLAM 0.5 MG/1
0.5 TABLET ORAL NIGHTLY PRN
Qty: 30 TAB | Refills: 2 | OUTPATIENT
Start: 2018-04-18 | End: 2018-07-16 | Stop reason: SDUPTHER

## 2018-04-18 NOTE — TELEPHONE ENCOUNTER
MRI of the shoulder shows tearing of the infraspinatus and supraspinatus tendon and bicep tendon tear. Recommend referral to orthopedics. Please inform patient and inquire regarding where she would like to be seen by an orthopedist. Thanks

## 2018-04-18 NOTE — TELEPHONE ENCOUNTER
There is no prescription for temazepam in her medication list. She does have a prescription for alprazolam. Please verify with patient. Was this prescribed by her other physicians?

## 2018-04-20 NOTE — TELEPHONE ENCOUNTER
Patient would like a referral for shoulder surgery to be sent to:    Dr. Julio Teran  [address] [place] [address], [address]  [place]  [address]  (P):  [phone]

## 2018-04-23 DIAGNOSIS — M75.101 TEAR OF RIGHT ROTATOR CUFF, UNSPECIFIED TEAR EXTENT: ICD-10-CM

## 2018-07-06 DIAGNOSIS — D50.9 IRON DEFICIENCY ANEMIA, UNSPECIFIED IRON DEFICIENCY ANEMIA TYPE: ICD-10-CM

## 2018-07-06 NOTE — TELEPHONE ENCOUNTER
Was the patient seen in the last year in this department? Yes     Does patient have an active prescription for medications requested? Yes     Received Request Via: Patient       Requested Prescriptions     Pending Prescriptions Disp Refills   • Ferrous Sulfate (IRON) 325 (65 Fe) MG Tab 30 Tab 5     Sig: Take 1 Tab by mouth every day.

## 2018-07-09 RX ORDER — PNV NO.95/FERROUS FUM/FOLIC AC 28MG-0.8MG
1 TABLET ORAL DAILY
Qty: 30 TAB | Refills: 2 | Status: SHIPPED | OUTPATIENT
Start: 2018-07-09 | End: 2018-07-16 | Stop reason: SDUPTHER

## 2018-07-16 DIAGNOSIS — Z79.899 MEDICATION MANAGEMENT: ICD-10-CM

## 2018-07-16 DIAGNOSIS — F41.9 ANXIETY: ICD-10-CM

## 2018-07-16 DIAGNOSIS — D50.9 IRON DEFICIENCY ANEMIA, UNSPECIFIED IRON DEFICIENCY ANEMIA TYPE: ICD-10-CM

## 2018-07-16 RX ORDER — ALPRAZOLAM 0.5 MG/1
0.5 TABLET ORAL NIGHTLY PRN
Qty: 30 TAB | Refills: 2 | Status: SHIPPED
Start: 2018-07-16 | End: 2018-10-03 | Stop reason: SDUPTHER

## 2018-07-16 RX ORDER — PNV NO.95/FERROUS FUM/FOLIC AC 28MG-0.8MG
1 TABLET ORAL DAILY
Qty: 30 TAB | Refills: 2 | Status: SHIPPED
Start: 2018-07-16

## 2018-10-03 DIAGNOSIS — Z79.899 MEDICATION MANAGEMENT: ICD-10-CM

## 2018-10-03 DIAGNOSIS — F41.9 ANXIETY: ICD-10-CM

## 2018-10-03 NOTE — TELEPHONE ENCOUNTER
Was the patient seen in the last year in this department? Yes    Does patient have an active prescription for medications requested? Yes    Received Request Via: Patient     Requested Prescriptions     Pending Prescriptions Disp Refills   • levothyroxine (SYNTHROID) 137 MCG Tab 90 Tab 2     Sig: Take 1 Tab by mouth Every morning on an empty stomach.   • ALPRAZolam (XANAX) 0.5 MG Tab 30 Tab 2     Sig: Take 1 Tab by mouth at bedtime as needed for Sleep for up to 90 days.   • XARELTO 15 MG Tab tablet 90 Tab 3     Sig: Take 1 Tab by mouth with dinner.

## 2018-10-04 ENCOUNTER — TELEPHONE (OUTPATIENT)
Dept: MEDICAL GROUP | Facility: CLINIC | Age: 68
End: 2018-10-04

## 2018-10-04 RX ORDER — LEVOTHYROXINE SODIUM 137 UG/1
137 TABLET ORAL
Qty: 90 TAB | Refills: 2 | Status: SHIPPED | OUTPATIENT
Start: 2018-10-04 | End: 2019-04-12 | Stop reason: SDUPTHER

## 2018-10-04 RX ORDER — ALPRAZOLAM 0.5 MG/1
0.5 TABLET ORAL NIGHTLY PRN
Qty: 30 TAB | Refills: 2 | OUTPATIENT
Start: 2018-10-04 | End: 2018-12-31 | Stop reason: SDUPTHER

## 2018-10-04 RX ORDER — RIVAROXABAN 15 MG/1
15 TABLET, FILM COATED ORAL
Qty: 90 TAB | Refills: 3 | Status: SHIPPED | OUTPATIENT
Start: 2018-10-04 | End: 2019-07-11 | Stop reason: SDUPTHER

## 2018-10-17 ENCOUNTER — TELEMEDICINE ORIGINATING SITE VISIT (OUTPATIENT)
Dept: MEDICAL GROUP | Facility: CLINIC | Age: 68
End: 2018-10-17
Payer: COMMERCIAL

## 2018-10-17 ENCOUNTER — NON-PROVIDER VISIT (OUTPATIENT)
Dept: MEDICAL GROUP | Facility: CLINIC | Age: 68
End: 2018-10-17
Payer: COMMERCIAL

## 2018-10-17 ENCOUNTER — TELEMEDICINE2 (OUTPATIENT)
Dept: MEDICAL GROUP | Age: 68
End: 2018-10-17
Payer: COMMERCIAL

## 2018-10-17 VITALS
WEIGHT: 156 LBS | HEIGHT: 65 IN | SYSTOLIC BLOOD PRESSURE: 127 MMHG | OXYGEN SATURATION: 90 % | BODY MASS INDEX: 25.99 KG/M2 | HEART RATE: 60 BPM | RESPIRATION RATE: 18 BRPM | TEMPERATURE: 97.6 F | DIASTOLIC BLOOD PRESSURE: 77 MMHG

## 2018-10-17 DIAGNOSIS — N30.01 ACUTE CYSTITIS WITH HEMATURIA: ICD-10-CM

## 2018-10-17 DIAGNOSIS — N39.0 URINARY TRACT INFECTION WITH HEMATURIA, SITE UNSPECIFIED: ICD-10-CM

## 2018-10-17 DIAGNOSIS — N28.9 RENAL INSUFFICIENCY: ICD-10-CM

## 2018-10-17 DIAGNOSIS — D64.9 LOW HEMOGLOBIN: ICD-10-CM

## 2018-10-17 DIAGNOSIS — R73.9 HYPERGLYCEMIA: ICD-10-CM

## 2018-10-17 DIAGNOSIS — M05.79 RHEUMATOID ARTHRITIS INVOLVING MULTIPLE SITES WITH POSITIVE RHEUMATOID FACTOR (HCC): ICD-10-CM

## 2018-10-17 DIAGNOSIS — R31.9 URINARY TRACT INFECTION WITH HEMATURIA, SITE UNSPECIFIED: ICD-10-CM

## 2018-10-17 LAB
APPEARANCE UR: NORMAL
BILIRUB UR STRIP-MCNC: NORMAL MG/DL
COLOR UR AUTO: NORMAL
GLUCOSE UR STRIP.AUTO-MCNC: NORMAL MG/DL
HBA1C MFR BLD: 4.5 % (ref ?–5.8)
INT CON NEG: NEGATIVE
INT CON POS: POSITIVE
KETONES UR STRIP.AUTO-MCNC: NORMAL MG/DL
LEUKOCYTE ESTERASE UR QL STRIP.AUTO: NORMAL
NITRITE UR QL STRIP.AUTO: POSITIVE
PH UR STRIP.AUTO: 5 [PH] (ref 5–8)
PROT UR QL STRIP: 100 MG/DL
RBC UR QL AUTO: NORMAL
SP GR UR STRIP.AUTO: 1.01
UROBILINOGEN UR STRIP-MCNC: 2 MG/DL

## 2018-10-17 PROCEDURE — 83036 HEMOGLOBIN GLYCOSYLATED A1C: CPT | Performed by: INTERNAL MEDICINE

## 2018-10-17 PROCEDURE — 99214 OFFICE O/P EST MOD 30 MIN: CPT | Performed by: INTERNAL MEDICINE

## 2018-10-17 PROCEDURE — 81002 URINALYSIS NONAUTO W/O SCOPE: CPT | Performed by: INTERNAL MEDICINE

## 2018-10-17 RX ORDER — SULFAMETHOXAZOLE AND TRIMETHOPRIM 800; 160 MG/1; MG/1
1 TABLET ORAL 2 TIMES DAILY
Qty: 20 TAB | Refills: 0 | Status: SHIPPED | OUTPATIENT
Start: 2018-10-17 | End: 2019-05-21

## 2018-10-17 RX ORDER — PHENAZOPYRIDINE HYDROCHLORIDE 200 MG/1
200 TABLET, FILM COATED ORAL 3 TIMES DAILY PRN
Qty: 6 TAB | Refills: 0 | Status: SHIPPED | OUTPATIENT
Start: 2018-10-17 | End: 2019-05-15

## 2018-10-17 NOTE — PROGRESS NOTES
CC: Dysuria    Verified identification with patient. Secured video conference with RN presenter in [facility]      HPI:   Kaylynn presents today with the following.    1. Acute cystitis with hematuria  Patient presents with a 2 day history of increased urinary frequency and mild dysuria and dark urine. Patient denies fevers or chills, abdominal pain, back pain. No history of recurrent urinary tract infection. Took Azo OTC last night. Symptoms not improving.    2. Hyperglycemia  Recent labs showed blood sugar 156   Strong family history of diabetes  Hemoglobin A1c today 4.5    3. Low hemoglobin  Hemoglobin level 12.3. Hemoglobin level in March was 10.9. Colonoscopy completed 2006 which was negative. Most recent FOBT was 2 years ago.  Patient noted blood on her sheets last month when she was traveling.  Patient denies bright red blood per rectum, melena.  Status post hysterectomy    4. Rheumatoid arthritis involving multiple sites with positive rheumatoid factor (HCC)  Patient follows up via telemedicine with Dr. Yarbrough.  Labs ordered and completed  Symptoms are stable  No change in plan of care    5. Renal insufficiency  BUN and creatinine 16/1.5. Creatinine level in March was 0.91.      Patient Active Problem List    Diagnosis Date Noted   • Low hemoglobin 10/17/2018   • Renal insufficiency 10/17/2018   • Chronic right shoulder pain 02/27/2018   • Gastroesophageal reflux disease without esophagitis 02/27/2018   • Spinal stenosis of lumbar region with neurogenic claudication 12/28/2017   • Medication management 12/28/2017   • Abdominal discomfort, generalized 08/23/2017   • Hyperglycemia 05/03/2017   • SOB (shortness of breath) 04/05/2017   • Acute bronchitis due to Haemophilus influenzae 04/05/2017   • Lumbar discogenic pain syndrome 07/27/2016   • Dysphagia 07/27/2016   • Pulmonary hypertension (HCC) 06/13/2016   • Rheumatoid arthritis involving multiple sites with positive rheumatoid factor (HCC) 06/13/2016    • Hypertension 06/13/2016   • Neuropathy (HCC) 06/13/2016   • Iron (Fe) deficiency anemia 06/13/2016   • Vitamin B12 deficiency 06/13/2016   • Hypothyroidism due to acquired atrophy of thyroid 06/13/2016   • Pulmonary embolism (HCC) 06/13/2016   • Multiple thyroid nodules 06/13/2016       Current Outpatient Prescriptions   Medication Sig Dispense Refill   • sulfamethoxazole-trimethoprim (BACTRIM DS) 800-160 MG tablet Take 1 Tab by mouth 2 times a day. 20 Tab 0   • phenazopyridine (PYRIDIUM) 200 MG Tab Take 1 Tab by mouth 3 times a day as needed. 6 Tab 0   • levothyroxine (SYNTHROID) 137 MCG Tab Take 1 Tab by mouth Every morning on an empty stomach. 90 Tab 2   • ALPRAZolam (XANAX) 0.5 MG Tab Take 1 Tab by mouth at bedtime as needed for Sleep for up to 90 days. 30 Tab 2   • XARELTO 15 MG Tab tablet Take 1 Tab by mouth with dinner. 90 Tab 3   • levothyroxine (SYNTHROID) 137 MCG Tab TAKE 1 TABLET BY MOUTH  EVERY MORNING ON AN EMPTY  STOMACH 90 Tab 2   • VIRT-GILLIAN 2.5-25-1 MG Tab Take 2.5 mg by mouth every day. 30 Tab 11   • Ferrous Sulfate (IRON) 325 (65 Fe) MG Tab Take 1 Tab by mouth every day. 30 Tab 2   • oxyCODONE immediate-release (ROXICODONE) 5 MG Tab      • FLUARIX QUADRIVALENT 0.5 ML Suspension Prefilled Syringe injection TO BE ADMINISTERED BY THE PHARMACIST FOR IMMUNIZATION  0   • fluticasone (FLONASE) 50 MCG/ACT nasal spray Spray 1 Spray in nose every day. 16 g 6   • sulfaSALAzine (AZULFIDINE) 500 MG Tab Take 200 mg by mouth 4 times a day.     • gabapentin (NEURONTIN) 800 MG tablet      • amoxicillin (AMOXIL) 875 MG tablet Take 1 Tab by mouth 2 times a day. 14 Tab 0   • albuterol 108 (90 BASE) MCG/ACT Aero Soln inhalation aerosol Inhale 2 Puffs by mouth every 6 hours as needed for Shortness of Breath. 8.5 g 1   • amoxicillin (AMOXIL) 875 MG tablet Take 1 Tab by mouth 2 times a day. 14 Tab 0   • potassium chloride ER (KLOR-CON) 10 MEQ tablet      • triamcinolone acetonide (KENALOG) 0.1 % Cream      •  gabapentin (NEURONTIN) 600 MG tablet      • ANORO ELLIPTA 62.5-25 MCG/INH AEROSOL POWDER, BREATH ACTIVATED      • NON SPECIFIED Rx continuous O2 at 2L n/c  New O2 concentrator  Lowest O2 sat 82%  SOB without use of O2 1 Each 11   • NON SPECIFIED Rx continuous O2 at 2L n/c   New O2 concentrator  Lowest O2 sat 82% 1 Device 6   • Tadalafil, PAH, 20 MG Tab Take 2 Tabs by mouth every day.     • calcium carbonate (OS-LUIS ARMANDO 500) 500 MG Tab Take 1,000 mg by mouth 2 times a day, with meals.     • Cholecalciferol (VITAMIN D3) 1000 UNITS Cap Take 1 Cap by mouth every day.     • digoxin (LANOXIN) 125 MCG Tab Take 125 mcg by mouth every day.     • diltiazem (CARDIZEM) 30 MG Tab Take 30 mg by mouth 4 times a day.     • folic acid (FOLVITE) 1 MG Tab Take 1 mg by mouth every day.     • furosemide (LASIX) 40 MG Tab Take 40 mg by mouth 3 times a day.     • hydroxychloroquine (PLAQUENIL) 200 MG Tab Take 200 mg by mouth 2 times a day.     • leflunomide (ARAVA) 20 MG Tab Take 20 mg by mouth every day.     • Macitentan 10 MG Tab Take 1 Tab by mouth every day.     • pantoprazole (PROTONIX) 40 MG Tablet Delayed Response Take 40 mg by mouth 2 times a day.     • potassium chloride SA (K-DUR) 20 MEQ Tab CR Take 20 mEq by mouth 3 times a day.     • tramadol (ULTRAM) 50 MG Tab Take 50 mg by mouth every four hours as needed.     • Cyanocobalamin (VITAMIN B-12) 1000 MCG Tab Take 1,000 mcg by mouth every day.     • Ascorbic Acid (VITAMIN C) 1000 MG Tab Take 1 Tab by mouth every day.     • rivaroxaban (XARELTO) 20 MG Tab tablet Take 15 mg by mouth with dinner.     • Treprostinil (TYVASO INH) Inhale 1.74 mg by mouth. Inhale 12 puffs orally four times daily   Indications: 1.74mg/2.9ml       No current facility-administered medications for this visit.          Allergies as of 10/17/2018 - Reviewed 10/17/2018   Allergen Reaction Noted   • Latex Anaphylaxis 06/13/2016   • Tape  06/13/2016   • Warfarin sodium Hives 06/13/2016        ROS: As per HPI. Denies  "all other cardiac pulmonary, GI, neurologic symptoms.    /77 (BP Location: Right arm, Patient Position: Sitting)   Pulse 60   Temp 36.4 °C (97.6 °F)   Resp 18   Ht 1.651 m (5' 5\")   Wt 70.8 kg (156 lb)   SpO2 90%   BMI 25.96 kg/m²     Physical Exam:  Gen:         Alert and oriented, No apparent distress.  Neck:        No Lymphadenopathy or Bruits.  Lungs:     Clear to auscultation bilaterally , no wheezes rhonchi or crackles  CV:          Regular rate and rhythm. No murmurs, rubs or gallops.  Abd:         Soft non tender, non distended. Normal active bowel sounds.  No  Hepatosplenomegaly, No pulsatile masses. No CVA tenderness. Mild suprapubic tenderness to palpation                  Ext:          No clubbing, cyanosis, edema.      Assessment and Plan.   67 y.o. female with the following issues.    1. Acute cystitis with hematuria  UA: Positive nitrites, large leukocyte esterase, large amount of blood. Cloudy  Send urine for culture and sensitivity  Treat with Bactrim ×10 days  Push fluids  Keep follow-up appointment on October 25    - sulfamethoxazole-trimethoprim (BACTRIM DS) 800-160 MG tablet; Take 1 Tab by mouth 2 times a day.  Dispense: 20 Tab; Refill: 0  - phenazopyridine (PYRIDIUM) 200 MG Tab; Take 1 Tab by mouth 3 times a day as needed.  Dispense: 6 Tab; Refill: 0    2. Hyperglycemia  Hemoglobin A1c 4.5  Recommend decreasing carbohydrates in diet    3. Low hemoglobin  Hemoglobin improved since last blood work  Check FIT  Recheck labs to check for B-12, iron studies    - OCCULT BLOOD FECES IMMUNOASSAY; Future    4. Rheumatoid arthritis involving multiple sites with positive rheumatoid factor (HCC)  Stable, continue current plan of care  Follow-up with rheumatology as scheduled  Review labs with rheumatology    5. Renal insufficiency  Mild renal insufficiency noted  Push fluids and keep well-hydrated  Recheck BMP next visit            Please note that this dictation was created using voice " recognition software. I have made every reasonable attempt to correct obvious errors, but expect that there are errors of grammar and possible content that I did not discover before finalizing note.

## 2018-10-17 NOTE — NON-PROVIDER
Kaylynn Polo is a 67 y.o. female here for a non-provider visit for UA    If abnormal was an in office provider notified today (if so, indicate provider)? Yes  Routed to PCP? Yes

## 2018-10-18 ENCOUNTER — HOSPITAL ENCOUNTER (OUTPATIENT)
Facility: MEDICAL CENTER | Age: 68
End: 2018-10-18
Attending: INTERNAL MEDICINE
Payer: COMMERCIAL

## 2018-10-24 DIAGNOSIS — N30.01 ACUTE CYSTITIS WITH HEMATURIA: ICD-10-CM

## 2018-10-24 DIAGNOSIS — N39.0 URINARY TRACT INFECTION WITHOUT HEMATURIA, SITE UNSPECIFIED: ICD-10-CM

## 2018-10-24 LAB
FORWARD REASON: SPWHY: NORMAL
FORWARDED TO LAB: SPWHR: NORMAL
SPECIMEN SENT: SPWT1: NORMAL

## 2018-10-24 RX ORDER — AMOXICILLIN AND CLAVULANATE POTASSIUM 875; 125 MG/1; MG/1
1 TABLET, FILM COATED ORAL 2 TIMES DAILY
Qty: 20 TAB | Refills: 0 | Status: SHIPPED | OUTPATIENT
Start: 2018-10-24 | End: 2019-05-15

## 2018-10-24 RX ORDER — SULFAMETHOXAZOLE AND TRIMETHOPRIM 800; 160 MG/1; MG/1
1 TABLET ORAL 2 TIMES DAILY
Qty: 20 TAB | Refills: 0 | Status: CANCELLED | OUTPATIENT
Start: 2018-10-24

## 2018-10-24 NOTE — TELEPHONE ENCOUNTER
Was the patient seen in the last year in this department? Yes    Does patient have an active prescription for medications requested? Yes    Received Request Via: Patient     Requested Prescriptions     Pending Prescriptions Disp Refills   • sulfamethoxazole-trimethoprim (BACTRIM DS) 800-160 MG tablet 20 Tab 0     Sig: Take 1 Tab by mouth 2 times a day.

## 2018-10-25 ENCOUNTER — APPOINTMENT (OUTPATIENT)
Dept: MEDICAL GROUP | Age: 68
End: 2018-10-25
Payer: COMMERCIAL

## 2018-10-25 ENCOUNTER — TELEPHONE (OUTPATIENT)
Dept: MEDICAL GROUP | Age: 68
End: 2018-10-25

## 2018-10-25 NOTE — TELEPHONE ENCOUNTER
Main lab called . Unable to process FIT test as patient has to get the specimen collection kit from [facility]. The specimen must be collected in the [facility] collection KIT.

## 2018-10-31 NOTE — TELEPHONE ENCOUNTER
No.  [facility] will not run the test due to her insurance being out of network for them.  She will need to recollect specimen using a [facility] kit.  We can give this to her at her next appointment.

## 2018-12-26 ENCOUNTER — TELEMEDICINE2 (OUTPATIENT)
Dept: MEDICAL GROUP | Age: 68
End: 2018-12-26
Payer: COMMERCIAL

## 2018-12-26 VITALS
WEIGHT: 167 LBS | DIASTOLIC BLOOD PRESSURE: 70 MMHG | TEMPERATURE: 98 F | SYSTOLIC BLOOD PRESSURE: 117 MMHG | HEIGHT: 65 IN | HEART RATE: 88 BPM | BODY MASS INDEX: 27.82 KG/M2 | OXYGEN SATURATION: 93 % | RESPIRATION RATE: 16 BRPM

## 2018-12-26 DIAGNOSIS — I27.20 PULMONARY HYPERTENSION (HCC): ICD-10-CM

## 2018-12-26 DIAGNOSIS — E55.9 VITAMIN D DEFICIENCY: ICD-10-CM

## 2018-12-26 DIAGNOSIS — E03.4 HYPOTHYROIDISM DUE TO ACQUIRED ATROPHY OF THYROID: ICD-10-CM

## 2018-12-26 DIAGNOSIS — N28.9 RENAL INSUFFICIENCY: ICD-10-CM

## 2018-12-26 DIAGNOSIS — Z13.220 SCREENING CHOLESTEROL LEVEL: ICD-10-CM

## 2018-12-26 DIAGNOSIS — R09.81 NASAL CONGESTION: ICD-10-CM

## 2018-12-26 DIAGNOSIS — D64.9 LOW HEMOGLOBIN: ICD-10-CM

## 2018-12-26 PROCEDURE — 99214 OFFICE O/P EST MOD 30 MIN: CPT | Performed by: INTERNAL MEDICINE

## 2018-12-26 NOTE — PROGRESS NOTES
CC: 2-month follow-up visit    Verified identification with patient. Secured video conference with RN presenter in [facility]      HPI:   Kaylynn presents today with the following.    1. Pulmonary hypertension (HCC)  Patient was seen by her pulmonologist, Dr. Roach on November 18.  Patient is continued on her 3 drug regiment and doing well without any complaints.  She is on oxygen 24/7 at 2 L and 3 L on exertion.  Patient received her Prevnar 13 with flu vaccination up-to-date.  Echocardiogram and pulmonary function tests are up-to-date completed in July.  Continue current plan of care.  Follow-up in 3 months.    2. Low hemoglobin  Patient has sent in her FOBT, no results in epic.  Patient completed colonoscopy in 2006 which was negative    3. Nasal congestion  Complains of ongoing nasal congestion with difficulty in using her oxygen.  Patient has been using over-the-counter nasal saline and nasal steroid but has not been helping lately.  Not taking an over The counter antihistamine.    4. Hypothyroidism due to acquired atrophy of thyroid  Patient is complaining of increased amount of hair loss and fatigue.  TSH in 2017 was 0.88.  She is currently on Synthroid 137 MCG's daily.    5. Screening cholesterol level  Lipid panel over 1 year ago.    6. Vitamin D deficiency  Currently not taking a vitamin D supplement    7. Renal insufficiency  Most recent creatinine was 1.3      Patient Active Problem List    Diagnosis Date Noted   • Low hemoglobin 10/17/2018   • Renal insufficiency 10/17/2018   • Chronic right shoulder pain 02/27/2018   • Gastroesophageal reflux disease without esophagitis 02/27/2018   • Spinal stenosis of lumbar region with neurogenic claudication 12/28/2017   • Medication management 12/28/2017   • Abdominal discomfort, generalized 08/23/2017   • Hyperglycemia 05/03/2017   • SOB (shortness of breath) 04/05/2017   • Acute bronchitis due to Haemophilus influenzae 04/05/2017   • Lumbar discogenic pain  syndrome 07/27/2016   • Dysphagia 07/27/2016   • Pulmonary hypertension (HCC) 06/13/2016   • Rheumatoid arthritis involving multiple sites with positive rheumatoid factor ([facility]) 06/13/2016   • Hypertension 06/13/2016   • Neuropathy ([facility]) 06/13/2016   • Iron (Fe) deficiency anemia 06/13/2016   • Vitamin B12 deficiency 06/13/2016   • Hypothyroidism due to acquired atrophy of thyroid 06/13/2016   • Pulmonary embolism (HCC) 06/13/2016   • Multiple thyroid nodules 06/13/2016       Current Outpatient Prescriptions   Medication Sig Dispense Refill   • amoxicillin-clavulanate (AUGMENTIN) 875-125 MG Tab Take 1 Tab by mouth 2 times a day. 20 Tab 0   • sulfamethoxazole-trimethoprim (BACTRIM DS) 800-160 MG tablet Take 1 Tab by mouth 2 times a day. 20 Tab 0   • phenazopyridine (PYRIDIUM) 200 MG Tab Take 1 Tab by mouth 3 times a day as needed. 6 Tab 0   • levothyroxine (SYNTHROID) 137 MCG Tab Take 1 Tab by mouth Every morning on an empty stomach. 90 Tab 2   • ALPRAZolam (XANAX) 0.5 MG Tab Take 1 Tab by mouth at bedtime as needed for Sleep for up to 90 days. 30 Tab 2   • XARELTO 15 MG Tab tablet Take 1 Tab by mouth with dinner. 90 Tab 3   • levothyroxine (SYNTHROID) 137 MCG Tab TAKE 1 TABLET BY MOUTH  EVERY MORNING ON AN EMPTY  STOMACH 90 Tab 2   • VIRT-GILLIAN 2.5-25-1 MG Tab Take 2.5 mg by mouth every day. 30 Tab 11   • Ferrous Sulfate (IRON) 325 (65 Fe) MG Tab Take 1 Tab by mouth every day. 30 Tab 2   • oxyCODONE immediate-release (ROXICODONE) 5 MG Tab      • FLUARIX QUADRIVALENT 0.5 ML Suspension Prefilled Syringe injection TO BE ADMINISTERED BY THE PHARMACIST FOR IMMUNIZATION  0   • fluticasone (FLONASE) 50 MCG/ACT nasal spray Spray 1 Spray in nose every day. 16 g 6   • sulfaSALAzine (AZULFIDINE) 500 MG Tab Take 200 mg by mouth 4 times a day.     • gabapentin (NEURONTIN) 800 MG tablet      • amoxicillin (AMOXIL) 875 MG tablet Take 1 Tab by mouth 2 times a day. 14 Tab 0   • albuterol 108 (90 BASE) MCG/ACT Aero Soln inhalation  aerosol Inhale 2 Puffs by mouth every 6 hours as needed for Shortness of Breath. 8.5 g 1   • amoxicillin (AMOXIL) 875 MG tablet Take 1 Tab by mouth 2 times a day. 14 Tab 0   • potassium chloride ER (KLOR-CON) 10 MEQ tablet      • triamcinolone acetonide (KENALOG) 0.1 % Cream      • gabapentin (NEURONTIN) 600 MG tablet      • ANORO ELLIPTA 62.5-25 MCG/INH AEROSOL POWDER, BREATH ACTIVATED      • NON SPECIFIED Rx continuous O2 at 2L n/c  New O2 concentrator  Lowest O2 sat 82%  SOB without use of O2 1 Each 11   • NON SPECIFIED Rx continuous O2 at 2L n/c   New O2 concentrator  Lowest O2 sat 82% 1 Device 6   • Tadalafil, PAH, 20 MG Tab Take 2 Tabs by mouth every day.     • calcium carbonate (OS-LUIS ARMANDO 500) 500 MG Tab Take 1,000 mg by mouth 2 times a day, with meals.     • Cholecalciferol (VITAMIN D3) 1000 UNITS Cap Take 1 Cap by mouth every day.     • digoxin (LANOXIN) 125 MCG Tab Take 125 mcg by mouth every day.     • diltiazem (CARDIZEM) 30 MG Tab Take 30 mg by mouth 4 times a day.     • folic acid (FOLVITE) 1 MG Tab Take 1 mg by mouth every day.     • furosemide (LASIX) 40 MG Tab Take 40 mg by mouth 3 times a day.     • hydroxychloroquine (PLAQUENIL) 200 MG Tab Take 200 mg by mouth 2 times a day.     • leflunomide (ARAVA) 20 MG Tab Take 20 mg by mouth every day.     • Macitentan 10 MG Tab Take 1 Tab by mouth every day.     • pantoprazole (PROTONIX) 40 MG Tablet Delayed Response Take 40 mg by mouth 2 times a day.     • potassium chloride SA (K-DUR) 20 MEQ Tab CR Take 20 mEq by mouth 3 times a day.     • tramadol (ULTRAM) 50 MG Tab Take 50 mg by mouth every four hours as needed.     • Cyanocobalamin (VITAMIN B-12) 1000 MCG Tab Take 1,000 mcg by mouth every day.     • Ascorbic Acid (VITAMIN C) 1000 MG Tab Take 1 Tab by mouth every day.     • rivaroxaban (XARELTO) 20 MG Tab tablet Take 15 mg by mouth with dinner.     • Treprostinil (TYVASO INH) Inhale 1.74 mg by mouth. Inhale 12 puffs orally four times daily   Indications:  "1.74mg/2.9ml       No current facility-administered medications for this visit.          Allergies as of 12/26/2018 - Reviewed 12/26/2018   Allergen Reaction Noted   • Latex Anaphylaxis 06/13/2016   • Tape  06/13/2016   • Warfarin sodium Hives 06/13/2016        ROS: As per HPI.  Denies all other cardiopulmonary, GI, neurologic symptoms.    /70 (BP Location: Right arm, Patient Position: Sitting)   Pulse 88   Temp 36.7 °C (98 °F)   Resp 16   Ht 1.651 m (5' 5\")   Wt 75.8 kg (167 lb)   SpO2 93%   BMI 27.79 kg/m²     Physical Exam:  Gen:         Alert and oriented, No apparent distress.  Neck:        No Lymphadenopathy or Bruits.  Lungs:     Decreased breath sounds throughout, no wheezes rhonchi or crackles.  Adequate air excursion  CV:          Regular rate and rhythm. 2/6 SUSANA  Abd:         Soft non tender, non distended. Normal active bowel sounds.  No  Hepatosplenomegaly, No pulsatile masses.                   Ext:          No clubbing, cyanosis, edema.      Assessment and Plan.   68 y.o. female with the following issues.    1. Pulmonary hypertension (HCC)  Pulmonology clinic notes reviewed per Dr. Roach  Keep follow-up appointment as scheduled  Continue current plan of care    2. Low hemoglobin  Check results of FOBT    3. Nasal congestion  Trial with Rhinocort  OTC oral antihistamine    4. Hypothyroidism due to acquired atrophy of thyroid    - THYROID PANEL WITH TSH    5. Screening cholesterol level    - Lipid Profile; Future    6. Vitamin D deficiency    - VITAMIN D,25 HYDROXY; Future    7. Renal insufficiency    - BASIC METABOLIC PANEL; Future            Please note that this dictation was created using voice recognition software. I have made every reasonable attempt to correct obvious errors, but expect that there are errors of grammar and possible content that I did not discover before finalizing note.   "

## 2018-12-31 DIAGNOSIS — Z79.899 MEDICATION MANAGEMENT: ICD-10-CM

## 2018-12-31 DIAGNOSIS — F41.9 ANXIETY: ICD-10-CM

## 2018-12-31 RX ORDER — BETAMETHASONE DIPROPIONATE 0.5 MG/G
CREAM TOPICAL
COMMUNITY
Start: 2014-10-28

## 2018-12-31 RX ORDER — TEMAZEPAM 30 MG/1
30 CAPSULE ORAL
COMMUNITY
Start: 2015-11-04 | End: 2019-07-11 | Stop reason: SDUPTHER

## 2018-12-31 RX ORDER — EMOLLIENT COMBINATION NO.32
EMULSION, EXTENDED RELEASE TOPICAL
COMMUNITY
Start: 2016-07-21

## 2018-12-31 NOTE — TELEPHONE ENCOUNTER
Was the patient seen in the last year in this department? Yes    Does patient have an active prescription for medications requested? Yes    Received Request Via: Pharmacy     Requested Prescriptions     Pending Prescriptions Disp Refills   • ALPRAZolam (XANAX) 0.5 MG Tab 30 Tab 2     Sig: Take 1 Tab by mouth at bedtime as needed for Sleep for up to 90 days.

## 2019-01-02 ENCOUNTER — NON-PROVIDER VISIT (OUTPATIENT)
Dept: MEDICAL GROUP | Facility: CLINIC | Age: 69
End: 2019-01-02
Payer: COMMERCIAL

## 2019-01-02 PROCEDURE — 36415 COLL VENOUS BLD VENIPUNCTURE: CPT | Performed by: INTERNAL MEDICINE

## 2019-01-02 RX ORDER — ALPRAZOLAM 0.5 MG/1
0.5 TABLET ORAL NIGHTLY PRN
Qty: 30 TAB | Refills: 2 | OUTPATIENT
Start: 2019-01-02 | End: 2019-04-02

## 2019-01-02 NOTE — NON-PROVIDER
Kaylynn Polo is a 68 y.o. female here for a non-provider visit for Venipuncture    If abnormal was an in office provider notified today (if so, indicate provider)? Yes  Routed to PCP? Yes

## 2019-01-16 ENCOUNTER — TELEMEDICINE2 (OUTPATIENT)
Dept: MEDICAL GROUP | Age: 69
End: 2019-01-16
Payer: COMMERCIAL

## 2019-01-16 ENCOUNTER — TELEMEDICINE ORIGINATING SITE VISIT (OUTPATIENT)
Dept: MEDICAL GROUP | Facility: CLINIC | Age: 69
End: 2019-01-16
Payer: COMMERCIAL

## 2019-01-16 ENCOUNTER — NON-PROVIDER VISIT (OUTPATIENT)
Dept: MEDICAL GROUP | Facility: CLINIC | Age: 69
End: 2019-01-16
Payer: COMMERCIAL

## 2019-01-16 VITALS
HEIGHT: 65 IN | HEART RATE: 77 BPM | WEIGHT: 167 LBS | OXYGEN SATURATION: 92 % | DIASTOLIC BLOOD PRESSURE: 80 MMHG | RESPIRATION RATE: 18 BRPM | TEMPERATURE: 99.3 F | SYSTOLIC BLOOD PRESSURE: 126 MMHG | BODY MASS INDEX: 27.82 KG/M2

## 2019-01-16 DIAGNOSIS — I27.20 PULMONARY HYPERTENSION (HCC): ICD-10-CM

## 2019-01-16 DIAGNOSIS — E03.4 HYPOTHYROIDISM DUE TO ACQUIRED ATROPHY OF THYROID: ICD-10-CM

## 2019-01-16 DIAGNOSIS — N28.9 RENAL INSUFFICIENCY: ICD-10-CM

## 2019-01-16 DIAGNOSIS — Z79.899 MEDICATION MANAGEMENT: ICD-10-CM

## 2019-01-16 DIAGNOSIS — R07.89 CHEST PRESSURE: ICD-10-CM

## 2019-01-16 DIAGNOSIS — F41.9 ANXIETY: ICD-10-CM

## 2019-01-16 DIAGNOSIS — R06.02 SOB (SHORTNESS OF BREATH): ICD-10-CM

## 2019-01-16 PROCEDURE — 99214 OFFICE O/P EST MOD 30 MIN: CPT | Performed by: INTERNAL MEDICINE

## 2019-01-16 PROCEDURE — 93000 ELECTROCARDIOGRAM COMPLETE: CPT | Performed by: INTERNAL MEDICINE

## 2019-01-16 NOTE — NON-PROVIDER
Kaylynn Polo is a 68 y.o. female here for a non-provider visit for EKG    If abnormal was an in office provider notified today (if so, indicate provider)? Yes  Routed to PCP? Yes

## 2019-01-17 RX ORDER — ALPRAZOLAM 0.5 MG/1
0.5 TABLET ORAL NIGHTLY PRN
Qty: 90 TAB | Refills: 1 | Status: SHIPPED
Start: 2019-01-17 | End: 2019-04-12 | Stop reason: SDUPTHER

## 2019-01-17 NOTE — PROGRESS NOTES
CC: Follow-up lab work    Verified identification with patient. Secured video conference with RN presenter in [facility]      HPI:   Kaylynn presents today with the following.    1. Chest pressure  Patient is here for follow-up visit at the clinic to review lab work.  Patient states that she started to have chest tightness/pressure across her anterior chest started about 3 hours ago.  Rates the discomfort at 3/10.  Symptom continuous however not worsening or improving.  Patient denies shortness of breath, CHANG, headache, dizziness, diaphoresis.  Chest tightness does not radiate to left arm jaw or back.  However she stated that she was shoveling snow yesterday afternoon.  Patient denies chest tightness worse with inspiration or movement.  Patient continues to wear oxygen 2-3 L nasal cannula.  Blood pressure has been stable.    2. Hypothyroidism due to acquired atrophy of thyroid  Patient is currently taking levothyroxine 137 MCG's daily.  Thyroid function panel not processed by lab.  Patient complains of mild fatigue and hair loss.    3. Renal insufficiency  BUN and creatinine 13/0.79.  Creatinine improved from 1.15 in October.    4. Medication management  Patient would like to have Xarelto and Xanax sent to [facility] for 3-month refills.      Patient Active Problem List    Diagnosis Date Noted   • Low hemoglobin 10/17/2018   • Renal insufficiency 10/17/2018   • Chronic right shoulder pain 02/27/2018   • Gastroesophageal reflux disease without esophagitis 02/27/2018   • Spinal stenosis of lumbar region with neurogenic claudication 12/28/2017   • Medication management 12/28/2017   • Abdominal discomfort, generalized 08/23/2017   • Hyperglycemia 05/03/2017   • SOB (shortness of breath) 04/05/2017   • Acute bronchitis due to Haemophilus influenzae 04/05/2017   • Lumbar discogenic pain syndrome 07/27/2016   • Dysphagia 07/27/2016   • Pulmonary hypertension (HCC) 06/13/2016   • Rheumatoid arthritis involving multiple  sites with positive rheumatoid factor (HCC) 06/13/2016   • Hypertension 06/13/2016   • Neuropathy ([facility]) 06/13/2016   • Iron (Fe) deficiency anemia 06/13/2016   • Vitamin B12 deficiency 06/13/2016   • Hypothyroidism due to acquired atrophy of thyroid 06/13/2016   • Pulmonary embolism ([facility]) 06/13/2016   • Multiple thyroid nodules 06/13/2016       Current Outpatient Prescriptions   Medication Sig Dispense Refill   • ALPRAZolam (XANAX) 0.5 MG Tab Take 1 Tab by mouth at bedtime as needed for Sleep for up to 90 days. 30 Tab 2   • betamethasone dipropionate (DIPROLENE) 0.05 % Cream Apply topically as needed     • Dermatological Products, Misc. (EPICERAM) Emulsion Apply  to affected area(s).     • FERROUS SULFATE PO Take 325 mg by mouth.     • MAGNESIUM-ZINC PO Take  by mouth.     • temazepam (RESTORIL) 30 MG capsule Take 30 mg by mouth.     • budesonide (RINOCORT AQUA) 32 MCG/ACT nasal spray Spray 2 Sprays in nose every day. 1 Bottle 2   • amoxicillin-clavulanate (AUGMENTIN) 875-125 MG Tab Take 1 Tab by mouth 2 times a day. 20 Tab 0   • sulfamethoxazole-trimethoprim (BACTRIM DS) 800-160 MG tablet Take 1 Tab by mouth 2 times a day. 20 Tab 0   • phenazopyridine (PYRIDIUM) 200 MG Tab Take 1 Tab by mouth 3 times a day as needed. 6 Tab 0   • levothyroxine (SYNTHROID) 137 MCG Tab Take 1 Tab by mouth Every morning on an empty stomach. 90 Tab 2   • XARELTO 15 MG Tab tablet Take 1 Tab by mouth with dinner. 90 Tab 3   • levothyroxine (SYNTHROID) 137 MCG Tab TAKE 1 TABLET BY MOUTH  EVERY MORNING ON AN EMPTY  STOMACH 90 Tab 2   • VIRT-GILLIAN 2.5-25-1 MG Tab Take 2.5 mg by mouth every day. 30 Tab 11   • Ferrous Sulfate (IRON) 325 (65 Fe) MG Tab Take 1 Tab by mouth every day. 30 Tab 2   • oxyCODONE immediate-release (ROXICODONE) 5 MG Tab      • FLUARIX QUADRIVALENT 0.5 ML Suspension Prefilled Syringe injection TO BE ADMINISTERED BY THE PHARMACIST FOR IMMUNIZATION  0   • fluticasone (FLONASE) 50 MCG/ACT nasal spray Spray 1 Spray in nose  every day. 16 g 6   • sulfaSALAzine (AZULFIDINE) 500 MG Tab Take 200 mg by mouth 4 times a day.     • gabapentin (NEURONTIN) 800 MG tablet      • amoxicillin (AMOXIL) 875 MG tablet Take 1 Tab by mouth 2 times a day. 14 Tab 0   • albuterol 108 (90 BASE) MCG/ACT Aero Soln inhalation aerosol Inhale 2 Puffs by mouth every 6 hours as needed for Shortness of Breath. 8.5 g 1   • amoxicillin (AMOXIL) 875 MG tablet Take 1 Tab by mouth 2 times a day. 14 Tab 0   • potassium chloride ER (KLOR-CON) 10 MEQ tablet      • triamcinolone acetonide (KENALOG) 0.1 % Cream      • gabapentin (NEURONTIN) 600 MG tablet      • ANORO ELLIPTA 62.5-25 MCG/INH AEROSOL POWDER, BREATH ACTIVATED      • NON SPECIFIED Rx continuous O2 at 2L n/c  New O2 concentrator  Lowest O2 sat 82%  SOB without use of O2 1 Each 11   • NON SPECIFIED Rx continuous O2 at 2L n/c   New O2 concentrator  Lowest O2 sat 82% 1 Device 6   • Tadalafil, PAH, 20 MG Tab Take 2 Tabs by mouth every day.     • calcium carbonate (OS-LUIS ARMANDO 500) 500 MG Tab Take 1,000 mg by mouth 2 times a day, with meals.     • Cholecalciferol (VITAMIN D3) 1000 UNITS Cap Take 1 Cap by mouth every day.     • digoxin (LANOXIN) 125 MCG Tab Take 125 mcg by mouth every day.     • diltiazem (CARDIZEM) 30 MG Tab Take 30 mg by mouth 4 times a day.     • folic acid (FOLVITE) 1 MG Tab Take 1 mg by mouth every day.     • furosemide (LASIX) 40 MG Tab Take 40 mg by mouth 3 times a day.     • hydroxychloroquine (PLAQUENIL) 200 MG Tab Take 200 mg by mouth 2 times a day.     • leflunomide (ARAVA) 20 MG Tab Take 20 mg by mouth every day.     • Macitentan 10 MG Tab Take 1 Tab by mouth every day.     • pantoprazole (PROTONIX) 40 MG Tablet Delayed Response Take 40 mg by mouth 2 times a day.     • potassium chloride SA (K-DUR) 20 MEQ Tab CR Take 20 mEq by mouth 3 times a day.     • tramadol (ULTRAM) 50 MG Tab Take 50 mg by mouth every four hours as needed.     • Cyanocobalamin (VITAMIN B-12) 1000 MCG Tab Take 1,000 mcg by  "mouth every day.     • Ascorbic Acid (VITAMIN C) 1000 MG Tab Take 1 Tab by mouth every day.     • rivaroxaban (XARELTO) 20 MG Tab tablet Take 15 mg by mouth with dinner.     • Treprostinil (TYVASO INH) Inhale 1.74 mg by mouth. Inhale 12 puffs orally four times daily   Indications: 1.74mg/2.9ml       No current facility-administered medications for this visit.          Allergies as of 01/16/2019 - Reviewed 01/16/2019   Allergen Reaction Noted   • Latex Anaphylaxis 06/13/2016   • Tape  06/13/2016   • Warfarin sodium Hives 06/13/2016        ROS: As per HPI.  Denies all other cardiopulmonary, GI, neurologic symptoms.    /80 (BP Location: Right arm, Patient Position: Sitting)   Pulse 77   Temp 37.4 °C (99.3 °F)   Resp 18   Ht 1.651 m (5' 5\")   Wt 75.8 kg (167 lb)   SpO2 92%   BMI 27.79 kg/m²     Physical Exam:  Gen:         Alert and oriented, No apparent distress.  Neck:        No Lymphadenopathy or Bruits.  No lymphadenopathy.  No thyromegaly.  Lungs:     Clear to auscultation bilaterally, no wheezes rhonchi or crackles  CV:          Regular rate and rhythm. No murmurs, rubs or gallops.  Abd:         Soft non tender, non distended. Normal active bowel sounds.  No  Hepatosplenomegaly, No pulsatile masses.                   Ext:          No clubbing, cyanosis, edema.      Assessment and Plan.   68 y.o. female with the following issues.    1. Chest pressure  EKG shows normal sinus rhythm, no ST segment elevations or depressions.  A nonspecific conduction delay noted.  No arrhythmias  Vitals stable.  Patient stable upon leaving the clinic.  ER precautions provided if symptoms continue or worsen in any way.  Patient states understanding.  Continue O2    - EKG - Clinic Performed    2. Hypothyroidism due to acquired atrophy of thyroid  Adjust levothyroxine pending thyroid function panel results    - THYROID PANEL WITH TSH    3. Renal insufficiency  BUN and creatinine, GFR within normal limits.  Keep " well-hydrated    4. Medication management  Xarelto and Xanax prescriptions sent [facility].            Please note that this dictation was created using voice recognition software. I have made every reasonable attempt to correct obvious errors, but expect that there are errors of grammar and possible content that I did not discover before finalizing note.

## 2019-01-23 ENCOUNTER — NON-PROVIDER VISIT (OUTPATIENT)
Dept: MEDICAL GROUP | Facility: CLINIC | Age: 69
End: 2019-01-23
Payer: COMMERCIAL

## 2019-01-23 DIAGNOSIS — E03.4 HYPOTHYROIDISM DUE TO ACQUIRED ATROPHY OF THYROID: ICD-10-CM

## 2019-01-23 PROCEDURE — 36415 COLL VENOUS BLD VENIPUNCTURE: CPT | Performed by: INTERNAL MEDICINE

## 2019-01-24 ENCOUNTER — TELEPHONE (OUTPATIENT)
Dept: MEDICAL GROUP | Facility: CLINIC | Age: 69
End: 2019-01-24

## 2019-01-24 DIAGNOSIS — E03.8 OTHER SPECIFIED HYPOTHYROIDISM: ICD-10-CM

## 2019-01-24 RX ORDER — LEVOTHYROXINE SODIUM 0.15 MG/1
150 TABLET ORAL
Qty: 30 TAB | Refills: 5 | Status: SHIPPED | OUTPATIENT
Start: 2019-01-24 | End: 2019-01-25 | Stop reason: SDUPTHER

## 2019-01-24 RX ORDER — LEVOTHYROXINE SODIUM 13 UG/1
150 CAPSULE ORAL
Qty: 30 CAP | Refills: 5 | Status: SHIPPED | OUTPATIENT
Start: 2019-01-24 | End: 2019-03-07 | Stop reason: SDUPTHER

## 2019-01-24 NOTE — TELEPHONE ENCOUNTER
Please inform patient that her TSH came back at 9.56 has greatly increased from 0.88 in 2017.  I have sent a prescription for levothyroxine 150 MCG's daily to [facility].  Patient will need to repeat thyroid function panel in 2-3 months.

## 2019-01-25 DIAGNOSIS — E03.8 OTHER SPECIFIED HYPOTHYROIDISM: ICD-10-CM

## 2019-01-25 NOTE — TELEPHONE ENCOUNTER
Ashley,  Since Dr. West is on vacation & unavailable, would you mind resubmitting this refill. It was supposed to go to [facility], instead of [facility].    Thank you!            Was the patient seen in the last year in this department? Yes    Does patient have an active prescription for medications requested? Yes    Received Request Via: Patient       Requested Prescriptions     Pending Prescriptions Disp Refills   • levothyroxine (SYNTHROID) 150 MCG Tab 90 Tab 5     Sig: Take 1 Tab by mouth Every morning on an empty stomach.

## 2019-01-28 RX ORDER — LEVOTHYROXINE SODIUM 0.15 MG/1
150 TABLET ORAL
Qty: 90 TAB | Refills: 0 | Status: SHIPPED | OUTPATIENT
Start: 2019-01-28 | End: 2019-02-12 | Stop reason: SDUPTHER

## 2019-02-12 DIAGNOSIS — E04.2 MULTIPLE THYROID NODULES: ICD-10-CM

## 2019-02-12 DIAGNOSIS — E03.4 HYPOTHYROIDISM DUE TO ACQUIRED ATROPHY OF THYROID: ICD-10-CM

## 2019-02-12 DIAGNOSIS — E03.8 OTHER SPECIFIED HYPOTHYROIDISM: ICD-10-CM

## 2019-02-12 RX ORDER — LEVOTHYROXINE SODIUM 0.15 MG/1
150 TABLET ORAL
Qty: 90 TAB | Refills: 0 | Status: SHIPPED | OUTPATIENT
Start: 2019-02-12 | End: 2019-04-08 | Stop reason: SDUPTHER

## 2019-02-13 ENCOUNTER — TELEPHONE (OUTPATIENT)
Dept: MEDICAL GROUP | Facility: CLINIC | Age: 69
End: 2019-02-13

## 2019-02-13 DIAGNOSIS — R07.89 SENSATION OF CHEST PRESSURE: ICD-10-CM

## 2019-02-13 DIAGNOSIS — I27.20 PULMONARY HYPERTENSION (HCC): ICD-10-CM

## 2019-02-14 ENCOUNTER — TELEPHONE (OUTPATIENT)
Dept: MEDICAL GROUP | Facility: CLINIC | Age: 69
End: 2019-02-14

## 2019-02-14 NOTE — TELEPHONE ENCOUNTER
[facility] called and notified us that the VIRT-GILLIAN 2.5-25-1 MG Tab is no longer available.  They are substituting this vitamin with Folbee.

## 2019-03-07 DIAGNOSIS — E03.8 OTHER SPECIFIED HYPOTHYROIDISM: ICD-10-CM

## 2019-03-07 DIAGNOSIS — E03.4 HYPOTHYROIDISM DUE TO ACQUIRED ATROPHY OF THYROID: ICD-10-CM

## 2019-03-07 RX ORDER — LEVOTHYROXINE SODIUM 13 UG/1
150 CAPSULE ORAL
Qty: 90 CAP | Refills: 1 | Status: SHIPPED | OUTPATIENT
Start: 2019-03-07 | End: 2019-04-08

## 2019-03-26 ENCOUNTER — TELEPHONE (OUTPATIENT)
Dept: MEDICAL GROUP | Facility: CLINIC | Age: 69
End: 2019-03-26

## 2019-03-26 NOTE — TELEPHONE ENCOUNTER
Pt needs a referral placed for Dr. Julio Moscoso  For orthopedic surgery in [facility]. 3/26/19

## 2019-03-27 DIAGNOSIS — R07.9 CHEST PAIN IN ADULT: ICD-10-CM

## 2019-03-28 DIAGNOSIS — M19.011 ARTHRITIS OF RIGHT SHOULDER REGION: ICD-10-CM

## 2019-03-28 NOTE — TELEPHONE ENCOUNTER
(R) shoulder and upper arm pain. Pt has received an injection that has worn off now and needs another visit with this provider.

## 2019-04-08 DIAGNOSIS — E03.8 OTHER SPECIFIED HYPOTHYROIDISM: ICD-10-CM

## 2019-04-08 RX ORDER — LEVOTHYROXINE SODIUM 0.15 MG/1
150 TABLET ORAL
Qty: 90 TAB | Refills: 1 | Status: SHIPPED | OUTPATIENT
Start: 2019-04-08 | End: 2019-07-03 | Stop reason: SDUPTHER

## 2019-04-12 DIAGNOSIS — Z79.899 MEDICATION MANAGEMENT: ICD-10-CM

## 2019-04-12 DIAGNOSIS — F41.9 ANXIETY: ICD-10-CM

## 2019-04-12 RX ORDER — LEVOTHYROXINE SODIUM 137 UG/1
137 TABLET ORAL
COMMUNITY
Start: 2017-12-21 | End: 2019-05-15

## 2019-04-12 RX ORDER — TADALAFIL 20 MG/1
TABLET ORAL
COMMUNITY
Start: 2019-02-06

## 2019-04-12 RX ORDER — ALPRAZOLAM 0.5 MG/1
TABLET ORAL
COMMUNITY
Start: 2019-01-28 | End: 2019-05-15

## 2019-04-12 NOTE — TELEPHONE ENCOUNTER
Was the patient seen in the last year in this department? Yes    Does patient have an active prescription for medications requested? Yes    Received Request Via: Patient       Requested Prescriptions     Pending Prescriptions Disp Refills   • Folic Acid-Vit B6-Vit B12 (FOLBEE) 2.5-25-1 MG Tab 30 Tab      Sig: Take 1 tablet by mouth.   • ALPRAZolam (XANAX) 0.5 MG Tab 90 Tab 1     Sig: Take 1 Tab by mouth at bedtime as needed for Sleep for up to 180 days.   • levothyroxine (SYNTHROID) 137 MCG Tab 90 Tab 2     Sig: Take 1 Tab by mouth Every morning on an empty stomach.

## 2019-04-16 RX ORDER — LEVOTHYROXINE SODIUM 137 UG/1
137 TABLET ORAL
Qty: 90 TAB | Refills: 2 | Status: SHIPPED | OUTPATIENT
Start: 2019-04-16 | End: 2019-05-15

## 2019-04-16 RX ORDER — ALPRAZOLAM 0.5 MG/1
0.5 TABLET ORAL NIGHTLY PRN
Qty: 90 TAB | Refills: 1 | OUTPATIENT
Start: 2019-04-16 | End: 2019-05-15

## 2019-04-17 ENCOUNTER — TELEPHONE (OUTPATIENT)
Dept: MEDICAL GROUP | Facility: CLINIC | Age: 69
End: 2019-04-17

## 2019-04-17 NOTE — TELEPHONE ENCOUNTER
Please inform patient that labs that were ordered from Dr. Yarbrough are as follows.  CMP within normal limits.  Slightly decreased hemoglobin level of 10.6, down from 12.1.  Sed rate and CRP are within normal limits.  Of note, on last visit, patient's Synthroid was increased to 150 MCG's due to TSH of 9.4.  Please have patient complete follow-up TSH level ( orders in EPIC)  and appointment with me.

## 2019-04-18 DIAGNOSIS — F41.9 ANXIETY: ICD-10-CM

## 2019-04-18 RX ORDER — ALPRAZOLAM 0.5 MG/1
0.5 TABLET ORAL NIGHTLY PRN
Qty: 30 TAB | Refills: 2 | OUTPATIENT
Start: 2019-04-18 | End: 2019-07-11 | Stop reason: SDUPTHER

## 2019-04-18 NOTE — TELEPHONE ENCOUNTER
Was the patient seen in the last year in this department? Yes    Does patient have an active prescription for medications requested? Yes    Received Request Via: Pharmacy     Requested Prescriptions     Pending Prescriptions Disp Refills   • ALPRAZolam (XANAX) 0.5 MG Tab 90 Tab 3     Sig: Take 1 Tab by mouth.

## 2019-04-24 ENCOUNTER — TELEMEDICINE2 (OUTPATIENT)
Dept: CARDIOLOGY | Facility: MEDICAL CENTER | Age: 69
End: 2019-04-24
Payer: COMMERCIAL

## 2019-04-24 ENCOUNTER — TELEMEDICINE ORIGINATING SITE VISIT (OUTPATIENT)
Dept: MEDICAL GROUP | Facility: CLINIC | Age: 69
End: 2019-04-24
Payer: COMMERCIAL

## 2019-04-24 ENCOUNTER — TELEPHONE (OUTPATIENT)
Dept: MEDICAL GROUP | Facility: CLINIC | Age: 69
End: 2019-04-24

## 2019-04-24 VITALS
OXYGEN SATURATION: 93 % | HEART RATE: 80 BPM | SYSTOLIC BLOOD PRESSURE: 142 MMHG | BODY MASS INDEX: 26.2 KG/M2 | WEIGHT: 163 LBS | DIASTOLIC BLOOD PRESSURE: 81 MMHG | HEIGHT: 66 IN

## 2019-04-24 DIAGNOSIS — R00.2 PALPITATIONS: ICD-10-CM

## 2019-04-24 DIAGNOSIS — Z79.01 ANTICOAGULATED: ICD-10-CM

## 2019-04-24 DIAGNOSIS — Z86.718 HISTORY OF DEEP VENOUS THROMBOSIS: ICD-10-CM

## 2019-04-24 DIAGNOSIS — I27.21 PULMONARY ARTERIAL HYPERTENSION (HCC): ICD-10-CM

## 2019-04-24 DIAGNOSIS — Z86.711 HISTORY OF PULMONARY EMBOLISM: ICD-10-CM

## 2019-04-24 DIAGNOSIS — I47.10 SVT (SUPRAVENTRICULAR TACHYCARDIA): ICD-10-CM

## 2019-04-24 DIAGNOSIS — J96.11 CHRONIC RESPIRATORY FAILURE WITH HYPOXIA (HCC): ICD-10-CM

## 2019-04-24 PROCEDURE — 99214 OFFICE O/P EST MOD 30 MIN: CPT | Performed by: INTERNAL MEDICINE

## 2019-04-24 ASSESSMENT — ENCOUNTER SYMPTOMS
INSOMNIA: 0
PALPITATIONS: 1
PND: 0
SHORTNESS OF BREATH: 1
MYALGIAS: 0
ABDOMINAL PAIN: 0
CHILLS: 0
LOSS OF CONSCIOUSNESS: 0
ORTHOPNEA: 0
FEVER: 0
BLURRED VISION: 0
DIZZINESS: 0

## 2019-04-24 NOTE — TELEPHONE ENCOUNTER
Pt's orthopedic surgeon from [facility] is leaving and would like a recommendation of a new surgeon if you have one.

## 2019-04-25 NOTE — PROGRESS NOTES
Chief Complaint   Patient presents with   •  Palpitation       Subjective:   Kaylynn Polo is a 68 y.o. female who presents today referred by PCP Dr. Cynthia West for cardiology consultation for evaluation of palpitations and an abnormal recorder.    The patient has significant past medical history of pulmonary arterial hypertension diagnosed in 2013 followed at [facility] every 3 months, right lower extremity DVT and pulmonary embolus in 2013 shortly after her initial visit at 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  She got a Ziopatch ordered by her [facility] physicians which showed nonsustained supraventricular tachycardia.    The patient reports that her palpitations occur at rest which she may have episodes of to a day or 2 times a week lasting at most 10 minutes.  No lightheadedness, dizziness or syncope.  She has been on Cardizem for hypertension started in 2015 by her [place] PCP.  She has been on chronic digoxin therapy since 2015 by her [facility] physicians for her pulmonary arterial hypertension.    In addition to her multidrug medical regiment for her pulmonary arterial hypertension she was also on a study drug since 2016 recently discontinued 1 week ago after the the drug was stopped being manufactured for unclear reasons.    The patient has no other history of heart disease.  No history of coronary artery disease, angina pectoris, myocardial infarction.  No history of diabetes mellitus or hyperlipidemia is never smoked cigarettes.    Past Medical History:   Diagnosis Date   • Abdominal discomfort, generalized 8/23/2017   • Acute bronchitis due to Haemophilus influenzae 4/5/2017   • Anemia 6/13/2016   • Chronic right shoulder pain 2/27/2018   •  Dysphagia 7/27/2016   • Gastroesophageal reflux disease without esophagitis 2/27/2018   • Hyperglycemia 5/3/2017   • Hypertension 6/13/2016   • Hypothyroidism due to acquired atrophy of thyroid 6/13/2016   • Iron (Fe) deficiency anemia 6/13/2016   • Low hemoglobin 10/17/2018   • Lumbar discogenic pain syndrome 7/27/2016   • Medication management 12/28/2017   • Multiple thyroid nodules 6/13/2016   • Neuropathy (HCC) 6/13/2016   • Pulmonary embolism (HCC) 6/13/2016   • Pulmonary hypertension (HCC) 6/13/2016   • Renal insufficiency 10/17/2018   • Rheumatoid arthritis (HCC) 6/13/2016   • SOB (shortness of breath) 4/5/2017   • Spinal stenosis of lumbar region with neurogenic claudication 12/28/2017   • Vitamin B12 deficiency 6/13/2016     Past Surgical History:   Procedure Laterality Date   • PARATHYROIDECTOMY  2004   • SHOULDER ARTHROSCOPY  2003   • BLADDER SLING FEMALE  2002   • HYSTERECTOMY, TOTAL ABDOMINAL       Family History   Problem Relation Age of Onset   • Alcohol/Drug Father    • Heart Disease Father    • Heart Disease Mother    • Thyroid Mother    • Diabetes Mother      Social History     Social History   • Marital status:      Spouse name: N/A   • Number of children: N/A   • Years of education: N/A     Occupational History   • Not on file.     Social History Main Topics   • Smoking status: Never Smoker   • Smokeless tobacco: Never Used   • Alcohol use No   • Drug use: No   • Sexual activity: Not on file     Other Topics Concern   • Not on file     Social History Narrative   • No narrative on file     Allergies   Allergen Reactions   • Latex Anaphylaxis     Blisters on skin   • Tape      Blisters on skin   • Warfarin Sodium Hives     Outpatient Encounter Prescriptions as of 4/24/2019   Medication Sig Dispense Refill   • Macitentan (OPSUMIT) 10 MG Tab Take  by mouth.     • Folic Acid-Vit B6-Vit B12 (FOLBEE) 2.5-25-1 MG Tab Take 1 tablet by mouth every day. 90 Tab 1   • levothyroxine (SYNTHROID) 150 MCG  Tab Take 1 Tab by mouth Every morning on an empty stomach. 90 Tab 1   • rivaroxaban (XARELTO) 15 MG Tab tablet Take 1 Tab by mouth with dinner. 90 Tab 2   • MAGNESIUM-ZINC PO Take  by mouth.     • temazepam (RESTORIL) 30 MG capsule Take 30 mg by mouth.     • Ferrous Sulfate (IRON) 325 (65 Fe) MG Tab Take 1 Tab by mouth every day. 30 Tab 2   • FLUARIX QUADRIVALENT 0.5 ML Suspension Prefilled Syringe injection TO BE ADMINISTERED BY THE PHARMACIST FOR IMMUNIZATION  0   • gabapentin (NEURONTIN) 800 MG tablet      • potassium chloride ER (KLOR-CON) 10 MEQ tablet      • NON SPECIFIED Rx continuous O2 at 2L n/c   New O2 concentrator  Lowest O2 sat 82% 1 Device 6   • Tadalafil, PAH, 20 MG Tab Take 2 Tabs by mouth every day.     • Cholecalciferol (VITAMIN D3) 1000 UNITS Cap Take 1 Cap by mouth every day.     • digoxin (LANOXIN) 125 MCG Tab Take 125 mcg by mouth every day.     • diltiazem (CARDIZEM) 30 MG Tab Take 30 mg by mouth 4 times a day.     • furosemide (LASIX) 40 MG Tab Take 40 mg by mouth 3 times a day.     • hydroxychloroquine (PLAQUENIL) 200 MG Tab Take 200 mg by mouth 2 times a day.     • leflunomide (ARAVA) 20 MG Tab Take 20 mg by mouth every day.     • pantoprazole (PROTONIX) 40 MG Tablet Delayed Response Take 40 mg by mouth 2 times a day.     • potassium chloride SA (K-DUR) 20 MEQ Tab CR Take 20 mEq by mouth 3 times a day.     • Ascorbic Acid (VITAMIN C) 1000 MG Tab Take 1 Tab by mouth every day.     • Treprostinil (TYVASO INH) Inhale 1.74 mg by mouth. Inhale 12 puffs orally four times daily   Indications: 1.74mg/2.9ml     • ALPRAZolam (XANAX) 0.5 MG Tab Take 1 Tab by mouth at bedtime as needed for Sleep for up to 90 days. (Patient not taking: Reported on 4/24/2019) 30 Tab 2   • ALPRAZolam (XANAX) 0.5 MG Tab Take 1 Tab by mouth at bedtime as needed for Sleep for up to 180 days. (Patient not taking: Reported on 4/24/2019) 90 Tab 1   • levothyroxine (SYNTHROID) 137 MCG Tab Take 1 Tab by mouth Every morning on an  empty stomach. (Patient not taking: Reported on 4/24/2019) 90 Tab 2   • ALPRAZolam (XANAX) 0.5 MG Tab      • tadalafil (CIALIS) 20 MG tablet      • levothyroxine (SYNTHROID) 137 MCG Tab Take 137 mcg by mouth.     • Cyanocobalamin (VITAMIN B-12) 1000 MCG Tab Take 1,000 mcg by mouth.     • digoxin (LANOXIN) 125 MCG Tab TAKE 1 TABLET BY MOUTH  DAILY.     • DILTIAZem (CARDIZEM) 30 MG Tab Take 1 tablet by mouth 4  times daily.     • Cholecalciferol (VITAMIN D3) 1000 units Cap Take 1,000 Units by mouth.     • Ascorbic Acid (VITAMIN C) 1000 MG Tab Take 1,000 mg by mouth.     • CALCIUM PO Take 500 mg by mouth.     • [DISCONTINUED] albuterol 108 (90 Base) MCG/ACT Aero Soln inhalation aerosol 2 Puffs by Nebulization route.     • VIRT-GILLIAN 2.5-25-1 MG Tab TAKE 1 TABLET BY MOUTH  EVERY DAY (Patient not taking: Reported on 4/24/2019) 90 Tab 1   • betamethasone dipropionate (DIPROLENE) 0.05 % Cream Apply topically as needed     • Dermatological Products, Misc. (EPICERAM) Emulsion Apply  to affected area(s).     • FERROUS SULFATE PO Take 325 mg by mouth.     • budesonide (RINOCORT AQUA) 32 MCG/ACT nasal spray Spray 2 Sprays in nose every day. 1 Bottle 2   • amoxicillin-clavulanate (AUGMENTIN) 875-125 MG Tab Take 1 Tab by mouth 2 times a day. (Patient not taking: Reported on 4/24/2019) 20 Tab 0   • sulfamethoxazole-trimethoprim (BACTRIM DS) 800-160 MG tablet Take 1 Tab by mouth 2 times a day. (Patient not taking: Reported on 4/24/2019) 20 Tab 0   • phenazopyridine (PYRIDIUM) 200 MG Tab Take 1 Tab by mouth 3 times a day as needed. (Patient not taking: Reported on 4/24/2019) 6 Tab 0   • XARELTO 15 MG Tab tablet Take 1 Tab by mouth with dinner. (Patient not taking: Reported on 4/24/2019) 90 Tab 3   • levothyroxine (SYNTHROID) 137 MCG Tab TAKE 1 TABLET BY MOUTH  EVERY MORNING ON AN EMPTY  STOMACH (Patient not taking: Reported on 4/24/2019) 90 Tab 2   • VIRT-GILLIAN 2.5-25-1 MG Tab Take 2.5 mg by mouth every day. (Patient not taking:  Reported on 4/24/2019) 30 Tab 11   • oxyCODONE immediate-release (ROXICODONE) 5 MG Tab      • fluticasone (FLONASE) 50 MCG/ACT nasal spray Spray 1 Spray in nose every day. 16 g 6   • sulfaSALAzine (AZULFIDINE) 500 MG Tab Take 200 mg by mouth 4 times a day.     • amoxicillin (AMOXIL) 875 MG tablet Take 1 Tab by mouth 2 times a day. (Patient not taking: Reported on 4/24/2019) 14 Tab 0   • amoxicillin (AMOXIL) 875 MG tablet Take 1 Tab by mouth 2 times a day. (Patient not taking: Reported on 4/24/2019) 14 Tab 0   • [DISCONTINUED] albuterol 108 (90 BASE) MCG/ACT Aero Soln inhalation aerosol Inhale 2 Puffs by mouth every 6 hours as needed for Shortness of Breath. (Patient not taking: Reported on 4/24/2019) 8.5 g 1   • triamcinolone acetonide (KENALOG) 0.1 % Cream      • gabapentin (NEURONTIN) 600 MG tablet      • ANORO ELLIPTA 62.5-25 MCG/INH AEROSOL POWDER, BREATH ACTIVATED      • NON SPECIFIED Rx continuous O2 at 2L n/c  New O2 concentrator  Lowest O2 sat 82%  SOB without use of O2 (Patient not taking: Reported on 4/24/2019) 1 Each 11   • calcium carbonate (OS-LUIS ARMANDO 500) 500 MG Tab Take 1,000 mg by mouth 2 times a day, with meals.     • folic acid (FOLVITE) 1 MG Tab Take 1 mg by mouth every day.     • Macitentan 10 MG Tab Take 1 Tab by mouth every day.     • tramadol (ULTRAM) 50 MG Tab Take 50 mg by mouth every four hours as needed.     • Cyanocobalamin (VITAMIN B-12) 1000 MCG Tab Take 1,000 mcg by mouth every day.     • rivaroxaban (XARELTO) 20 MG Tab tablet Take 15 mg by mouth with dinner.       No facility-administered encounter medications on file as of 4/24/2019.      Review of Systems   Constitutional: Negative for chills and fever.   HENT: Negative for congestion.    Eyes: Negative for blurred vision.   Respiratory: Positive for shortness of breath.    Cardiovascular: Positive for palpitations. Negative for chest pain, orthopnea, leg swelling and PND.   Gastrointestinal: Negative for abdominal pain.  "  Genitourinary: Negative for dysuria.   Musculoskeletal: Negative for joint pain and myalgias.   Skin: Negative for rash.   Neurological: Negative for dizziness and loss of consciousness.   Psychiatric/Behavioral: The patient does not have insomnia.         Objective:   /81 (BP Location: Left arm, Patient Position: Sitting)   Pulse 80   Ht 1.676 m (5' 6\")   Wt 73.9 kg (163 lb)   SpO2 93%   BMI 26.31 kg/m²     Physical Exam   Constitutional: She is oriented to person, place, and time. She appears well-developed and well-nourished.   On nasal cannula.   Eyes: Pupils are equal, round, and reactive to light. Conjunctivae are normal.   Neck: Normal range of motion. Neck supple.   Cardiovascular: Normal rate, regular rhythm, normal heart sounds and intact distal pulses.    Pulmonary/Chest: Effort normal and breath sounds normal. No accessory muscle usage. No respiratory distress. She has no wheezes. She has no rales.   Musculoskeletal: She exhibits no edema.   Neurological: She is alert and oriented to person, place, and time.   Skin: Skin is intact. No rash noted. No cyanosis. Nails show no clubbing.   Psychiatric: She has a normal mood and affect. Her behavior is normal.     EKG 01/16/2008 Normal sinus rhythm, rate 74.  Right bundle branch block.  Reviewed by myself.    Cardiac event recorder shows paroxysmal nonsustained supraventricular tachycardia and short runs of nonsustained ventricular tachycardia.    Assessment:     1. Palpitations     2. SVT (supraventricular tachycardia) (HCC)     3. Pulmonary arterial hypertension (HCC)     4. History of deep venous thrombosis     5. History of pulmonary embolism     6. Chronic respiratory failure with hypoxia (HCC)     7. Anticoagulated         Medical Decision Making:  Today's Assessment / Status / Plan:     Assessment  1.  Palpitations secondary #2.  2.  Paroxysmal atrial fibrillation.  3.  Pulmonary arterial hypertension, probably multifactorial secondary to " pulmonary emboli and rheumatoid related lung disease.  4.  Chronic respiratory failure on chronic oxygen therapy.  5.  Chronic anticoagulation, Xarelto.  6.  Nonsustained ventricular tachycardia.    Recommendation Discussion  1.  The patient was recently discontinued on a study medication for pulmonary arterial hypertension the past week which may have been a precipitating factor causing her palpitations.  2.  Will have her monitor her symptoms and if she has frequent recurrent palpitations will consider switching Cardizem to metoprolol or initiate antiarrhythmic therapy.  3.  She will keep in contact through phone calls or my chart.  4.  Follow-up 4 months.

## 2019-05-01 ENCOUNTER — TELEMEDICINE ORIGINATING SITE VISIT (OUTPATIENT)
Dept: MEDICAL GROUP | Facility: CLINIC | Age: 69
End: 2019-05-01
Payer: COMMERCIAL

## 2019-05-01 ENCOUNTER — TELEMEDICINE2 (OUTPATIENT)
Dept: MEDICAL GROUP | Age: 69
End: 2019-05-01
Payer: COMMERCIAL

## 2019-05-01 VITALS
HEIGHT: 66 IN | HEART RATE: 86 BPM | SYSTOLIC BLOOD PRESSURE: 111 MMHG | BODY MASS INDEX: 25.07 KG/M2 | OXYGEN SATURATION: 87 % | WEIGHT: 156 LBS | DIASTOLIC BLOOD PRESSURE: 66 MMHG | TEMPERATURE: 98.5 F

## 2019-05-01 DIAGNOSIS — R06.02 SOB (SHORTNESS OF BREATH): ICD-10-CM

## 2019-05-01 DIAGNOSIS — J01.00 ACUTE MAXILLARY SINUSITIS, RECURRENCE NOT SPECIFIED: ICD-10-CM

## 2019-05-01 DIAGNOSIS — J20.9 ACUTE BRONCHITIS DUE TO INFECTION: ICD-10-CM

## 2019-05-01 DIAGNOSIS — M19.011 PRIMARY OSTEOARTHRITIS OF RIGHT SHOULDER: ICD-10-CM

## 2019-05-01 PROCEDURE — 99214 OFFICE O/P EST MOD 30 MIN: CPT | Performed by: INTERNAL MEDICINE

## 2019-05-01 RX ORDER — ALBUTEROL SULFATE 90 UG/1
2 AEROSOL, METERED RESPIRATORY (INHALATION) EVERY 6 HOURS PRN
Qty: 8.5 G | Refills: 1 | Status: SHIPPED | OUTPATIENT
Start: 2019-05-01

## 2019-05-01 RX ORDER — AMOXICILLIN AND CLAVULANATE POTASSIUM 875; 125 MG/1; MG/1
1 TABLET, FILM COATED ORAL 2 TIMES DAILY
Qty: 20 TAB | Refills: 0 | Status: SHIPPED | OUTPATIENT
Start: 2019-05-01 | End: 2019-05-15

## 2019-05-03 NOTE — PROGRESS NOTES
CC: Respiratory infection    Verified identification with patient. Secured video conference with RN presenter in [facility]      HPI:   Kaylynn presents today with the following.    1. Acute bronchitis due to infection  Patient presents with a 2-day history of mildly productive cough, nasal discharge, headache facial pain shortness of breath sore throat fevers.  Using over-the-counter Mucinex.  Symptoms not improving and are worsening.  No history of allergies.  Patient has increased her oxygen use to 3 L nasal cannula to help with symptoms.  Patient recently was on a plane traveling from [place] and was exposed to somebody with a respiratory infection during the flight.  Patient has an appointment with her pulmonologist coming Monday.    2. Primary osteoarthritis of right shoulder  Patient requesting a referral to orthopedics in [place] as she is unable to see her orthopedist at [facility]      Patient Active Problem List    Diagnosis Date Noted   • Pulmonary arterial hypertension (HCC) 04/24/2019   • History of deep venous thrombosis 04/24/2019   • History of pulmonary embolism 04/24/2019   • Chronic respiratory failure with hypoxia ([facility]) 04/24/2019   • Anticoagulation adequate 04/24/2019   • SVT (supraventricular tachycardia) ([facility]) 04/24/2019   • Palpitations 04/24/2019   • Anticoagulated 04/24/2019   • Low hemoglobin 10/17/2018   • Renal insufficiency 10/17/2018   • Chronic right shoulder pain 02/27/2018   • Gastroesophageal reflux disease without esophagitis 02/27/2018   • Spinal stenosis of lumbar region with neurogenic claudication 12/28/2017   • Medication management 12/28/2017   • Abdominal discomfort, generalized 08/23/2017   • Hyperglycemia 05/03/2017   • SOB (shortness of breath) 04/05/2017   • Acute bronchitis due to Haemophilus influenzae 04/05/2017   • Lumbar discogenic pain syndrome 07/27/2016   • Dysphagia 07/27/2016   • Pulmonary hypertension (HCC) 06/13/2016   • Rheumatoid arthritis involving multiple sites  with positive rheumatoid factor ([facility]) 06/13/2016   • Hypertension 06/13/2016   • Neuropathy ([facility]) 06/13/2016   • Iron (Fe) deficiency anemia 06/13/2016   • Vitamin B12 deficiency 06/13/2016   • Hypothyroidism due to acquired atrophy of thyroid 06/13/2016   • Pulmonary embolism ([facility]) 06/13/2016   • Multiple thyroid nodules 06/13/2016       Current Outpatient Prescriptions   Medication Sig Dispense Refill   • albuterol 108 (90 Base) MCG/ACT Aero Soln inhalation aerosol Inhale 2 Puffs by mouth every 6 hours as needed for Shortness of Breath. 8.5 g 1   • amoxicillin-clavulanate (AUGMENTIN) 875-125 MG Tab Take 1 Tab by mouth 2 times a day. 20 Tab 0   • Folic Acid-Vit B6-Vit B12 (FOLBEE) 2.5-25-1 MG Tab Take 1 tablet by mouth every day. 90 Tab 1   • ALPRAZolam (XANAX) 0.5 MG Tab Take 1 Tab by mouth at bedtime as needed for Sleep for up to 180 days. 90 Tab 1   • levothyroxine (SYNTHROID) 150 MCG Tab Take 1 Tab by mouth Every morning on an empty stomach. 90 Tab 1   • rivaroxaban (XARELTO) 15 MG Tab tablet Take 1 Tab by mouth with dinner. 90 Tab 2   • budesonide (RINOCORT AQUA) 32 MCG/ACT nasal spray Spray 2 Sprays in nose every day. 1 Bottle 2   • sulfamethoxazole-trimethoprim (BACTRIM DS) 800-160 MG tablet Take 1 Tab by mouth 2 times a day. 20 Tab 0   • phenazopyridine (PYRIDIUM) 200 MG Tab Take 1 Tab by mouth 3 times a day as needed. 6 Tab 0   • XARELTO 15 MG Tab tablet Take 1 Tab by mouth with dinner. 90 Tab 3   • VIRT-GILLIAN 2.5-25-1 MG Tab Take 2.5 mg by mouth every day. 30 Tab 11   • Ferrous Sulfate (IRON) 325 (65 Fe) MG Tab Take 1 Tab by mouth every day. 30 Tab 2   • fluticasone (FLONASE) 50 MCG/ACT nasal spray Spray 1 Spray in nose every day. 16 g 6   • Macitentan (OPSUMIT) 10 MG Tab Take  by mouth.     • ALPRAZolam (XANAX) 0.5 MG Tab Take 1 Tab by mouth at bedtime as needed for Sleep for up to 90 days. (Patient not taking: Reported on 4/24/2019) 30 Tab 2   • levothyroxine (SYNTHROID) 137 MCG Tab Take 1 Tab by mouth  Every morning on an empty stomach. (Patient not taking: Reported on 4/24/2019) 90 Tab 2   • ALPRAZolam (XANAX) 0.5 MG Tab      • tadalafil (CIALIS) 20 MG tablet      • levothyroxine (SYNTHROID) 137 MCG Tab Take 137 mcg by mouth.     • Cyanocobalamin (VITAMIN B-12) 1000 MCG Tab Take 1,000 mcg by mouth.     • digoxin (LANOXIN) 125 MCG Tab TAKE 1 TABLET BY MOUTH  DAILY.     • DILTIAZem (CARDIZEM) 30 MG Tab Take 1 tablet by mouth 4  times daily.     • Cholecalciferol (VITAMIN D3) 1000 units Cap Take 1,000 Units by mouth.     • Ascorbic Acid (VITAMIN C) 1000 MG Tab Take 1,000 mg by mouth.     • CALCIUM PO Take 500 mg by mouth.     • VIRT-GILLIAN 2.5-25-1 MG Tab TAKE 1 TABLET BY MOUTH  EVERY DAY (Patient not taking: Reported on 4/24/2019) 90 Tab 1   • betamethasone dipropionate (DIPROLENE) 0.05 % Cream Apply topically as needed     • Dermatological Products, Misc. (EPICERAM) Emulsion Apply  to affected area(s).     • FERROUS SULFATE PO Take 325 mg by mouth.     • MAGNESIUM-ZINC PO Take  by mouth.     • temazepam (RESTORIL) 30 MG capsule Take 30 mg by mouth.     • amoxicillin-clavulanate (AUGMENTIN) 875-125 MG Tab Take 1 Tab by mouth 2 times a day. (Patient not taking: Reported on 4/24/2019) 20 Tab 0   • levothyroxine (SYNTHROID) 137 MCG Tab TAKE 1 TABLET BY MOUTH  EVERY MORNING ON AN EMPTY  STOMACH (Patient not taking: Reported on 4/24/2019) 90 Tab 2   • oxyCODONE immediate-release (ROXICODONE) 5 MG Tab      • FLUARIX QUADRIVALENT 0.5 ML Suspension Prefilled Syringe injection TO BE ADMINISTERED BY THE PHARMACIST FOR IMMUNIZATION  0   • sulfaSALAzine (AZULFIDINE) 500 MG Tab Take 200 mg by mouth 4 times a day.     • gabapentin (NEURONTIN) 800 MG tablet      • amoxicillin (AMOXIL) 875 MG tablet Take 1 Tab by mouth 2 times a day. (Patient not taking: Reported on 4/24/2019) 14 Tab 0   • amoxicillin (AMOXIL) 875 MG tablet Take 1 Tab by mouth 2 times a day. (Patient not taking: Reported on 4/24/2019) 14 Tab 0   • potassium chloride  ER (KLOR-CON) 10 MEQ tablet      • triamcinolone acetonide (KENALOG) 0.1 % Cream      • gabapentin (NEURONTIN) 600 MG tablet      • ANORO ELLIPTA 62.5-25 MCG/INH AEROSOL POWDER, BREATH ACTIVATED      • NON SPECIFIED Rx continuous O2 at 2L n/c  New O2 concentrator  Lowest O2 sat 82%  SOB without use of O2 (Patient not taking: Reported on 4/24/2019) 1 Each 11   • NON SPECIFIED Rx continuous O2 at 2L n/c   New O2 concentrator  Lowest O2 sat 82% 1 Device 6   • Tadalafil, PAH, 20 MG Tab Take 2 Tabs by mouth every day.     • calcium carbonate (OS-LUIS ARMANDO 500) 500 MG Tab Take 1,000 mg by mouth 2 times a day, with meals.     • Cholecalciferol (VITAMIN D3) 1000 UNITS Cap Take 1 Cap by mouth every day.     • digoxin (LANOXIN) 125 MCG Tab Take 125 mcg by mouth every day.     • diltiazem (CARDIZEM) 30 MG Tab Take 30 mg by mouth 4 times a day.     • folic acid (FOLVITE) 1 MG Tab Take 1 mg by mouth every day.     • furosemide (LASIX) 40 MG Tab Take 40 mg by mouth 3 times a day.     • hydroxychloroquine (PLAQUENIL) 200 MG Tab Take 200 mg by mouth 2 times a day.     • leflunomide (ARAVA) 20 MG Tab Take 20 mg by mouth every day.     • Macitentan 10 MG Tab Take 1 Tab by mouth every day.     • pantoprazole (PROTONIX) 40 MG Tablet Delayed Response Take 40 mg by mouth 2 times a day.     • potassium chloride SA (K-DUR) 20 MEQ Tab CR Take 20 mEq by mouth 3 times a day.     • tramadol (ULTRAM) 50 MG Tab Take 50 mg by mouth every four hours as needed.     • Cyanocobalamin (VITAMIN B-12) 1000 MCG Tab Take 1,000 mcg by mouth every day.     • Ascorbic Acid (VITAMIN C) 1000 MG Tab Take 1 Tab by mouth every day.     • rivaroxaban (XARELTO) 20 MG Tab tablet Take 15 mg by mouth with dinner.     • Treprostinil (TYVASO INH) Inhale 1.74 mg by mouth. Inhale 12 puffs orally four times daily   Indications: 1.74mg/2.9ml       No current facility-administered medications for this visit.          Allergies as of 05/01/2019 - Reviewed 05/01/2019   Allergen  "Reaction Noted   • Erythrocin  05/01/2019   • Latex Anaphylaxis 06/13/2016   • Tape  06/13/2016   • Warfarin sodium Hives 06/13/2016        ROS: As per HPI.  Denies all other cardiopulmonary, GI, neurologic symptoms.    /66 (BP Location: Right arm, Patient Position: Sitting)   Pulse 86   Temp 36.9 °C (98.5 °F) (Temporal)   Ht 1.676 m (5' 6\")   Wt 70.8 kg (156 lb)   SpO2 (!) 87%   BMI 25.18 kg/m²     Physical Exam:  Gen:         Alert and oriented, mild  distress.  HEENT:    No Lymphadenopathy or Bruits.  Oropharynx with erythema, no exudate, positive PND.  Tenderness across maxillary sinus on the left side.  Tympanic membranes intact bilaterally.  PERRLA, EOMI.  Lungs:     Mild decreased air excursion, mild rhonchi scattered in upper airways.  CV:          Regular rate and rhythm. No murmurs, rubs or gallops.  Abd:         Soft non tender, non distended. Normal active bowel sounds.  No  Hepatosplenomegaly, No pulsatile masses.                   Ext:          No clubbing, cyanosis, edema.      Assessment and Plan.   68 y.o. female with the following issues.    1. Acute bronchitis due to infection  Treat with albuterol and Augmentin  Patient will be seen by her pulmonologist on Monday  Push fluids  ER precautions provided  Patient stable on discharge from clinic    - albuterol 108 (90 Base) MCG/ACT Aero Soln inhalation aerosol; Inhale 2 Puffs by mouth every 6 hours as needed for Shortness of Breath.  Dispense: 8.5 g; Refill: 1  - amoxicillin-clavulanate (AUGMENTIN) 875-125 MG Tab; Take 1 Tab by mouth 2 times a day.  Dispense: 20 Tab; Refill: 0    2. Acute maxillary sinusitis, recurrence not specified    - amoxicillin-clavulanate (AUGMENTIN) 875-125 MG Tab; Take 1 Tab by mouth 2 times a day.  Dispense: 20 Tab; Refill: 0    3. Primary osteoarthritis of right shoulder  Referral to orthopedics in [place]            Please note that this dictation was created using voice recognition software. I have made every " reasonable attempt to correct obvious errors, but expect that there are errors of grammar and possible content that I did not discover before finalizing note.

## 2019-05-15 ENCOUNTER — TELEMEDICINE2 (OUTPATIENT)
Dept: MEDICAL GROUP | Age: 69
End: 2019-05-15
Payer: COMMERCIAL

## 2019-05-15 ENCOUNTER — TELEMEDICINE ORIGINATING SITE VISIT (OUTPATIENT)
Dept: MEDICAL GROUP | Facility: CLINIC | Age: 69
End: 2019-05-15
Payer: COMMERCIAL

## 2019-05-15 VITALS
TEMPERATURE: 98.1 F | OXYGEN SATURATION: 90 % | HEIGHT: 66 IN | SYSTOLIC BLOOD PRESSURE: 177 MMHG | DIASTOLIC BLOOD PRESSURE: 76 MMHG | WEIGHT: 156 LBS | RESPIRATION RATE: 20 BRPM | BODY MASS INDEX: 25.07 KG/M2 | HEART RATE: 67 BPM

## 2019-05-15 DIAGNOSIS — L98.9 SKIN LESION OF BACK: ICD-10-CM

## 2019-05-15 DIAGNOSIS — J01.00 SUBACUTE MAXILLARY SINUSITIS: ICD-10-CM

## 2019-05-15 PROCEDURE — 99214 OFFICE O/P EST MOD 30 MIN: CPT | Performed by: INTERNAL MEDICINE

## 2019-05-15 RX ORDER — POTASSIUM CHLORIDE 1.5 G/1.58G
100 POWDER, FOR SOLUTION ORAL DAILY
COMMUNITY

## 2019-05-15 RX ORDER — MOXIFLOXACIN HYDROCHLORIDE 400 MG/1
400 TABLET ORAL DAILY
Qty: 14 TAB | Refills: 0 | Status: SHIPPED | OUTPATIENT
Start: 2019-05-15 | End: 2019-08-27

## 2019-05-16 NOTE — PROGRESS NOTES
CC: Follow-up sinus infection, skin lesion    Verified identification with patient. Secured video conference with RN presenter in [facility]      HPI:   Kaylynn presents today with the following.    1. Subacute maxillary sinusitis  Patient is here for follow-up appointment for bronchitis/sinusitis from last visit on May 1.  Patient completed a 10-day course of Augmentin but sinusitis has not resolved.  Patient is still experiencing maxillary sinus pain, upper teeth pain, postnasal drip, nasal congestion, nasal discharge.  Is coughing up mucus.  Patient feels only 50% improved.  Patient followed up with her pulmonologist within the last 2 weeks and medications were adjusted.    2. Skin lesion of back  Patient complains of a whitish skin lesion on her right upper back that has been there for the last year.  Complains of itchiness, no bleeding.  Discerned.      Patient Active Problem List    Diagnosis Date Noted   • Subacute maxillary sinusitis 05/15/2019   • Skin lesion of back 05/15/2019   • Pulmonary arterial hypertension (HCC) 04/24/2019   • History of deep venous thrombosis 04/24/2019   • History of pulmonary embolism 04/24/2019   • Chronic respiratory failure with hypoxia ([facility]) 04/24/2019   • Anticoagulation adequate 04/24/2019   • SVT (supraventricular tachycardia) ([facility]) 04/24/2019   • Palpitations 04/24/2019   • Anticoagulated 04/24/2019   • Low hemoglobin 10/17/2018   • Renal insufficiency 10/17/2018   • Chronic right shoulder pain 02/27/2018   • Gastroesophageal reflux disease without esophagitis 02/27/2018   • Spinal stenosis of lumbar region with neurogenic claudication 12/28/2017   • Medication management 12/28/2017   • Abdominal discomfort, generalized 08/23/2017   • Hyperglycemia 05/03/2017   • SOB (shortness of breath) 04/05/2017   • Acute bronchitis due to Haemophilus influenzae 04/05/2017   • Lumbar discogenic pain syndrome 07/27/2016   • Dysphagia 07/27/2016   • Pulmonary hypertension (HCC)  06/13/2016   • Rheumatoid arthritis involving multiple sites with positive rheumatoid factor (HCC) 06/13/2016   • Hypertension 06/13/2016   • Neuropathy (HCC) 06/13/2016   • Iron (Fe) deficiency anemia 06/13/2016   • Vitamin B12 deficiency 06/13/2016   • Hypothyroidism due to acquired atrophy of thyroid 06/13/2016   • Pulmonary embolism (HCC) 06/13/2016   • Multiple thyroid nodules 06/13/2016       Current Outpatient Prescriptions   Medication Sig Dispense Refill   • potassium chloride (KLOR-CON) 20 MEQ Pack Take 100 mEq by mouth 2 times a day.     • moxifloxacin (AVELOX) 400 MG Tab Take 1 Tab by mouth every day. 14 Tab 0   • albuterol 108 (90 Base) MCG/ACT Aero Soln inhalation aerosol Inhale 2 Puffs by mouth every 6 hours as needed for Shortness of Breath. 8.5 g 1   • Macitentan (OPSUMIT) 10 MG Tab Take  by mouth.     • ALPRAZolam (XANAX) 0.5 MG Tab Take 1 Tab by mouth at bedtime as needed for Sleep for up to 90 days. (Patient not taking: Reported on 4/24/2019) 30 Tab 2   • Folic Acid-Vit B6-Vit B12 (FOLBEE) 2.5-25-1 MG Tab Take 1 tablet by mouth every day. 90 Tab 1   • tadalafil (CIALIS) 20 MG tablet      • levothyroxine (SYNTHROID) 150 MCG Tab Take 1 Tab by mouth Every morning on an empty stomach. 90 Tab 1   • Cyanocobalamin (VITAMIN B-12) 1000 MCG Tab Take 1,000 mcg by mouth.     • digoxin (LANOXIN) 125 MCG Tab TAKE 1 TABLET BY MOUTH  DAILY.     • DILTIAZem (CARDIZEM) 30 MG Tab Take 1 tablet by mouth 4  times daily.     • Cholecalciferol (VITAMIN D3) 1000 units Cap Take 1,000 Units by mouth.     • Ascorbic Acid (VITAMIN C) 1000 MG Tab Take 1,000 mg by mouth.     • CALCIUM PO Take 500 mg by mouth.     • VIRT-GILLIAN 2.5-25-1 MG Tab TAKE 1 TABLET BY MOUTH  EVERY DAY (Patient not taking: Reported on 4/24/2019) 90 Tab 1   • rivaroxaban (XARELTO) 15 MG Tab tablet Take 1 Tab by mouth with dinner. 90 Tab 2   • betamethasone dipropionate (DIPROLENE) 0.05 % Cream Apply topically as needed     • Dermatological Products,  Misc. (EPICERAM) Emulsion Apply  to affected area(s).     • MAGNESIUM-ZINC PO Take  by mouth.     • temazepam (RESTORIL) 30 MG capsule Take 30 mg by mouth.     • budesonide (RINOCORT AQUA) 32 MCG/ACT nasal spray Spray 2 Sprays in nose every day. 1 Bottle 2   • sulfamethoxazole-trimethoprim (BACTRIM DS) 800-160 MG tablet Take 1 Tab by mouth 2 times a day. 20 Tab 0   • XARELTO 15 MG Tab tablet Take 1 Tab by mouth with dinner. 90 Tab 3   • Ferrous Sulfate (IRON) 325 (65 Fe) MG Tab Take 1 Tab by mouth every day. 30 Tab 2   • oxyCODONE immediate-release (ROXICODONE) 5 MG Tab      • fluticasone (FLONASE) 50 MCG/ACT nasal spray Spray 1 Spray in nose every day. 16 g 6   • sulfaSALAzine (AZULFIDINE) 500 MG Tab Take 200 mg by mouth 4 times a day.     • gabapentin (NEURONTIN) 800 MG tablet      • triamcinolone acetonide (KENALOG) 0.1 % Cream      • ANORO ELLIPTA 62.5-25 MCG/INH AEROSOL POWDER, BREATH ACTIVATED      • NON SPECIFIED Rx continuous O2 at 2L n/c  New O2 concentrator  Lowest O2 sat 82%  SOB without use of O2 (Patient not taking: Reported on 4/24/2019) 1 Each 11   • NON SPECIFIED Rx continuous O2 at 2L n/c   New O2 concentrator  Lowest O2 sat 82% 1 Device 6   • calcium carbonate (OS-LUIS ARMANDO 500) 500 MG Tab Take 1,000 mg by mouth 2 times a day, with meals.     • furosemide (LASIX) 40 MG Tab Take 40 mg by mouth 3 times a day.     • hydroxychloroquine (PLAQUENIL) 200 MG Tab Take 200 mg by mouth 2 times a day.     • leflunomide (ARAVA) 20 MG Tab Take 20 mg by mouth every day.     • pantoprazole (PROTONIX) 40 MG Tablet Delayed Response Take 40 mg by mouth 2 times a day.     • tramadol (ULTRAM) 50 MG Tab Take 50 mg by mouth every four hours as needed.     • Treprostinil (TYVASO INH) Inhale 1.74 mg by mouth. Inhale 12 puffs orally four times daily   Indications: 1.74mg/2.9ml       No current facility-administered medications for this visit.          Allergies as of 05/15/2019 - Reviewed 05/15/2019   Allergen Reaction Noted   •  "Latex Anaphylaxis and Rash 10/09/2013   • Erythrocin  05/01/2019   • Tape  06/13/2016   • Warfarin sodium Hives 06/13/2016   • Coumarin Hives and Rash 10/09/2013        ROS: As per HPI.  Denies all other cardiopulmonary, GI, neurologic symptoms.    BP (!) 177/76 (BP Location: Right arm, Patient Position: Sitting)   Pulse 67   Temp 36.7 °C (98.1 °F)   Resp 20   Ht 1.676 m (5' 6\")   Wt 70.8 kg (156 lb)   SpO2 90%   BMI 25.18 kg/m²     Physical Exam:  Gen:         Alert and oriented, No apparent distress.  Wearing oxygen.  HEENT:    No Lymphadenopathy or Bruits.  Oropharynx with erythema, no exudate, positive PND.  TTP to maxillary sinus bilaterally.  Tympanic membranes intact bilaterally.  PERRLA, EOMI.  Lungs:     Clear to auscultation bilaterally, no wheezes rhonchi or crackles noted.  CV:          Regular rate and rhythm. No murmurs, rubs or gallops.  Abd:         Soft non tender, non distended. Normal active bowel sounds.  No  Hepatosplenomegaly, No pulsatile masses.                   Ext:          No clubbing, cyanosis, edema.  Skin:        Whitish irregular bordered indented skin lesion noted on right upper back.  Measuring 7 to 8 mm in diameter.    Assessment and Plan.   68 y.o. female with the following issues.    1. Subacute maxillary sinusitis  Trial with Avelox for 14 days  Saline nasal spray, Flonase recommended  Continue Breanna pot    - moxifloxacin (AVELOX) 400 MG Tab; Take 1 Tab by mouth every day.  Dispense: 14 Tab; Refill: 0    2. Skin lesion of back    - REFERRAL TO DERMATOLOGY            Please note that this dictation was created using voice recognition software. I have made every reasonable attempt to correct obvious errors, but expect that there are errors of grammar and possible content that I did not discover before finalizing note.   "

## 2019-05-21 ENCOUNTER — NON-PROVIDER VISIT (OUTPATIENT)
Dept: MEDICAL GROUP | Facility: CLINIC | Age: 69
End: 2019-05-21
Payer: COMMERCIAL

## 2019-05-21 DIAGNOSIS — N30.91 CYSTITIS WITH HEMATURIA: ICD-10-CM

## 2019-05-21 DIAGNOSIS — N39.0 URINARY TRACT INFECTION WITHOUT HEMATURIA, SITE UNSPECIFIED: ICD-10-CM

## 2019-05-21 LAB
APPEARANCE UR: NORMAL
BILIRUB UR STRIP-MCNC: NORMAL MG/DL
COLOR UR AUTO: NORMAL
GLUCOSE UR STRIP.AUTO-MCNC: NORMAL MG/DL
KETONES UR STRIP.AUTO-MCNC: NORMAL MG/DL
LEUKOCYTE ESTERASE UR QL STRIP.AUTO: NORMAL
NITRITE UR QL STRIP.AUTO: POSITIVE
PH UR STRIP.AUTO: 5 [PH] (ref 5–8)
PROT UR QL STRIP: >=300 MG/DL
RBC UR QL AUTO: NORMAL
SP GR UR STRIP.AUTO: 1.02
UROBILINOGEN UR STRIP-MCNC: 2 MG/DL

## 2019-05-21 PROCEDURE — 81002 URINALYSIS NONAUTO W/O SCOPE: CPT | Performed by: INTERNAL MEDICINE

## 2019-05-21 RX ORDER — SULFAMETHOXAZOLE AND TRIMETHOPRIM 800; 160 MG/1; MG/1
1 TABLET ORAL 2 TIMES DAILY
Qty: 14 TAB | Refills: 0 | Status: SHIPPED | OUTPATIENT
Start: 2019-05-21 | End: 2019-05-28

## 2019-05-21 NOTE — NON-PROVIDER
Kaylynn Polo is a 68 y.o. female here for a non-provider visit for UA    If abnormal was an in office provider notified today (if so, indicate provider)? Yes  Routed to PCP? Yes

## 2019-05-29 ENCOUNTER — NON-PROVIDER VISIT (OUTPATIENT)
Dept: MEDICAL GROUP | Facility: CLINIC | Age: 69
End: 2019-05-29
Payer: COMMERCIAL

## 2019-05-29 ENCOUNTER — TELEMEDICINE2 (OUTPATIENT)
Dept: MEDICAL GROUP | Age: 69
End: 2019-05-29
Payer: COMMERCIAL

## 2019-05-29 ENCOUNTER — TELEMEDICINE ORIGINATING SITE VISIT (OUTPATIENT)
Dept: MEDICAL GROUP | Facility: CLINIC | Age: 69
End: 2019-05-29
Payer: COMMERCIAL

## 2019-05-29 VITALS
HEIGHT: 66 IN | SYSTOLIC BLOOD PRESSURE: 118 MMHG | WEIGHT: 153 LBS | TEMPERATURE: 98.2 F | HEART RATE: 104 BPM | BODY MASS INDEX: 24.59 KG/M2 | OXYGEN SATURATION: 80 % | DIASTOLIC BLOOD PRESSURE: 72 MMHG

## 2019-05-29 DIAGNOSIS — N39.0 RECURRENT UTI (URINARY TRACT INFECTION): ICD-10-CM

## 2019-05-29 DIAGNOSIS — R10.84 ABDOMINAL DISCOMFORT, GENERALIZED: ICD-10-CM

## 2019-05-29 DIAGNOSIS — R30.0 DYSURIA: ICD-10-CM

## 2019-05-29 LAB
APPEARANCE UR: CLEAR
BILIRUB UR STRIP-MCNC: NEGATIVE MG/DL
COLOR UR AUTO: YELLOW
GLUCOSE UR STRIP.AUTO-MCNC: NEGATIVE MG/DL
KETONES UR STRIP.AUTO-MCNC: NEGATIVE MG/DL
LEUKOCYTE ESTERASE UR QL STRIP.AUTO: NEGATIVE
NITRITE UR QL STRIP.AUTO: NEGATIVE
PH UR STRIP.AUTO: 5.5 [PH] (ref 5–8)
PROT UR QL STRIP: 30 MG/DL
RBC UR QL AUTO: NEGATIVE
SP GR UR STRIP.AUTO: 1.01
UROBILINOGEN UR STRIP-MCNC: 0.2 MG/DL

## 2019-05-29 PROCEDURE — 81002 URINALYSIS NONAUTO W/O SCOPE: CPT | Performed by: INTERNAL MEDICINE

## 2019-05-29 PROCEDURE — 99213 OFFICE O/P EST LOW 20 MIN: CPT | Performed by: INTERNAL MEDICINE

## 2019-06-02 NOTE — PROGRESS NOTES
CC: Follow-up UTI    Verified identification with patient. Secured video conference with RN presenter in [facility]      HPI:   Kaylynn presents today with the following.    1. Recurrent UTI (urinary tract infection)  Patient presented to the clinic last week with urinary tract infection symptoms which included increased urinary frequency and dysuria.  Urinalysis showed positive leukocyte esterase, positive nitrites.  Culture and sensitivity showed positive for E. coli infection sensitive to Augmentin, nitrofurantoin and Bactrim.  Patient completed course of Bactrim for 7 days and symptoms resolved.  Sensitivities show resistance to levofloxacin, ciprofloxacin.  Sensitivities are positive for Augmentin and ampicillin.  Follow-up urinalysis today is negative, infection resolved.  Patient is concerned that this is her fourth urinary tract infection since October.  Patient believes there may be a correlation of increased urinary tract infections with concurrent treatment with sulfasalazine which is currently dosed at 600 mg in the a.m., 400 mg in the p.m.  Patient has been on this medication for 1 year.  Sulfasalazine prescribed by her rheumatologist.      Patient Active Problem List    Diagnosis Date Noted   • Recurrent UTI (urinary tract infection) 05/29/2019   • Subacute maxillary sinusitis 05/15/2019   • Skin lesion of back 05/15/2019   • Pulmonary arterial hypertension (HCC) 04/24/2019   • History of deep venous thrombosis 04/24/2019   • History of pulmonary embolism 04/24/2019   • Chronic respiratory failure with hypoxia ([facility]) 04/24/2019   • Anticoagulation adequate 04/24/2019   • SVT (supraventricular tachycardia) ([facility]) 04/24/2019   • Palpitations 04/24/2019   • Anticoagulated 04/24/2019   • Low hemoglobin 10/17/2018   • Renal insufficiency 10/17/2018   • Chronic right shoulder pain 02/27/2018   • Gastroesophageal reflux disease without esophagitis 02/27/2018   • Spinal stenosis of lumbar region with neurogenic  claudication 12/28/2017   • Medication management 12/28/2017   • Abdominal discomfort, generalized 08/23/2017   • Hyperglycemia 05/03/2017   • SOB (shortness of breath) 04/05/2017   • Acute bronchitis due to Haemophilus influenzae 04/05/2017   • Lumbar discogenic pain syndrome 07/27/2016   • Dysphagia 07/27/2016   • Pulmonary hypertension (HCC) 06/13/2016   • Rheumatoid arthritis involving multiple sites with positive rheumatoid factor (HCC) 06/13/2016   • Hypertension 06/13/2016   • Neuropathy (HCC) 06/13/2016   • Iron (Fe) deficiency anemia 06/13/2016   • Vitamin B12 deficiency 06/13/2016   • Hypothyroidism due to acquired atrophy of thyroid 06/13/2016   • Pulmonary embolism (HCC) 06/13/2016   • Multiple thyroid nodules 06/13/2016       Current Outpatient Prescriptions   Medication Sig Dispense Refill   • albuterol 108 (90 Base) MCG/ACT Aero Soln inhalation aerosol Inhale 2 Puffs by mouth every 6 hours as needed for Shortness of Breath. 8.5 g 1   • ALPRAZolam (XANAX) 0.5 MG Tab Take 1 Tab by mouth at bedtime as needed for Sleep for up to 90 days. 30 Tab 2   • Folic Acid-Vit B6-Vit B12 (FOLBEE) 2.5-25-1 MG Tab Take 1 tablet by mouth every day. 90 Tab 1   • levothyroxine (SYNTHROID) 150 MCG Tab Take 1 Tab by mouth Every morning on an empty stomach. 90 Tab 1   • VIRT-GILLIAN 2.5-25-1 MG Tab TAKE 1 TABLET BY MOUTH  EVERY DAY 90 Tab 1   • rivaroxaban (XARELTO) 15 MG Tab tablet Take 1 Tab by mouth with dinner. 90 Tab 2   • budesonide (RINOCORT AQUA) 32 MCG/ACT nasal spray Spray 2 Sprays in nose every day. 1 Bottle 2   • XARELTO 15 MG Tab tablet Take 1 Tab by mouth with dinner. 90 Tab 3   • Ferrous Sulfate (IRON) 325 (65 Fe) MG Tab Take 1 Tab by mouth every day. 30 Tab 2   • fluticasone (FLONASE) 50 MCG/ACT nasal spray Spray 1 Spray in nose every day. 16 g 6   • NON SPECIFIED Rx continuous O2 at 2L n/c  New O2 concentrator  Lowest O2 sat 82%  SOB without use of O2 1 Each 11   • NON SPECIFIED Rx continuous O2 at 2L n/c   New  O2 concentrator  Lowest O2 sat 82% 1 Device 6   • potassium chloride (KLOR-CON) 20 MEQ Pack Take 100 mEq by mouth 2 times a day.     • moxifloxacin (AVELOX) 400 MG Tab Take 1 Tab by mouth every day. (Patient not taking: Reported on 5/29/2019) 14 Tab 0   • Macitentan (OPSUMIT) 10 MG Tab Take  by mouth.     • tadalafil (CIALIS) 20 MG tablet      • Cyanocobalamin (VITAMIN B-12) 1000 MCG Tab Take 1,000 mcg by mouth.     • digoxin (LANOXIN) 125 MCG Tab TAKE 1 TABLET BY MOUTH  DAILY.     • DILTIAZem (CARDIZEM) 30 MG Tab Take 1 tablet by mouth 4  times daily.     • Cholecalciferol (VITAMIN D3) 1000 units Cap Take 1,000 Units by mouth.     • Ascorbic Acid (VITAMIN C) 1000 MG Tab Take 1,000 mg by mouth.     • CALCIUM PO Take 500 mg by mouth.     • betamethasone dipropionate (DIPROLENE) 0.05 % Cream Apply topically as needed     • Dermatological Products, Misc. (EPICERAM) Emulsion Apply  to affected area(s).     • MAGNESIUM-ZINC PO Take  by mouth.     • temazepam (RESTORIL) 30 MG capsule Take 30 mg by mouth.     • oxyCODONE immediate-release (ROXICODONE) 5 MG Tab      • sulfaSALAzine (AZULFIDINE) 500 MG Tab Take 200 mg by mouth 4 times a day.     • gabapentin (NEURONTIN) 800 MG tablet      • triamcinolone acetonide (KENALOG) 0.1 % Cream      • ANORO ELLIPTA 62.5-25 MCG/INH AEROSOL POWDER, BREATH ACTIVATED      • calcium carbonate (OS-LUIS ARMANDO 500) 500 MG Tab Take 1,000 mg by mouth 2 times a day, with meals.     • furosemide (LASIX) 40 MG Tab Take 40 mg by mouth 3 times a day.     • hydroxychloroquine (PLAQUENIL) 200 MG Tab Take 200 mg by mouth 2 times a day.     • leflunomide (ARAVA) 20 MG Tab Take 20 mg by mouth every day.     • pantoprazole (PROTONIX) 40 MG Tablet Delayed Response Take 40 mg by mouth 2 times a day.     • tramadol (ULTRAM) 50 MG Tab Take 50 mg by mouth every four hours as needed.     • Treprostinil (TYVASO INH) Inhale 1.74 mg by mouth. Inhale 12 puffs orally four times daily   Indications: 1.74mg/2.9ml       No  "current facility-administered medications for this visit.          Allergies as of 05/29/2019 - Reviewed 05/29/2019   Allergen Reaction Noted   • Latex Anaphylaxis and Rash 10/09/2013   • Erythrocin  05/01/2019   • Tape  06/13/2016   • Warfarin sodium Hives 06/13/2016   • Coumarin Hives and Rash 10/09/2013        ROS: As per HPI.  Denies all other cardiopulmonary, GI, neurologic symptoms.    /72 (BP Location: Right arm, Patient Position: Sitting)   Pulse (!) 104   Temp 36.8 °C (98.2 °F) (Temporal)   Ht 1.676 m (5' 6\")   Wt 69.4 kg (153 lb)   SpO2 (!) 80%   BMI 24.69 kg/m²     Physical Exam:  Gen:         Alert and oriented, No apparent distress.  Neck:        No Lymphadenopathy or Bruits.  Lungs:     Clear to auscultation bilaterally, no wheezes rhonchi or crackles  CV:          Regular rate and rhythm. No murmurs, rubs or gallops.  Abd:         Soft non tender, non distended. Normal active bowel sounds.  No CVA tenderness                 Ext:          No clubbing, cyanosis, edema.      Assessment and Plan.   68 y.o. female with the following issues.    1. Recurrent UTI (urinary tract infection)  UA negative  UTI resolved  Patient declines referral to nephrology at this time  Consider further work-up if UTI recurs  Prashanth possible side effect of sulfasalazine with rheumatologist            Please note that this dictation was created using voice recognition software. I have made every reasonable attempt to correct obvious errors, but expect that there are errors of grammar and possible content that I did not discover before finalizing note.   "

## 2019-07-03 ENCOUNTER — TELEMEDICINE2 (OUTPATIENT)
Dept: MEDICAL GROUP | Age: 69
End: 2019-07-03
Payer: COMMERCIAL

## 2019-07-03 ENCOUNTER — NON-PROVIDER VISIT (OUTPATIENT)
Dept: MEDICAL GROUP | Facility: CLINIC | Age: 69
End: 2019-07-03
Payer: COMMERCIAL

## 2019-07-03 ENCOUNTER — TELEMEDICINE ORIGINATING SITE VISIT (OUTPATIENT)
Dept: MEDICAL GROUP | Facility: CLINIC | Age: 69
End: 2019-07-03

## 2019-07-03 VITALS
RESPIRATION RATE: 18 BRPM | OXYGEN SATURATION: 96 % | DIASTOLIC BLOOD PRESSURE: 86 MMHG | HEART RATE: 66 BPM | SYSTOLIC BLOOD PRESSURE: 140 MMHG

## 2019-07-03 VITALS
RESPIRATION RATE: 18 BRPM | BODY MASS INDEX: 23.63 KG/M2 | DIASTOLIC BLOOD PRESSURE: 74 MMHG | TEMPERATURE: 98.2 F | SYSTOLIC BLOOD PRESSURE: 124 MMHG | OXYGEN SATURATION: 91 % | HEART RATE: 75 BPM | HEIGHT: 66 IN | WEIGHT: 147 LBS

## 2019-07-03 DIAGNOSIS — R55 SYNCOPE, UNSPECIFIED SYNCOPE TYPE: ICD-10-CM

## 2019-07-03 DIAGNOSIS — M06.9 RHEUMATOID ARTHRITIS, INVOLVING UNSPECIFIED SITE, UNSPECIFIED RHEUMATOID FACTOR PRESENCE: ICD-10-CM

## 2019-07-03 DIAGNOSIS — E03.8 OTHER SPECIFIED HYPOTHYROIDISM: ICD-10-CM

## 2019-07-03 PROCEDURE — 93000 ELECTROCARDIOGRAM COMPLETE: CPT | Performed by: INTERNAL MEDICINE

## 2019-07-03 PROCEDURE — 36415 COLL VENOUS BLD VENIPUNCTURE: CPT | Performed by: FAMILY MEDICINE

## 2019-07-03 PROCEDURE — 99214 OFFICE O/P EST MOD 30 MIN: CPT | Performed by: INTERNAL MEDICINE

## 2019-07-03 RX ORDER — LEVOTHYROXINE SODIUM 0.15 MG/1
150 TABLET ORAL
Qty: 90 TAB | Refills: 1 | Status: SHIPPED | OUTPATIENT
Start: 2019-07-03 | End: 2019-08-22 | Stop reason: SDUPTHER

## 2019-07-03 NOTE — NON-PROVIDER
Kaylynn Polo is a 68 y.o. female here for a non-provider visit for EKG.    If abnormal was an in office provider notified today (if so, indicate provider)? Yes  Routed to PCP? Yes

## 2019-07-03 NOTE — NON-PROVIDER
Kaylynn Polo is a 68 y.o. female here for a non-provider visit for venipuncture.    If abnormal was an in office provider notified today (if so, indicate provider)? Yes  Routed to PCP? Yes

## 2019-07-07 NOTE — PROGRESS NOTES
CC: Episode of dizziness, syncopal episode    Verified identification with patient. Secured video conference with RN presenter in [facility]      HPI:   Kaylynn presents today with the following.    1. Syncope, unspecified syncope type  Patient with a PMH of pulmonary hypertension, O2 dependent, presents to the clinic with complaints of one episode of dizziness and syncope.  Patient walks 5 miles every day.  Yesterday, she was about to start her [place] Walk with her friend when she started to feel a bit dizzy, felt a bit weak and passed out for a few seconds.  She did not fall or hurt herself in any way.  She did not hit her head.  Her friend was holding her up as she grabbed a fence.  Patient did not walk that day and went back to the house, sat down because she felt tired.  The rest of the day was uneventful.  Patient admits to a headache otherwise denies any focal neurologic symptoms such as visual disturbance, one-sided weakness, balance issues, slurred speech, numbness or tingling, etc.  Patient denies chest pain, shortness of breath, palpitations or edema.  Patient states that she is back at her baseline except for a mild headache.  Patient has kept well-hydrated, eating well.  No change in medications.  Not taking anything over-the-counter that is new.    2. Other specified hypothyroidism  Was a bit of confusion on her levothyroxine dose.  Patient was increased to 150 MCG's daily, no repeat labs completed yet.      Patient Active Problem List    Diagnosis Date Noted   • Recurrent UTI (urinary tract infection) 05/29/2019   • Subacute maxillary sinusitis 05/15/2019   • Skin lesion of back 05/15/2019   • Pulmonary arterial hypertension (HCC) 04/24/2019   • History of deep venous thrombosis 04/24/2019   • History of pulmonary embolism 04/24/2019   • Chronic respiratory failure with hypoxia ([facility]) 04/24/2019   • Anticoagulation adequate 04/24/2019   • SVT (supraventricular tachycardia) ([facility]) 04/24/2019   •  Palpitations 04/24/2019   • Anticoagulated 04/24/2019   • Low hemoglobin 10/17/2018   • Renal insufficiency 10/17/2018   • Chronic right shoulder pain 02/27/2018   • Gastroesophageal reflux disease without esophagitis 02/27/2018   • Spinal stenosis of lumbar region with neurogenic claudication 12/28/2017   • Medication management 12/28/2017   • Abdominal discomfort, generalized 08/23/2017   • Hyperglycemia 05/03/2017   • SOB (shortness of breath) 04/05/2017   • Acute bronchitis due to Haemophilus influenzae 04/05/2017   • Lumbar discogenic pain syndrome 07/27/2016   • Dysphagia 07/27/2016   • Pulmonary hypertension (HCC) 06/13/2016   • Rheumatoid arthritis involving multiple sites with positive rheumatoid factor ([facility]) 06/13/2016   • Hypertension 06/13/2016   • Neuropathy ([facility]) 06/13/2016   • Iron (Fe) deficiency anemia 06/13/2016   • Vitamin B12 deficiency 06/13/2016   • Hypothyroidism due to acquired atrophy of thyroid 06/13/2016   • Pulmonary embolism ([facility]) 06/13/2016   • Multiple thyroid nodules 06/13/2016       Current Outpatient Prescriptions   Medication Sig Dispense Refill   • levothyroxine (SYNTHROID) 150 MCG Tab Take 1 Tab by mouth Every morning on an empty stomach. 90 Tab 1   • potassium chloride (KLOR-CON) 20 MEQ Pack Take 100 mEq by mouth 2 times a day.     • moxifloxacin (AVELOX) 400 MG Tab Take 1 Tab by mouth every day. (Patient not taking: Reported on 5/29/2019) 14 Tab 0   • albuterol 108 (90 Base) MCG/ACT Aero Soln inhalation aerosol Inhale 2 Puffs by mouth every 6 hours as needed for Shortness of Breath. 8.5 g 1   • Macitentan (OPSUMIT) 10 MG Tab Take  by mouth.     • ALPRAZolam (XANAX) 0.5 MG Tab Take 1 Tab by mouth at bedtime as needed for Sleep for up to 90 days. 30 Tab 2   • Folic Acid-Vit B6-Vit B12 (FOLBEE) 2.5-25-1 MG Tab Take 1 tablet by mouth every day. 90 Tab 1   • tadalafil (CIALIS) 20 MG tablet      • Cyanocobalamin (VITAMIN B-12) 1000 MCG Tab Take 1,000 mcg by mouth.     • digoxin  (LANOXIN) 125 MCG Tab TAKE 1 TABLET BY MOUTH  DAILY.     • DILTIAZem (CARDIZEM) 30 MG Tab Take 1 tablet by mouth 4  times daily.     • Cholecalciferol (VITAMIN D3) 1000 units Cap Take 1,000 Units by mouth.     • Ascorbic Acid (VITAMIN C) 1000 MG Tab Take 1,000 mg by mouth.     • CALCIUM PO Take 500 mg by mouth.     • VIRT-GILLIAN 2.5-25-1 MG Tab TAKE 1 TABLET BY MOUTH  EVERY DAY 90 Tab 1   • rivaroxaban (XARELTO) 15 MG Tab tablet Take 1 Tab by mouth with dinner. 90 Tab 2   • betamethasone dipropionate (DIPROLENE) 0.05 % Cream Apply topically as needed     • Dermatological Products, Misc. (EPICERAM) Emulsion Apply  to affected area(s).     • MAGNESIUM-ZINC PO Take  by mouth.     • temazepam (RESTORIL) 30 MG capsule Take 30 mg by mouth.     • budesonide (RINOCORT AQUA) 32 MCG/ACT nasal spray Spray 2 Sprays in nose every day. 1 Bottle 2   • XARELTO 15 MG Tab tablet Take 1 Tab by mouth with dinner. 90 Tab 3   • Ferrous Sulfate (IRON) 325 (65 Fe) MG Tab Take 1 Tab by mouth every day. 30 Tab 2   • oxyCODONE immediate-release (ROXICODONE) 5 MG Tab      • fluticasone (FLONASE) 50 MCG/ACT nasal spray Spray 1 Spray in nose every day. 16 g 6   • sulfaSALAzine (AZULFIDINE) 500 MG Tab Take 200 mg by mouth 4 times a day.     • gabapentin (NEURONTIN) 800 MG tablet      • triamcinolone acetonide (KENALOG) 0.1 % Cream      • ANORO ELLIPTA 62.5-25 MCG/INH AEROSOL POWDER, BREATH ACTIVATED      • NON SPECIFIED Rx continuous O2 at 2L n/c  New O2 concentrator  Lowest O2 sat 82%  SOB without use of O2 1 Each 11   • NON SPECIFIED Rx continuous O2 at 2L n/c   New O2 concentrator  Lowest O2 sat 82% 1 Device 6   • calcium carbonate (OS-LUIS ARMANDO 500) 500 MG Tab Take 1,000 mg by mouth 2 times a day, with meals.     • furosemide (LASIX) 40 MG Tab Take 40 mg by mouth 3 times a day.     • hydroxychloroquine (PLAQUENIL) 200 MG Tab Take 200 mg by mouth 2 times a day.     • leflunomide (ARAVA) 20 MG Tab Take 20 mg by mouth every day.     • pantoprazole  "(PROTONIX) 40 MG Tablet Delayed Response Take 40 mg by mouth 2 times a day.     • tramadol (ULTRAM) 50 MG Tab Take 50 mg by mouth every four hours as needed.     • Treprostinil (TYVASO INH) Inhale 1.74 mg by mouth. Inhale 12 puffs orally four times daily   Indications: 1.74mg/2.9ml       No current facility-administered medications for this visit.          Allergies as of 07/03/2019 - Reviewed 07/03/2019   Allergen Reaction Noted   • Latex Anaphylaxis and Rash 10/09/2013   • Erythrocin  05/01/2019   • Tape  06/13/2016   • Warfarin sodium Hives 06/13/2016   • Coumarin Hives and Rash 10/09/2013        ROS: As per HPI.  Denies all other cardiopulmonary, GI, neurologic, musculoskeletal symptoms.    /74 (BP Location: Right arm, Patient Position: Sitting)   Pulse 75   Temp 36.8 °C (98.2 °F) (Temporal)   Resp 18   Ht 1.676 m (5' 6\")   Wt 66.7 kg (147 lb)   SpO2 91%   BMI 23.73 kg/m²     Physical Exam:  Gen:         Alert and oriented, No apparent distress.  HEENT:    No Lymphadenopathy or Bruits.  Neck is supple.  PERRLA, EOMI.  No nystagmus noted.  Oropharynx clear without any exudate, moist mucous membranes.  No sinus tenderness across the maxilla.  Lungs:     Clear to auscultation bilaterally, no wheezes rhonchi or crackles  CV:          Regular rate and rhythm. No murmurs, rubs or gallops.  S1-S2 heard.  Abd:         Soft non tender, non distended. Normal active bowel sounds.  No  Hepatosplenomegaly, No pulsatile masses.                   Ext:          No clubbing, cyanosis, edema.  Neuro:      Cranial nerves II through XII intact.  Muscle strength 5/5 upper and lower extremity.  Sensations intact bilaterally upper and lower extremity.  Patient without any difficulties in ambulating.    Assessment and Plan.   68 y.o. female with the following issues.    1. Syncope, unspecified syncope type  EKG shows normal sinus rhythm, nonspecific interventricular conduction delay.  No ST segment elevations or " depressions.  Patient states that she is at her baseline at this time.  Physical exam benign.  No other services available in [facility] at this time  ER precautions provided and discussed with patient.  Further evaluation may be necessary in an ER.  Patient declines at this time  Stable, RTC 1 week  Patient to keep appointment with cardiology    2. Other specified hypothyroidism    - levothyroxine (SYNTHROID) 150 MCG Tab; Take 1 Tab by mouth Every morning on an empty stomach.  Dispense: 90 Tab; Refill: 1  - TSH+FREE T4    Total visit time, 25 minutes, greater than 50% of time spent in coordination of medical care.        Please note that this dictation was created using voice recognition software. I have made every reasonable attempt to correct obvious errors, but expect that there are errors of grammar and possible content that I did not discover before finalizing note.

## 2019-07-08 ENCOUNTER — NON-PROVIDER VISIT (OUTPATIENT)
Dept: MEDICAL GROUP | Facility: CLINIC | Age: 69
End: 2019-07-08
Payer: COMMERCIAL

## 2019-07-08 DIAGNOSIS — E03.4 HYPOTHYROIDISM DUE TO ACQUIRED ATROPHY OF THYROID: ICD-10-CM

## 2019-07-08 PROCEDURE — 36415 COLL VENOUS BLD VENIPUNCTURE: CPT | Performed by: INTERNAL MEDICINE

## 2019-07-11 ENCOUNTER — TELEMEDICINE2 (OUTPATIENT)
Dept: MEDICAL GROUP | Age: 69
End: 2019-07-11
Payer: COMMERCIAL

## 2019-07-11 ENCOUNTER — TELEMEDICINE ORIGINATING SITE VISIT (OUTPATIENT)
Dept: MEDICAL GROUP | Facility: CLINIC | Age: 69
End: 2019-07-11
Payer: COMMERCIAL

## 2019-07-11 VITALS
DIASTOLIC BLOOD PRESSURE: 81 MMHG | BODY MASS INDEX: 25.71 KG/M2 | OXYGEN SATURATION: 92 % | SYSTOLIC BLOOD PRESSURE: 139 MMHG | RESPIRATION RATE: 16 BRPM | HEIGHT: 66 IN | TEMPERATURE: 99.5 F | HEART RATE: 91 BPM | WEIGHT: 160 LBS

## 2019-07-11 DIAGNOSIS — Z86.2 H/O ANEMIA OF CHRONIC RENAL FAILURE: ICD-10-CM

## 2019-07-11 DIAGNOSIS — Z79.899 MEDICATION MANAGEMENT: ICD-10-CM

## 2019-07-11 DIAGNOSIS — Z87.448 H/O ANEMIA OF CHRONIC RENAL FAILURE: ICD-10-CM

## 2019-07-11 DIAGNOSIS — F41.9 ANXIETY: ICD-10-CM

## 2019-07-11 DIAGNOSIS — R42 LIGHTHEADED: ICD-10-CM

## 2019-07-11 DIAGNOSIS — I27.20 PULMONARY HYPERTENSION (HCC): ICD-10-CM

## 2019-07-11 DIAGNOSIS — D64.9 LOW HEMOGLOBIN: ICD-10-CM

## 2019-07-11 DIAGNOSIS — I27.21 PULMONARY ARTERIAL HYPERTENSION (HCC): ICD-10-CM

## 2019-07-11 DIAGNOSIS — E03.4 HYPOTHYROIDISM DUE TO ACQUIRED ATROPHY OF THYROID: ICD-10-CM

## 2019-07-11 PROCEDURE — 99214 OFFICE O/P EST MOD 30 MIN: CPT | Performed by: INTERNAL MEDICINE

## 2019-07-11 RX ORDER — RIVAROXABAN 15 MG/1
15 TABLET, FILM COATED ORAL
Qty: 90 TAB | Refills: 3 | Status: SHIPPED | OUTPATIENT
Start: 2019-07-11

## 2019-07-11 RX ORDER — ALPRAZOLAM 0.5 MG/1
0.5 TABLET ORAL NIGHTLY PRN
Qty: 30 TAB | Refills: 2 | OUTPATIENT
Start: 2019-07-11 | End: 2019-08-22 | Stop reason: SDUPTHER

## 2019-07-11 RX ORDER — TEMAZEPAM 30 MG/1
30 CAPSULE ORAL NIGHTLY PRN
Qty: 30 CAP | Refills: 2 | OUTPATIENT
Start: 2019-07-11 | End: 2019-08-22 | Stop reason: SDUPTHER

## 2019-07-11 NOTE — TELEPHONE ENCOUNTER
Was the patient seen in the last year in this department? Yes    Does patient have an active prescription for medications requested? Yes    Received Request Via: Pharmacy     Requested Prescriptions     Pending Prescriptions Disp Refills   • ALPRAZolam (XANAX) 0.5 MG Tab 30 Tab 2     Sig: Take 1 Tab by mouth at bedtime as needed for Sleep for up to 90 days.   • Folic Acid-Vit B6-Vit B12 (FOLBEE) 2.5-25-1 MG Tab 90 Tab 1     Sig: Take 1 tablet by mouth every day.   • XARELTO 15 MG Tab tablet 90 Tab 3     Sig: Take 1 Tab by mouth with dinner.   • temazepam (RESTORIL) 30 MG capsule 90 Cap 3     Sig: Take 1 Cap by mouth at bedtime as needed for Sleep for up to 90 days.

## 2019-07-19 NOTE — PROGRESS NOTES
CC: Follow-up lab work    Verified identification with patient. Secured video conference with RN presenter in [facility]      HPI:   Kaylynn presents today with the following.    1. Lightheaded  Patient was seen 1 week ago with a syncopal episode prior to going on a hike.  Patient stays very active other wise feels at baseline.  No new episodes.  Patient complains only of a mild headache in the morning.  No changes in oxygen level.  Blood pressure has been stable.  Keeping well hydrated.  Chest pain, increasing shortness of breath, PND visual changes palpitations or edema.    2. Hypothyroidism due to acquired atrophy of thyroid  TSH 5.97, up from 1.4.  Currently taking levothyroxine 150 MCG's daily.    3. Pulmonary arterial hypertension (HCC)  Patient has a follow-up appointment with [facility] for pulmonary function tests and echocardiogram. Patient is stable current occasions, no complaints.    4. Low hemoglobin  Hemoglobin level 12.2, MCV elevated 104.  Patient is taking iron 325 mg daily for the last 4 years.        Patient Active Problem List    Diagnosis Date Noted   • Recurrent UTI (urinary tract infection) 05/29/2019   • Subacute maxillary sinusitis 05/15/2019   • Skin lesion of back 05/15/2019   • Pulmonary arterial hypertension (HCC) 04/24/2019   • History of deep venous thrombosis 04/24/2019   • History of pulmonary embolism 04/24/2019   • Chronic respiratory failure with hypoxia (HCC) 04/24/2019   • Anticoagulation adequate 04/24/2019   • SVT (supraventricular tachycardia) ([facility]) 04/24/2019   • Palpitations 04/24/2019   • Anticoagulated 04/24/2019   • Low hemoglobin 10/17/2018   • Renal insufficiency 10/17/2018   • Chronic right shoulder pain 02/27/2018   • Gastroesophageal reflux disease without esophagitis 02/27/2018   • Spinal stenosis of lumbar region with neurogenic claudication 12/28/2017   • Medication management 12/28/2017   • Abdominal discomfort, generalized 08/23/2017   • Hyperglycemia 05/03/2017    • SOB (shortness of breath) 04/05/2017   • Acute bronchitis due to Haemophilus influenzae 04/05/2017   • Lumbar discogenic pain syndrome 07/27/2016   • Dysphagia 07/27/2016   • Pulmonary hypertension ([facility]) 06/13/2016   • Rheumatoid arthritis involving multiple sites with positive rheumatoid factor ([facility]) 06/13/2016   • Hypertension 06/13/2016   • Neuropathy ([facility]) 06/13/2016   • Iron (Fe) deficiency anemia 06/13/2016   • Vitamin B12 deficiency 06/13/2016   • Hypothyroidism due to acquired atrophy of thyroid 06/13/2016   • Pulmonary embolism ([facility]) 06/13/2016   • Multiple thyroid nodules 06/13/2016       Current Outpatient Prescriptions   Medication Sig Dispense Refill   • ALPRAZolam (XANAX) 0.5 MG Tab Take 1 Tab by mouth at bedtime as needed for Sleep for up to 90 days. 30 Tab 2   • Folic Acid-Vit B6-Vit B12 (FOLBEE) 2.5-25-1 MG Tab Take 1 tablet by mouth every day. 90 Tab 1   • XARELTO 15 MG Tab tablet Take 1 Tab by mouth with dinner. 90 Tab 3   • temazepam (RESTORIL) 30 MG capsule Take 1 Cap by mouth at bedtime as needed for Sleep for up to 90 days. 30 Cap 2   • levothyroxine (SYNTHROID) 150 MCG Tab Take 1 Tab by mouth Every morning on an empty stomach. 90 Tab 1   • potassium chloride (KLOR-CON) 20 MEQ Pack Take 100 mEq by mouth 2 times a day.     • moxifloxacin (AVELOX) 400 MG Tab Take 1 Tab by mouth every day. (Patient not taking: Reported on 5/29/2019) 14 Tab 0   • albuterol 108 (90 Base) MCG/ACT Aero Soln inhalation aerosol Inhale 2 Puffs by mouth every 6 hours as needed for Shortness of Breath. 8.5 g 1   • Macitentan (OPSUMIT) 10 MG Tab Take  by mouth.     • tadalafil (CIALIS) 20 MG tablet      • Cyanocobalamin (VITAMIN B-12) 1000 MCG Tab Take 1,000 mcg by mouth.     • digoxin (LANOXIN) 125 MCG Tab TAKE 1 TABLET BY MOUTH  DAILY.     • DILTIAZem (CARDIZEM) 30 MG Tab Take 1 tablet by mouth 4  times daily.     • Cholecalciferol (VITAMIN D3) 1000 units Cap Take 1,000 Units by mouth.     • Ascorbic Acid (VITAMIN C)  1000 MG Tab Take 1,000 mg by mouth.     • CALCIUM PO Take 500 mg by mouth.     • VIRT-GILLIAN 2.5-25-1 MG Tab TAKE 1 TABLET BY MOUTH  EVERY DAY 90 Tab 1   • rivaroxaban (XARELTO) 15 MG Tab tablet Take 1 Tab by mouth with dinner. 90 Tab 2   • betamethasone dipropionate (DIPROLENE) 0.05 % Cream Apply topically as needed     • Dermatological Products, Misc. (EPICERAM) Emulsion Apply  to affected area(s).     • MAGNESIUM-ZINC PO Take  by mouth.     • budesonide (RINOCORT AQUA) 32 MCG/ACT nasal spray Spray 2 Sprays in nose every day. 1 Bottle 2   • Ferrous Sulfate (IRON) 325 (65 Fe) MG Tab Take 1 Tab by mouth every day. 30 Tab 2   • oxyCODONE immediate-release (ROXICODONE) 5 MG Tab      • fluticasone (FLONASE) 50 MCG/ACT nasal spray Spray 1 Spray in nose every day. 16 g 6   • sulfaSALAzine (AZULFIDINE) 500 MG Tab Take 200 mg by mouth 4 times a day.     • gabapentin (NEURONTIN) 800 MG tablet      • triamcinolone acetonide (KENALOG) 0.1 % Cream      • ANORO ELLIPTA 62.5-25 MCG/INH AEROSOL POWDER, BREATH ACTIVATED      • NON SPECIFIED Rx continuous O2 at 2L n/c  New O2 concentrator  Lowest O2 sat 82%  SOB without use of O2 1 Each 11   • NON SPECIFIED Rx continuous O2 at 2L n/c   New O2 concentrator  Lowest O2 sat 82% 1 Device 6   • calcium carbonate (OS-LUIS ARMANDO 500) 500 MG Tab Take 1,000 mg by mouth 2 times a day, with meals.     • furosemide (LASIX) 40 MG Tab Take 40 mg by mouth 3 times a day.     • hydroxychloroquine (PLAQUENIL) 200 MG Tab Take 200 mg by mouth 2 times a day.     • leflunomide (ARAVA) 20 MG Tab Take 20 mg by mouth every day.     • pantoprazole (PROTONIX) 40 MG Tablet Delayed Response Take 40 mg by mouth 2 times a day.     • tramadol (ULTRAM) 50 MG Tab Take 50 mg by mouth every four hours as needed.     • Treprostinil (TYVASO INH) Inhale 1.74 mg by mouth. Inhale 12 puffs orally four times daily   Indications: 1.74mg/2.9ml       No current facility-administered medications for this visit.          Allergies as of  "07/11/2019 - Reviewed 07/11/2019   Allergen Reaction Noted   • Latex Anaphylaxis and Rash 10/09/2013   • Erythrocin  05/01/2019   • Tape  06/13/2016   • Warfarin sodium Hives 06/13/2016   • Coumarin Hives and Rash 10/09/2013        ROS: As per HPI.  Denies all other cardiopulmonary, GI, neurologic symptoms.    /81 (BP Location: Right arm, Patient Position: Sitting)   Pulse 91   Temp 37.5 °C (99.5 °F)   Resp 16   Ht 1.676 m (5' 6\")   Wt 72.6 kg (160 lb)   SpO2 92%   BMI 25.82 kg/m²     Physical Exam:  Gen:         Alert and oriented, No apparent distress.  Neck:        No Lymphadenopathy or Bruits.  Lungs:     Clear to auscultation bilaterally, no wheezes rhonchi or crackles  CV:          Regular rate and rhythm. No murmurs, rubs or gallops.  Abd:         Soft non tender, non distended. Normal active bowel sounds.  No  Hepatosplenomegaly, No pulsatile masses.                   Ext:          No clubbing, cyanosis, edema.      Assessment and Plan.   68 y.o. female with the following issues.    1. Lightheaded  No recurrence  Patient to make follow-up appointment with cardiology  Labs reviewed  ER precautions provided again to patient    2. Hypothyroidism due to acquired atrophy of thyroid  TSH a bit elevated.  Patient would like to continue levothyroxine 150 MCG's daily    3. Pulmonary arterial hypertension (HCC)  Follow-up with [facility] as scheduled  Continue current plan of care    4. H/O anemia of chronic renal failure    - VITAMIN B12; Future  - FOLATE; Future  - IRON/TOTAL IRON BIND; Future  - FERRITIN; Future            Please note that this dictation was created using voice recognition software. I have made every reasonable attempt to correct obvious errors, but expect that there are errors of grammar and possible content that I did not discover before finalizing note.   "

## 2019-08-01 ENCOUNTER — TELEPHONE (OUTPATIENT)
Dept: MEDICAL GROUP | Facility: CLINIC | Age: 69
End: 2019-08-01

## 2019-08-01 NOTE — TELEPHONE ENCOUNTER
Please verify that patient is supposed to be on both alprazolam 0.5mg and temazepam 30 mg per patient's pharmacy.

## 2019-08-21 ENCOUNTER — TELEMEDICINE2 (OUTPATIENT)
Dept: MEDICAL GROUP | Age: 69
End: 2019-08-21
Payer: COMMERCIAL

## 2019-08-21 ENCOUNTER — TELEMEDICINE ORIGINATING SITE VISIT (OUTPATIENT)
Dept: MEDICAL GROUP | Facility: CLINIC | Age: 69
End: 2019-08-21
Payer: COMMERCIAL

## 2019-08-21 VITALS
BODY MASS INDEX: 24.17 KG/M2 | DIASTOLIC BLOOD PRESSURE: 77 MMHG | TEMPERATURE: 99.7 F | RESPIRATION RATE: 18 BRPM | OXYGEN SATURATION: 90 % | HEIGHT: 67 IN | SYSTOLIC BLOOD PRESSURE: 124 MMHG | HEART RATE: 86 BPM | WEIGHT: 154 LBS

## 2019-08-21 DIAGNOSIS — J96.11 CHRONIC RESPIRATORY FAILURE WITH HYPOXIA (HCC): ICD-10-CM

## 2019-08-21 DIAGNOSIS — E03.8 OTHER SPECIFIED HYPOTHYROIDISM: ICD-10-CM

## 2019-08-21 DIAGNOSIS — Z79.899 MEDICATION MANAGEMENT: ICD-10-CM

## 2019-08-21 DIAGNOSIS — F41.9 ANXIETY: ICD-10-CM

## 2019-08-21 DIAGNOSIS — R10.13 EPIGASTRIC PAIN: ICD-10-CM

## 2019-08-21 PROCEDURE — 99213 OFFICE O/P EST LOW 20 MIN: CPT | Performed by: INTERNAL MEDICINE

## 2019-08-22 ENCOUNTER — TELEPHONE (OUTPATIENT)
Dept: MEDICAL GROUP | Facility: CLINIC | Age: 69
End: 2019-08-22

## 2019-08-22 RX ORDER — TEMAZEPAM 30 MG/1
30 CAPSULE ORAL NIGHTLY PRN
Qty: 90 CAP | Refills: 1 | OUTPATIENT
Start: 2019-08-22 | End: 2019-10-16 | Stop reason: SDUPTHER

## 2019-08-22 RX ORDER — LEVOTHYROXINE SODIUM 0.15 MG/1
150 TABLET ORAL
Qty: 90 TAB | Refills: 1 | Status: SHIPPED | OUTPATIENT
Start: 2019-08-22 | End: 2019-10-31 | Stop reason: SDUPTHER

## 2019-08-22 RX ORDER — ALPRAZOLAM 0.5 MG/1
0.5 TABLET ORAL NIGHTLY PRN
Qty: 90 TAB | Refills: 1 | Status: SHIPPED | OUTPATIENT
Start: 2019-08-22 | End: 2019-12-05 | Stop reason: SDUPTHER

## 2019-08-22 NOTE — PROGRESS NOTES
CC: Abdominal pain    Verified identification with patient. Secured video conference with RN presenter in [facility]      HPI:   Kaylynn presents today with the following.    1. Epigastric pain  Patient presents with a 2-week history of upper abdominal pain associated with nausea after eating and lightheadedness.  On August 5, patient had an echocardiogram and since the procedure patient has had upper epigastric pain that is reproducible by pressing as well as nausea after eating with lightheadedness.  No symptoms prior to echocardiogram.  Patient denies diarrhea, bloody stool, fevers or chills.  Symptoms last a couple of hours after eating.  Echocardiogram and pulmonary function test per patient were stable.  Patient has a good appetite and is having regular bowel movements.  Lightheadedness is not associated with shortness of breath, chest pains or palpitations.  Blood pressure has been stable.  Patient has not started any new medications, not taking anything over-the-counter.  Recent EKG with no acute changes.      Patient Active Problem List    Diagnosis Date Noted   • Epigastric pain 08/21/2019   • Recurrent UTI (urinary tract infection) 05/29/2019   • Subacute maxillary sinusitis 05/15/2019   • Skin lesion of back 05/15/2019   • Pulmonary arterial hypertension (HCC) 04/24/2019   • History of deep venous thrombosis 04/24/2019   • History of pulmonary embolism 04/24/2019   • Chronic respiratory failure with hypoxia ([facility]) 04/24/2019   • Anticoagulation adequate 04/24/2019   • SVT (supraventricular tachycardia) ([facility]) 04/24/2019   • Palpitations 04/24/2019   • Anticoagulated 04/24/2019   • Low hemoglobin 10/17/2018   • Renal insufficiency 10/17/2018   • Chronic right shoulder pain 02/27/2018   • Gastroesophageal reflux disease without esophagitis 02/27/2018   • Spinal stenosis of lumbar region with neurogenic claudication 12/28/2017   • Medication management 12/28/2017   • Abdominal discomfort, generalized  08/23/2017   • Hyperglycemia 05/03/2017   • SOB (shortness of breath) 04/05/2017   • Acute bronchitis due to Haemophilus influenzae 04/05/2017   • Lumbar discogenic pain syndrome 07/27/2016   • Dysphagia 07/27/2016   • Pulmonary hypertension (HCC) 06/13/2016   • Rheumatoid arthritis involving multiple sites with positive rheumatoid factor (HCC) 06/13/2016   • Hypertension 06/13/2016   • Neuropathy (HCC) 06/13/2016   • Iron (Fe) deficiency anemia 06/13/2016   • Vitamin B12 deficiency 06/13/2016   • Hypothyroidism due to acquired atrophy of thyroid 06/13/2016   • Pulmonary embolism (HCC) 06/13/2016   • Multiple thyroid nodules 06/13/2016       Current Outpatient Medications   Medication Sig Dispense Refill   • ALPRAZolam (XANAX) 0.5 MG Tab Take 1 Tab by mouth at bedtime as needed for Sleep for up to 90 days. 30 Tab 2   • Folic Acid-Vit B6-Vit B12 (FOLBEE) 2.5-25-1 MG Tab Take 1 tablet by mouth every day. 90 Tab 1   • temazepam (RESTORIL) 30 MG capsule Take 1 Cap by mouth at bedtime as needed for Sleep for up to 90 days. 30 Cap 2   • levothyroxine (SYNTHROID) 150 MCG Tab Take 1 Tab by mouth Every morning on an empty stomach. 90 Tab 1   • albuterol 108 (90 Base) MCG/ACT Aero Soln inhalation aerosol Inhale 2 Puffs by mouth every 6 hours as needed for Shortness of Breath. 8.5 g 1   • Macitentan (OPSUMIT) 10 MG Tab Take  by mouth.     • tadalafil (CIALIS) 20 MG tablet      • Cyanocobalamin (VITAMIN B-12) 1000 MCG Tab Take 1,000 mcg by mouth.     • digoxin (LANOXIN) 125 MCG Tab TAKE 1 TABLET BY MOUTH  DAILY.     • DILTIAZem (CARDIZEM) 30 MG Tab Take 1 tablet by mouth 4  times daily.     • Cholecalciferol (VITAMIN D3) 1000 units Cap Take 1,000 Units by mouth.     • Ascorbic Acid (VITAMIN C) 1000 MG Tab Take 1,000 mg by mouth.     • CALCIUM PO Take 500 mg by mouth.     • rivaroxaban (XARELTO) 15 MG Tab tablet Take 1 Tab by mouth with dinner. 90 Tab 2   • betamethasone dipropionate (DIPROLENE) 0.05 % Cream Apply topically as  needed     • MAGNESIUM-ZINC PO Take  by mouth.     • Ferrous Sulfate (IRON) 325 (65 Fe) MG Tab Take 1 Tab by mouth every day. 30 Tab 2   • fluticasone (FLONASE) 50 MCG/ACT nasal spray Spray 1 Spray in nose every day. 16 g 6   • sulfaSALAzine (AZULFIDINE) 500 MG Tab Take 500 mg by mouth 3 times a day.     • gabapentin (NEURONTIN) 800 MG tablet      • triamcinolone acetonide (KENALOG) 0.1 % Cream      • NON SPECIFIED Rx continuous O2 at 2L n/c  New O2 concentrator  Lowest O2 sat 82%  SOB without use of O2 1 Each 11   • NON SPECIFIED Rx continuous O2 at 2L n/c   New O2 concentrator  Lowest O2 sat 82% 1 Device 6   • calcium carbonate (OS-LUIS ARMANDO 500) 500 MG Tab Take 1,000 mg by mouth 2 times a day, with meals.     • furosemide (LASIX) 40 MG Tab Take 40 mg by mouth 3 times a day.     • hydroxychloroquine (PLAQUENIL) 200 MG Tab Take 200 mg by mouth 2 times a day.     • leflunomide (ARAVA) 20 MG Tab Take 20 mg by mouth every day.     • pantoprazole (PROTONIX) 40 MG Tablet Delayed Response Take 40 mg by mouth 2 times a day.     • Treprostinil (TYVASO INH) Inhale 1.74 mg by mouth. Inhale 12 puffs orally four times daily   Indications: 1.74mg/2.9ml     • XARELTO 15 MG Tab tablet Take 1 Tab by mouth with dinner. 90 Tab 3   • potassium chloride (KLOR-CON) 20 MEQ Pack Take 100 mEq by mouth 2 times a day.     • moxifloxacin (AVELOX) 400 MG Tab Take 1 Tab by mouth every day. (Patient not taking: Reported on 5/29/2019) 14 Tab 0   • VIRT-GILLIAN 2.5-25-1 MG Tab TAKE 1 TABLET BY MOUTH  EVERY DAY 90 Tab 1   • Dermatological Products, Misc. (EPICERAM) Emulsion Apply  to affected area(s).     • budesonide (RINOCORT AQUA) 32 MCG/ACT nasal spray Spray 2 Sprays in nose every day. (Patient not taking: Reported on 8/21/2019) 1 Bottle 2   • oxyCODONE immediate-release (ROXICODONE) 5 MG Tab      • ANORO ELLIPTA 62.5-25 MCG/INH AEROSOL POWDER, BREATH ACTIVATED      • tramadol (ULTRAM) 50 MG Tab Take 50 mg by mouth every four hours as needed.       No  "current facility-administered medications for this visit.          Allergies as of 08/21/2019 - Reviewed 08/21/2019   Allergen Reaction Noted   • Latex Anaphylaxis and Rash 10/09/2013   • Erythrocin  05/01/2019   • Tape  06/13/2016   • Warfarin sodium Hives 06/13/2016   • Coumarin Hives and Rash 10/09/2013        ROS: As per HPI.  Denies all other cardiopulmonary, GI, neurologic symptoms.    /77 (BP Location: Right arm, Patient Position: Sitting, BP Cuff Size: Adult)   Pulse 86   Temp 37.6 °C (99.7 °F) (Temporal)   Resp 18   Ht 1.702 m (5' 7\")   Wt 69.9 kg (154 lb)   SpO2 90%   BMI 24.12 kg/m²     Physical Exam:  Gen:         Alert and oriented, No apparent distress.  Neck:        No Lymphadenopathy or Bruits.  No JVD.  Neck is supple.  No thyromegaly.  Lungs:     Clear to auscultation bilaterally, no wheezes rhonchi or crackles  CV:          Regular rate and rhythm. No murmurs, rubs or gallops.  Abd:         Soft abdomen, mild to moderate tenderness to deep palpation in mid and epigastric area.  No rebound no guarding.. Normal active bowel sounds.  No  Hepatosplenomegaly, No pulsatile masses.                   Ext:          No clubbing, cyanosis, edema.      Assessment and Plan.   68 y.o. female with the following issues.    1. Epigastric pain  No imaging or labs available at the [facility] site at this time.  Vitals are stable, patient without any complaints in the clinic  Advised patient to follow-up in the emergency room for further evaluation and treatment   Patient shows understanding of medical decision making and is stable upon leaving the clinic          Please note that this dictation was created using voice recognition software. I have made every reasonable attempt to correct obvious errors, but expect that there are errors of grammar and possible content that I did not discover before finalizing note.   "

## 2019-08-22 NOTE — TELEPHONE ENCOUNTER
Patient is requesting a change from Protonix QD to BID.    See Media for ER records:  CT Pelvis, Chest Xray and Labs.    Patient is inquiring about referral discussed at last visit?

## 2019-08-23 DIAGNOSIS — R10.13 EPIGASTRIC PAIN: ICD-10-CM

## 2019-08-23 NOTE — TELEPHONE ENCOUNTER
Patient is requesting that the Referral to GI be changed to Urgent, and states the Protonix is not working.

## 2019-08-24 DIAGNOSIS — R10.13 EPIGASTRIC PAIN: ICD-10-CM

## 2019-08-26 ENCOUNTER — TELEPHONE (OUTPATIENT)
Dept: MEDICAL GROUP | Facility: CLINIC | Age: 69
End: 2019-08-26

## 2019-08-26 NOTE — TELEPHONE ENCOUNTER
please notify patient that ct did not show any signs of kidney stones or kidney issues.  If she is still having pain, I would recommend she follow up.

## 2019-08-27 ENCOUNTER — OFFICE VISIT (OUTPATIENT)
Dept: MEDICAL GROUP | Facility: CLINIC | Age: 69
End: 2019-08-27
Payer: COMMERCIAL

## 2019-08-27 VITALS
HEART RATE: 77 BPM | OXYGEN SATURATION: 90 % | BODY MASS INDEX: 23.54 KG/M2 | RESPIRATION RATE: 18 BRPM | SYSTOLIC BLOOD PRESSURE: 137 MMHG | HEIGHT: 67 IN | WEIGHT: 150 LBS | TEMPERATURE: 98.6 F | DIASTOLIC BLOOD PRESSURE: 87 MMHG

## 2019-08-27 DIAGNOSIS — R13.10 DYSPHAGIA, UNSPECIFIED TYPE: ICD-10-CM

## 2019-08-27 DIAGNOSIS — I47.10 SVT (SUPRAVENTRICULAR TACHYCARDIA): ICD-10-CM

## 2019-08-27 DIAGNOSIS — R00.2 PALPITATIONS: ICD-10-CM

## 2019-08-27 DIAGNOSIS — K21.9 GASTROESOPHAGEAL REFLUX DISEASE WITHOUT ESOPHAGITIS: ICD-10-CM

## 2019-08-27 DIAGNOSIS — R10.84 ABDOMINAL DISCOMFORT, GENERALIZED: ICD-10-CM

## 2019-08-27 DIAGNOSIS — R10.13 EPIGASTRIC PAIN: ICD-10-CM

## 2019-08-27 PROCEDURE — 36415 COLL VENOUS BLD VENIPUNCTURE: CPT | Performed by: PHYSICIAN ASSISTANT

## 2019-08-27 PROCEDURE — 99214 OFFICE O/P EST MOD 30 MIN: CPT | Performed by: PHYSICIAN ASSISTANT

## 2019-08-27 RX ORDER — ALBUTEROL SULFATE 90 UG/1
2 AEROSOL, METERED RESPIRATORY (INHALATION)
COMMUNITY

## 2019-08-27 RX ORDER — FAMOTIDINE 40 MG/1
40 TABLET, FILM COATED ORAL 2 TIMES DAILY
Qty: 60 TAB | Refills: 0 | Status: SHIPPED | OUTPATIENT
Start: 2019-08-27 | End: 2019-08-27 | Stop reason: SDUPTHER

## 2019-08-27 RX ORDER — GATIFLOXACIN 5 MG/ML
SOLUTION/ DROPS OPHTHALMIC
COMMUNITY
Start: 2019-07-01

## 2019-08-27 RX ORDER — TADALAFIL 20 MG/1
TABLET ORAL
COMMUNITY
Start: 2019-08-20 | End: 2020-01-28

## 2019-08-27 RX ORDER — FAMOTIDINE 40 MG/1
40 TABLET, FILM COATED ORAL 2 TIMES DAILY
Qty: 180 TAB | Refills: 2 | Status: SHIPPED | OUTPATIENT
Start: 2019-08-27 | End: 2019-10-31 | Stop reason: SDUPTHER

## 2019-08-27 RX ORDER — DIGOXIN 125 MCG
TABLET ORAL
COMMUNITY
Start: 2019-03-04

## 2019-08-27 ASSESSMENT — PATIENT HEALTH QUESTIONNAIRE - PHQ9: CLINICAL INTERPRETATION OF PHQ2 SCORE: 0

## 2019-08-27 NOTE — PROGRESS NOTES
"cc:  Follow up test results    Subjective:     Kaylynn Polo is a 68 y.o. female presenting for follow up tests      Patient presents to office to follow up with test results. Patient states that she has been having pain in the esophageal and epigastric area.  She states that when she eats, the pain is severe. She states that she has been hurting worse since the echo.  She has been getting dizzy and nauseated.  She went to the ER last week and patient feels that they treated her like \"she was crazy\".  She has a telehealth consult on September 10th.   She has had constipation but still has a daily bowel movement.  She denies blood in the stools.  She has not had hemoptysis.  She states that this pain cannot be improved or if there is nothing that can be done, she feels that this is not living and does not want to go on anymore.  She denies being suicidal but is trying to emphasize how severe her symptoms are.    Review of systems:  See above.  Denies any other symptoms unless previously indicated.      Current Outpatient Medications:   •  famotidine (PEPCID) 40 MG Tab, Take 1 Tab by mouth 2 times a day., Disp: 180 Tab, Rfl: 2  •  Influenza Vaccine Quad pf 0.5 ML Suspension Prefilled Syringe injection, TO BE ADMINISTERED BY THE PHARMACIST FOR IMMUNIZATION, Disp: , Rfl:   •  digoxin (LANOXIN) 125 MCG Tab, TAKE 1 TABLET BY MOUTH  DAILY., Disp: , Rfl:   •  DILTIAZem (CARDIZEM) 30 MG Tab, Take 1 tablet by mouth 4  times daily., Disp: , Rfl:   •  Treprostinil 0.6 MG/ML Solution, Inhale 12-15 Puffs by mouth., Disp: , Rfl:   •  gatifloxacin 0.5% (ZYMAXID) 0.5 % Solution, , Disp: , Rfl:   •  Tadalafil, PAH, 20 MG Tab, , Disp: , Rfl:   •  albuterol 108 (90 Base) MCG/ACT Aero Soln inhalation aerosol, Inhale 2 Puffs by mouth., Disp: , Rfl:   •  levothyroxine (SYNTHROID) 150 MCG Tab, Take 1 Tab by mouth Every morning on an empty stomach., Disp: 90 Tab, Rfl: 1  •  rivaroxaban (XARELTO) 15 MG Tab tablet, Take 1 Tab by mouth with " dinner., Disp: 90 Tab, Rfl: 2  •  temazepam (RESTORIL) 30 MG capsule, Take 1 Cap by mouth at bedtime as needed for Sleep for up to 180 days., Disp: 90 Cap, Rfl: 1  •  ALPRAZolam (XANAX) 0.5 MG Tab, Take 1 Tab by mouth at bedtime as needed for Sleep for up to 180 days., Disp: 90 Tab, Rfl: 1  •  Folic Acid-Vit B6-Vit B12 (FOLBEE) 2.5-25-1 MG Tab, Take 1 tablet by mouth every day., Disp: 90 Tab, Rfl: 1  •  XARELTO 15 MG Tab tablet, Take 1 Tab by mouth with dinner., Disp: 90 Tab, Rfl: 3  •  potassium chloride (KLOR-CON) 20 MEQ Pack, Take 100 mEq by mouth 2 times a day., Disp: , Rfl:   •  albuterol 108 (90 Base) MCG/ACT Aero Soln inhalation aerosol, Inhale 2 Puffs by mouth every 6 hours as needed for Shortness of Breath., Disp: 8.5 g, Rfl: 1  •  Macitentan (OPSUMIT) 10 MG Tab, Take  by mouth., Disp: , Rfl:   •  tadalafil (CIALIS) 20 MG tablet, , Disp: , Rfl:   •  Cyanocobalamin (VITAMIN B-12) 1000 MCG Tab, Take 1,000 mcg by mouth., Disp: , Rfl:   •  digoxin (LANOXIN) 125 MCG Tab, TAKE 1 TABLET BY MOUTH  DAILY., Disp: , Rfl:   •  DILTIAZem (CARDIZEM) 30 MG Tab, Take 1 tablet by mouth 4  times daily., Disp: , Rfl:   •  Cholecalciferol (VITAMIN D3) 1000 units Cap, Take 1,000 Units by mouth., Disp: , Rfl:   •  Ascorbic Acid (VITAMIN C) 1000 MG Tab, Take 1,000 mg by mouth., Disp: , Rfl:   •  CALCIUM PO, Take 500 mg by mouth., Disp: , Rfl:   •  VIRT-GILLIAN 2.5-25-1 MG Tab, TAKE 1 TABLET BY MOUTH  EVERY DAY, Disp: 90 Tab, Rfl: 1  •  betamethasone dipropionate (DIPROLENE) 0.05 % Cream, Apply topically as needed, Disp: , Rfl:   •  Dermatological Products, Misc. (EPICERAM) Emulsion, Apply  to affected area(s)., Disp: , Rfl:   •  MAGNESIUM-ZINC PO, Take  by mouth., Disp: , Rfl:   •  budesonide (RINOCORT AQUA) 32 MCG/ACT nasal spray, Spray 2 Sprays in nose every day. (Patient not taking: Reported on 8/21/2019), Disp: 1 Bottle, Rfl: 2  •  Ferrous Sulfate (IRON) 325 (65 Fe) MG Tab, Take 1 Tab by mouth every day., Disp: 30 Tab, Rfl: 2  •  " fluticasone (FLONASE) 50 MCG/ACT nasal spray, Spray 1 Spray in nose every day., Disp: 16 g, Rfl: 6  •  sulfaSALAzine (AZULFIDINE) 500 MG Tab, Take 500 mg by mouth 3 times a day., Disp: , Rfl:   •  gabapentin (NEURONTIN) 800 MG tablet, , Disp: , Rfl:   •  triamcinolone acetonide (KENALOG) 0.1 % Cream, , Disp: , Rfl:   •  ANORO ELLIPTA 62.5-25 MCG/INH AEROSOL POWDER, BREATH ACTIVATED, , Disp: , Rfl:   •  NON SPECIFIED, Rx continuous O2 at 2L n/c New O2 concentrator Lowest O2 sat 82% SOB without use of O2, Disp: 1 Each, Rfl: 11  •  NON SPECIFIED, Rx continuous O2 at 2L n/c  New O2 concentrator Lowest O2 sat 82%, Disp: 1 Device, Rfl: 6  •  calcium carbonate (OS-LUIS ARMANDO 500) 500 MG Tab, Take 1,000 mg by mouth 2 times a day, with meals., Disp: , Rfl:   •  furosemide (LASIX) 40 MG Tab, Take 40 mg by mouth 3 times a day., Disp: , Rfl:   •  hydroxychloroquine (PLAQUENIL) 200 MG Tab, Take 200 mg by mouth 2 times a day., Disp: , Rfl:   •  leflunomide (ARAVA) 20 MG Tab, Take 20 mg by mouth every day., Disp: , Rfl:   •  pantoprazole (PROTONIX) 40 MG Tablet Delayed Response, Take 40 mg by mouth 2 times a day., Disp: , Rfl:   •  tramadol (ULTRAM) 50 MG Tab, Take 50 mg by mouth every four hours as needed., Disp: , Rfl:   •  Treprostinil (TYVASO INH), Inhale 1.74 mg by mouth. Inhale 12 puffs orally four times daily   Indications: 1.74mg/2.9ml, Disp: , Rfl:     Allergies, past medical history, past surgical history, family history, social history reviewed and updated    Objective:     Vitals: /87 (BP Location: Right arm, Patient Position: Sitting)   Pulse 77   Temp 37 °C (98.6 °F)   Resp 18   Ht 1.702 m (5' 7\")   Wt 68 kg (150 lb)   SpO2 90%   BMI 23.49 kg/m²   General: Alert, pleasant, NAD  EYES:   PERRL, EOMI, no icterus or pallor.  Conjunctivae and lids normal.   HENT:  Normocephalic.  External ears normal.  Neck supple.   Abdomen: Not obese  Skin: Warm, dry, no rashes.  Musculoskeletal: Gait is normal.  Moves all " extremities well.  Neurological: No tremors, sensation grossly intact, CN2-12 intact.  Psych:  Affect/mood is normal, judgement is good, memory is intact, grooming is appropriate.    Assessment/Plan:     Kaylynn was seen today for follow-up.    Diagnoses and all orders for this visit:    Abdominal discomfort, generalized  -     H. PYLORI BREATH TEST  -     AMYLASE; Future  -     LIPASE; Future  -     REFERRAL TO GASTROENTEROLOGY  -     Discontinue: famotidine (PEPCID) 40 MG Tab; Take 1 Tab by mouth 2 times a day.  -     famotidine (PEPCID) 40 MG Tab; Take 1 Tab by mouth 2 times a day.  Epigastric pain  -     H. PYLORI BREATH TEST  -     AMYLASE; Future  -     LIPASE; Future  -     REFERRAL TO GASTROENTEROLOGY  -     Discontinue: famotidine (PEPCID) 40 MG Tab; Take 1 Tab by mouth 2 times a day.  -     famotidine (PEPCID) 40 MG Tab; Take 1 Tab by mouth 2 times a day.  Gastroesophageal reflux disease without esophagitis  -     H. PYLORI BREATH TEST  -     AMYLASE; Future  -     LIPASE; Future  -     REFERRAL TO GASTROENTEROLOGY  -     Discontinue: famotidine (PEPCID) 40 MG Tab; Take 1 Tab by mouth 2 times a day.  -     famotidine (PEPCID) 40 MG Tab; Take 1 Tab by mouth 2 times a day.  Dysphagia, unspecified type  -     H. PYLORI BREATH TEST  -     AMYLASE; Future  -     LIPASE; Future  -     REFERRAL TO GASTROENTEROLOGY  -     Discontinue: famotidine (PEPCID) 40 MG Tab; Take 1 Tab by mouth 2 times a day.  -     famotidine (PEPCID) 40 MG Tab; Take 1 Tab by mouth 2 times a day.    Ultimately what patient needs is an endoscopy.  I do not see H. pylori has been checked and we will also check amylase and lipase.  I have resubmitted her gastroenterology referral and we will get her in sooner.  She will call me if they have not been able to accomplish this.  Will notify patient of results when received.  She will discuss with her rheumatologist sulfasalazine as this may be contributing to her symptoms as well.    No follow-ups  on file.    Please note that this dictation was created using voice recognition software. I have made every reasonable attempt to correct obvious errors, but expect that there are errors of grammar and possible content that I did not discover before finalizing note.

## 2019-09-03 ENCOUNTER — TELEMEDICINE ORIGINATING SITE VISIT (OUTPATIENT)
Dept: MEDICAL GROUP | Facility: CLINIC | Age: 69
End: 2019-09-03
Payer: COMMERCIAL

## 2019-09-03 DIAGNOSIS — R10.84 ABDOMINAL DISCOMFORT, GENERALIZED: ICD-10-CM

## 2019-09-04 ENCOUNTER — OFFICE VISIT (OUTPATIENT)
Dept: MEDICAL GROUP | Facility: CLINIC | Age: 69
End: 2019-09-04
Payer: COMMERCIAL

## 2019-09-04 VITALS
WEIGHT: 151 LBS | HEART RATE: 74 BPM | HEIGHT: 67 IN | SYSTOLIC BLOOD PRESSURE: 131 MMHG | TEMPERATURE: 98.8 F | OXYGEN SATURATION: 95 % | RESPIRATION RATE: 18 BRPM | DIASTOLIC BLOOD PRESSURE: 87 MMHG | BODY MASS INDEX: 23.7 KG/M2

## 2019-09-04 DIAGNOSIS — R10.84 ABDOMINAL DISCOMFORT, GENERALIZED: ICD-10-CM

## 2019-09-04 DIAGNOSIS — H93.13 TINNITUS OF BOTH EARS: ICD-10-CM

## 2019-09-04 DIAGNOSIS — G62.9 NEUROPATHY: ICD-10-CM

## 2019-09-04 DIAGNOSIS — R21 RASH: ICD-10-CM

## 2019-09-04 PROCEDURE — 99214 OFFICE O/P EST MOD 30 MIN: CPT | Performed by: PHYSICIAN ASSISTANT

## 2019-09-04 RX ORDER — METHYLPREDNISOLONE 4 MG/1
TABLET ORAL
Qty: 1 KIT | Refills: 0 | Status: SHIPPED | OUTPATIENT
Start: 2019-09-04 | End: 2019-11-27

## 2019-09-04 NOTE — PROGRESS NOTES
cc:  Follow up    Subjective:     Kaylynn Polo is a 68 y.o. female presenting for follow up      Patient presents to the office for follow up.  She did see GI.  She saw them through telehealth.  She states that she was to hear yesterday or today about scheduling a scope but has not heard yet.  She was told to take one pepcid a day.  She has been having ringing in her ears for the last week.  She did talk to rheumatology about stopping the sulfasalazine but she cannot because of the pain. She has not been exposed to any loud noises.  She states that she is having crusting in both ears.  She denies fever, chills but has had dizziness.      Patient has been having a difficult time controlling her neuropathy.  It is worse in her feet.  She states in the past, prednisone has helped.  She states that she was told in the past that when her flareup became the significant that it was not necessarily neuropathy but it could be a flare of her rheumatoid arthritis.  Because her feet have been so painful the last several weeks with difficulty walking, she would like to try a low-dose steroid again just for a short period to see if this will help her symptoms.    Review of systems:  See above.       Current Outpatient Medications:   •  methylPREDNISolone (MEDROL DOSEPAK) 4 MG Tablet Therapy Pack, Take as directed, Disp: 1 Kit, Rfl: 0  •  famotidine (PEPCID) 40 MG Tab, Take 1 Tab by mouth 2 times a day., Disp: 180 Tab, Rfl: 2  •  Influenza Vaccine Quad pf 0.5 ML Suspension Prefilled Syringe injection, TO BE ADMINISTERED BY THE PHARMACIST FOR IMMUNIZATION, Disp: , Rfl:   •  gatifloxacin 0.5% (ZYMAXID) 0.5 % Solution, , Disp: , Rfl:   •  Tadalafil, PAH, 20 MG Tab, , Disp: , Rfl:   •  albuterol 108 (90 Base) MCG/ACT Aero Soln inhalation aerosol, Inhale 2 Puffs by mouth., Disp: , Rfl:   •  digoxin (LANOXIN) 125 MCG Tab, TAKE 1 TABLET BY MOUTH  DAILY., Disp: , Rfl:   •  DILTIAZem (CARDIZEM) 30 MG Tab, Take 1 tablet by mouth 4  times  daily., Disp: , Rfl:   •  Treprostinil 0.6 MG/ML Solution, Inhale 12-15 Puffs by mouth., Disp: , Rfl:   •  levothyroxine (SYNTHROID) 150 MCG Tab, Take 1 Tab by mouth Every morning on an empty stomach., Disp: 90 Tab, Rfl: 1  •  rivaroxaban (XARELTO) 15 MG Tab tablet, Take 1 Tab by mouth with dinner., Disp: 90 Tab, Rfl: 2  •  temazepam (RESTORIL) 30 MG capsule, Take 1 Cap by mouth at bedtime as needed for Sleep for up to 180 days., Disp: 90 Cap, Rfl: 1  •  ALPRAZolam (XANAX) 0.5 MG Tab, Take 1 Tab by mouth at bedtime as needed for Sleep for up to 180 days., Disp: 90 Tab, Rfl: 1  •  Folic Acid-Vit B6-Vit B12 (FOLBEE) 2.5-25-1 MG Tab, Take 1 tablet by mouth every day., Disp: 90 Tab, Rfl: 1  •  XARELTO 15 MG Tab tablet, Take 1 Tab by mouth with dinner., Disp: 90 Tab, Rfl: 3  •  potassium chloride (KLOR-CON) 20 MEQ Pack, Take 100 mEq by mouth 2 times a day., Disp: , Rfl:   •  albuterol 108 (90 Base) MCG/ACT Aero Soln inhalation aerosol, Inhale 2 Puffs by mouth every 6 hours as needed for Shortness of Breath., Disp: 8.5 g, Rfl: 1  •  Macitentan (OPSUMIT) 10 MG Tab, Take  by mouth., Disp: , Rfl:   •  tadalafil (CIALIS) 20 MG tablet, , Disp: , Rfl:   •  Cyanocobalamin (VITAMIN B-12) 1000 MCG Tab, Take 1,000 mcg by mouth., Disp: , Rfl:   •  digoxin (LANOXIN) 125 MCG Tab, TAKE 1 TABLET BY MOUTH  DAILY., Disp: , Rfl:   •  DILTIAZem (CARDIZEM) 30 MG Tab, Take 1 tablet by mouth 4  times daily., Disp: , Rfl:   •  Cholecalciferol (VITAMIN D3) 1000 units Cap, Take 1,000 Units by mouth., Disp: , Rfl:   •  Ascorbic Acid (VITAMIN C) 1000 MG Tab, Take 1,000 mg by mouth., Disp: , Rfl:   •  CALCIUM PO, Take 500 mg by mouth., Disp: , Rfl:   •  VIRT-GILLIAN 2.5-25-1 MG Tab, TAKE 1 TABLET BY MOUTH  EVERY DAY, Disp: 90 Tab, Rfl: 1  •  betamethasone dipropionate (DIPROLENE) 0.05 % Cream, Apply topically as needed, Disp: , Rfl:   •  Dermatological Products, Misc. (EPICERAM) Emulsion, Apply  to affected area(s)., Disp: , Rfl:   •  MAGNESIUM-ZINC  PO, Take  by mouth., Disp: , Rfl:   •  budesonide (RINOCORT AQUA) 32 MCG/ACT nasal spray, Spray 2 Sprays in nose every day. (Patient not taking: Reported on 8/21/2019), Disp: 1 Bottle, Rfl: 2  •  Ferrous Sulfate (IRON) 325 (65 Fe) MG Tab, Take 1 Tab by mouth every day., Disp: 30 Tab, Rfl: 2  •  fluticasone (FLONASE) 50 MCG/ACT nasal spray, Spray 1 Spray in nose every day., Disp: 16 g, Rfl: 6  •  sulfaSALAzine (AZULFIDINE) 500 MG Tab, Take 500 mg by mouth 3 times a day., Disp: , Rfl:   •  gabapentin (NEURONTIN) 800 MG tablet, , Disp: , Rfl:   •  triamcinolone acetonide (KENALOG) 0.1 % Cream, , Disp: , Rfl:   •  ANORO ELLIPTA 62.5-25 MCG/INH AEROSOL POWDER, BREATH ACTIVATED, , Disp: , Rfl:   •  NON SPECIFIED, Rx continuous O2 at 2L n/c New O2 concentrator Lowest O2 sat 82% SOB without use of O2, Disp: 1 Each, Rfl: 11  •  NON SPECIFIED, Rx continuous O2 at 2L n/c  New O2 concentrator Lowest O2 sat 82%, Disp: 1 Device, Rfl: 6  •  calcium carbonate (OS-LUIS ARMANDO 500) 500 MG Tab, Take 1,000 mg by mouth 2 times a day, with meals., Disp: , Rfl:   •  furosemide (LASIX) 40 MG Tab, Take 40 mg by mouth 3 times a day., Disp: , Rfl:   •  hydroxychloroquine (PLAQUENIL) 200 MG Tab, Take 200 mg by mouth 2 times a day., Disp: , Rfl:   •  leflunomide (ARAVA) 20 MG Tab, Take 20 mg by mouth every day., Disp: , Rfl:   •  pantoprazole (PROTONIX) 40 MG Tablet Delayed Response, Take 40 mg by mouth 2 times a day., Disp: , Rfl:   •  tramadol (ULTRAM) 50 MG Tab, Take 50 mg by mouth every four hours as needed., Disp: , Rfl:   •  Treprostinil (TYVASO INH), Inhale 1.74 mg by mouth. Inhale 12 puffs orally four times daily   Indications: 1.74mg/2.9ml, Disp: , Rfl:     Allergies, past medical history, past surgical history, family history, social history reviewed and updated    Objective:     Vitals: /87 (BP Location: Right arm, Patient Position: Sitting, BP Cuff Size: Adult)   Pulse 74   Temp 37.1 °C (98.8 °F) (Temporal)   Resp 18   Ht 1.702 m  "(5' 7\")   Wt 68.5 kg (151 lb)   SpO2 95%   BMI 23.65 kg/m²   General: Alert, pleasant, NAD  EYES:   PERRL, EOMI, no icterus or pallor.  Conjunctivae and lids normal.   HENT:  Normocephalic.  External ears normal.  No cerumen in the right ear canal.  Left ear canal is very narrow in its very difficult to see in the canal.  There does appear to be dry patches of skin or eczema near the ear canal both ears.  Neck supple.    Respiratory: Patient is on nasal cannula O2  Abdomen: Not obese  Skin: Warm, dry, no rashes.  Musculoskeletal: Gait is normal.  Moves all extremities well.  Extremities: No leg edema.  No swelling of lower extremities.  No signs of erythema or swelling of toes-no sign of gout.  Neurological: No tremors, sensation grossly intact, CN2-12 intact.  Psych:  Affect/mood is normal, judgement is good, memory is intact, grooming is appropriate.    Assessment/Plan:     Kaylynn was seen today for other.    Diagnoses and all orders for this visit:    Tinnitus of both ears  -     REFERRAL TO ENT  It is difficult to see in the left ear canal with her previous history of surgery.  As she is seen ENT in the past, and it is difficult to evaluate, I recommend a referral to ENT at this time.  Patient is seeing Dr. Sotelo in the past.    Rash  Looks like eczema is present in the ears.  She has a cream at home that she uses that is been given to her.  She would like to continue with this.  Instructed her to use a very small amount in the affected areas of her ears.    Neuropathy (HCC)  -     methylPREDNISolone (MEDROL DOSEPAK) 4 MG Tablet Therapy Pack; Take as directed    We will try a Medrol Dosepak.  If no improvement, patient will contact clinic and we will discuss further.  Patient also had questions of swelling.  I have tried to answer to use to the best of my ability.  Patient indicates that although her feet do not appear swollen, they will feel swollen to her even with the slightest adjustment of sodium.  I " believe this is most likely due to an electrolyte imbalance and she may be hypersensitive.  Again we can see if steroid helps with swelling and proceed from this point.    Abdominal discomfort generalized  Not improved.  However patient has met with GI and they are currently scheduling her for a scope.    This was a 25 to 30-minute appointment with greater than 50% spent in education and counseling.      No follow-ups on file.    Please note that this dictation was created using voice recognition software. I have made every reasonable attempt to correct obvious errors, but expect that there are errors of grammar and possible content that I did not discover before finalizing note.

## 2019-09-12 ENCOUNTER — TELEPHONE (OUTPATIENT)
Dept: MEDICAL GROUP | Facility: CLINIC | Age: 69
End: 2019-09-12

## 2019-10-16 DIAGNOSIS — F41.9 ANXIETY: ICD-10-CM

## 2019-10-16 RX ORDER — TEMAZEPAM 30 MG/1
30 CAPSULE ORAL NIGHTLY PRN
Qty: 90 CAP | Refills: 1 | OUTPATIENT
Start: 2019-10-16 | End: 2019-12-08 | Stop reason: SDUPTHER

## 2019-10-16 NOTE — TELEPHONE ENCOUNTER
Was the patient seen in the last year in this department? Yes    Does patient have an active prescription for medications requested? No     Received Request Via: Pharmacy     Requested Prescriptions     Pending Prescriptions Disp Refills   • temazepam (RESTORIL) 30 MG capsule 90 Cap 1     Sig: Take 1 Cap by mouth at bedtime as needed for Sleep for up to 180 days.

## 2019-10-30 ENCOUNTER — TELEPHONE (OUTPATIENT)
Dept: MEDICAL GROUP | Facility: CLINIC | Age: 69
End: 2019-10-30

## 2019-10-30 DIAGNOSIS — I47.10 SVT (SUPRAVENTRICULAR TACHYCARDIA): ICD-10-CM

## 2019-10-30 NOTE — TELEPHONE ENCOUNTER
"Please see lab results in media for 10/11/2019.   \"They\" told the patient she was anemic again. She would like to know what we are going to do about that.    Please advise.  "

## 2019-10-31 ENCOUNTER — TELEMEDICINE2 (OUTPATIENT)
Dept: MEDICAL GROUP | Age: 69
End: 2019-10-31
Payer: COMMERCIAL

## 2019-10-31 ENCOUNTER — TELEMEDICINE ORIGINATING SITE VISIT (OUTPATIENT)
Dept: MEDICAL GROUP | Facility: CLINIC | Age: 69
End: 2019-10-31
Payer: COMMERCIAL

## 2019-10-31 VITALS
OXYGEN SATURATION: 88 % | DIASTOLIC BLOOD PRESSURE: 74 MMHG | HEART RATE: 94 BPM | BODY MASS INDEX: 25.11 KG/M2 | SYSTOLIC BLOOD PRESSURE: 125 MMHG | TEMPERATURE: 99.3 F | HEIGHT: 67 IN | WEIGHT: 160 LBS | RESPIRATION RATE: 18 BRPM

## 2019-10-31 DIAGNOSIS — M25.512 CHRONIC LEFT SHOULDER PAIN: ICD-10-CM

## 2019-10-31 DIAGNOSIS — Z79.899 MEDICATION MANAGEMENT: ICD-10-CM

## 2019-10-31 DIAGNOSIS — R10.13 EPIGASTRIC PAIN: ICD-10-CM

## 2019-10-31 DIAGNOSIS — G89.29 CHRONIC LEFT SHOULDER PAIN: ICD-10-CM

## 2019-10-31 DIAGNOSIS — R13.10 DYSPHAGIA, UNSPECIFIED TYPE: ICD-10-CM

## 2019-10-31 DIAGNOSIS — D50.8 OTHER IRON DEFICIENCY ANEMIA: ICD-10-CM

## 2019-10-31 DIAGNOSIS — K21.00 GASTROESOPHAGEAL REFLUX DISEASE WITH ESOPHAGITIS: ICD-10-CM

## 2019-10-31 DIAGNOSIS — K21.9 GASTROESOPHAGEAL REFLUX DISEASE WITHOUT ESOPHAGITIS: ICD-10-CM

## 2019-10-31 DIAGNOSIS — E03.8 OTHER SPECIFIED HYPOTHYROIDISM: ICD-10-CM

## 2019-10-31 DIAGNOSIS — D64.9 LOW HEMOGLOBIN: ICD-10-CM

## 2019-10-31 DIAGNOSIS — R10.84 ABDOMINAL DISCOMFORT, GENERALIZED: ICD-10-CM

## 2019-10-31 DIAGNOSIS — E03.4 HYPOTHYROIDISM DUE TO ACQUIRED ATROPHY OF THYROID: ICD-10-CM

## 2019-10-31 PROCEDURE — 99214 OFFICE O/P EST MOD 30 MIN: CPT | Performed by: INTERNAL MEDICINE

## 2019-10-31 RX ORDER — PANTOPRAZOLE SODIUM 40 MG/1
40 TABLET, DELAYED RELEASE ORAL DAILY
Qty: 90 TAB | Refills: 2 | Status: SHIPPED | OUTPATIENT
Start: 2019-10-31 | End: 2020-02-05 | Stop reason: SDUPTHER

## 2019-10-31 RX ORDER — FAMOTIDINE 40 MG/1
40 TABLET, FILM COATED ORAL 2 TIMES DAILY
Qty: 180 TAB | Refills: 2 | Status: SHIPPED | OUTPATIENT
Start: 2019-10-31

## 2019-10-31 RX ORDER — LEVOTHYROXINE SODIUM 0.15 MG/1
150 TABLET ORAL
Qty: 90 TAB | Refills: 1 | Status: SHIPPED | OUTPATIENT
Start: 2019-10-31

## 2019-10-31 NOTE — PROGRESS NOTES
CC: One month follow-up visit.    Verified identification with patient. Secured video conference with RN presenter in [facility]      HPI:   Kaylynn presents today with the following.    1. Abdominal discomfort, generalized  Patient completed an EGD per GI.  Pathology results show iron pill gastritis.  Patient does not have follow-up with GI.  Symptoms are controlled with Pepcid and Protonix and is requesting refills.    2. Hypothyroidism due to acquired atrophy of thyroid  Most recent TSH level in January was 9.56 and patient has increased her Synthroid dose to 150 MCG's daily.  Patient requesting refills on Synthroid.    3. Other iron deficiency anemia  Patient continues to take ferrous sulfate 325 mg tablets but has increased to 2 tablets daily in the last 3 weeks because she was feeling tired.  Patient also is taking vitamin B12 and folic acid supplements.  Hemoglobin level 10.9 last week.  Hemoglobin level was 12.2 in August, 10.6 in April.  Patient has not changed any of her medications per rheumatology.    4. Chronic left shoulder pain  Patient requesting referral to [facility].  She has ongoing left shoulder pain for the last 4 months which is getting worse with stiffness, difficulty with overhead movements and occasional numbness and tingling down to her fingers.        Patient Active Problem List    Diagnosis Date Noted   • Chronic left shoulder pain 10/31/2019   • Tinnitus of both ears 09/04/2019   • Rash 09/04/2019   • Epigastric pain 08/21/2019   • Recurrent UTI (urinary tract infection) 05/29/2019   • Subacute maxillary sinusitis 05/15/2019   • Skin lesion of back 05/15/2019   • Pulmonary arterial hypertension (HCC) 04/24/2019   • History of deep venous thrombosis 04/24/2019   • History of pulmonary embolism 04/24/2019   • Chronic respiratory failure with hypoxia ([facility]) 04/24/2019   • Anticoagulation adequate 04/24/2019   • SVT (supraventricular tachycardia) ([facility]) 04/24/2019   • Palpitations  04/24/2019   • Anticoagulated 04/24/2019   • Low hemoglobin 10/17/2018   • Renal insufficiency 10/17/2018   • Chronic right shoulder pain 02/27/2018   • Gastroesophageal reflux disease without esophagitis 02/27/2018   • Spinal stenosis of lumbar region with neurogenic claudication 12/28/2017   • Medication management 12/28/2017   • Abdominal discomfort, generalized 08/23/2017   • Hyperglycemia 05/03/2017   • SOB (shortness of breath) 04/05/2017   • Acute bronchitis due to Haemophilus influenzae 04/05/2017   • Lumbar discogenic pain syndrome 07/27/2016   • Dysphagia 07/27/2016   • Pulmonary hypertension (HCC) 06/13/2016   • Rheumatoid arthritis involving multiple sites with positive rheumatoid factor (HCC) 06/13/2016   • Hypertension 06/13/2016   • Neuropathy (HCC) 06/13/2016   • Iron (Fe) deficiency anemia 06/13/2016   • Vitamin B12 deficiency 06/13/2016   • Hypothyroidism due to acquired atrophy of thyroid 06/13/2016   • Pulmonary embolism (HCC) 06/13/2016   • Multiple thyroid nodules 06/13/2016   • DVT of popliteal vein (HCC) 10/09/2013   • History of renal insufficiency syndrome 10/09/2013       Current Outpatient Medications   Medication Sig Dispense Refill   • famotidine (PEPCID) 40 MG Tab Take 1 Tab by mouth 2 times a day. 180 Tab 2   • levothyroxine (SYNTHROID) 150 MCG Tab Take 1 Tab by mouth Every morning on an empty stomach. 90 Tab 1   • rivaroxaban (XARELTO) 15 MG Tab tablet Take 1 Tab by mouth with dinner. 90 Tab 2   • temazepam (RESTORIL) 30 MG capsule Take 1 Cap by mouth at bedtime as needed for Sleep for up to 180 days. 90 Cap 1   • Influenza Vaccine Quad pf 0.5 ML Suspension Prefilled Syringe injection TO BE ADMINISTERED BY THE PHARMACIST FOR IMMUNIZATION     • ALPRAZolam (XANAX) 0.5 MG Tab Take 1 Tab by mouth at bedtime as needed for Sleep for up to 180 days. 90 Tab 1   • Folic Acid-Vit B6-Vit B12 (FOLBEE) 2.5-25-1 MG Tab Take 1 tablet by mouth every day. 90 Tab 1   • potassium chloride (KLOR-CON) 20  MEQ Pack Take 100 mEq by mouth 2 times a day.     • albuterol 108 (90 Base) MCG/ACT Aero Soln inhalation aerosol Inhale 2 Puffs by mouth every 6 hours as needed for Shortness of Breath. 8.5 g 1   • tadalafil (CIALIS) 20 MG tablet      • Cyanocobalamin (VITAMIN B-12) 1000 MCG Tab Take 1,000 mcg by mouth.     • digoxin (LANOXIN) 125 MCG Tab TAKE 1 TABLET BY MOUTH  DAILY.     • DILTIAZem (CARDIZEM) 30 MG Tab Take 1 tablet by mouth 4  times daily.     • Cholecalciferol (VITAMIN D3) 1000 units Cap Take 1,000 Units by mouth.     • Ascorbic Acid (VITAMIN C) 1000 MG Tab Take 1,000 mg by mouth.     • CALCIUM PO Take 500 mg by mouth.     • betamethasone dipropionate (DIPROLENE) 0.05 % Cream Apply topically as needed     • MAGNESIUM-ZINC PO Take  by mouth.     • Ferrous Sulfate (IRON) 325 (65 Fe) MG Tab Take 1 Tab by mouth every day. 30 Tab 2   • fluticasone (FLONASE) 50 MCG/ACT nasal spray Spray 1 Spray in nose every day. 16 g 6   • sulfaSALAzine (AZULFIDINE) 500 MG Tab Take 500 mg by mouth 3 times a day.     • gabapentin (NEURONTIN) 800 MG tablet      • NON SPECIFIED Rx continuous O2 at 2L n/c   New O2 concentrator  Lowest O2 sat 82% 1 Device 6   • calcium carbonate (OS-LUIS ARMANDO 500) 500 MG Tab Take 1,000 mg by mouth 2 times a day, with meals.     • furosemide (LASIX) 40 MG Tab Take 40 mg by mouth 3 times a day.     • hydroxychloroquine (PLAQUENIL) 200 MG Tab Take 200 mg by mouth 2 times a day.     • leflunomide (ARAVA) 20 MG Tab Take 20 mg by mouth every day.     • pantoprazole (PROTONIX) 40 MG Tablet Delayed Response Take 40 mg by mouth 2 times a day.     • methylPREDNISolone (MEDROL DOSEPAK) 4 MG Tablet Therapy Pack Take as directed (Patient not taking: Reported on 10/31/2019) 1 Kit 0   • gatifloxacin 0.5% (ZYMAXID) 0.5 % Solution      • Tadalafil, PAH, 20 MG Tab      • albuterol 108 (90 Base) MCG/ACT Aero Soln inhalation aerosol Inhale 2 Puffs by mouth.     • digoxin (LANOXIN) 125 MCG Tab TAKE 1 TABLET BY MOUTH  DAILY.     •  "DILTIAZem (CARDIZEM) 30 MG Tab Take 1 tablet by mouth 4  times daily.     • Treprostinil 0.6 MG/ML Solution Inhale 12-15 Puffs by mouth.     • XARELTO 15 MG Tab tablet Take 1 Tab by mouth with dinner. 90 Tab 3   • Macitentan (OPSUMIT) 10 MG Tab Take  by mouth.     • VIRT-GILLIAN 2.5-25-1 MG Tab TAKE 1 TABLET BY MOUTH  EVERY DAY 90 Tab 1   • Dermatological Products, Misc. (EPICERAM) Emulsion Apply  to affected area(s).     • budesonide (RINOCORT AQUA) 32 MCG/ACT nasal spray Spray 2 Sprays in nose every day. (Patient not taking: Reported on 8/21/2019) 1 Bottle 2   • triamcinolone acetonide (KENALOG) 0.1 % Cream      • ANORO ELLIPTA 62.5-25 MCG/INH AEROSOL POWDER, BREATH ACTIVATED      • NON SPECIFIED Rx continuous O2 at 2L n/c  New O2 concentrator  Lowest O2 sat 82%  SOB without use of O2 1 Each 11   • tramadol (ULTRAM) 50 MG Tab Take 50 mg by mouth every four hours as needed.     • Treprostinil (TYVASO INH) Inhale 1.74 mg by mouth. Inhale 12 puffs orally four times daily   Indications: 1.74mg/2.9ml       No current facility-administered medications for this visit.          Allergies as of 10/31/2019 - Reviewed 10/31/2019   Allergen Reaction Noted   • Latex Anaphylaxis and Rash 10/09/2013   • Erythrocin  05/01/2019   • Tape  06/13/2016   • Warfarin sodium Hives 06/13/2016   • Coumarin Hives and Rash 10/09/2013        ROS: As per HPI.  Denies all other cardiopulmonary, GI, neurologic symptoms.    /74 (BP Location: Right arm, Patient Position: Sitting, BP Cuff Size: Adult)   Pulse 94   Temp 37.4 °C (99.3 °F) (Temporal)   Resp 18   Ht 1.702 m (5' 7\")   Wt 72.6 kg (160 lb)   SpO2 88%   BMI 25.06 kg/m²     Physical Exam:  Gen:         Alert and oriented, No apparent distress.  Neck:        No Lymphadenopathy or Bruits.  Neck is supple.  No thyromegaly.  Lungs:     Decreased breath sounds throughout, no wheezing, no crackles noted.  CV:          Regular rate and rhythm. No murmurs, rubs or gallops.  Abd:         " Soft non tender, non distended. Normal active bowel sounds.  No  Hepatosplenomegaly, No pulsatile masses.                   Ext:          No clubbing, cyanosis, edema.  Musculoskeletal: Decreased range of motion of left shoulder girdle.    Assessment and Plan.   68 y.o. female with the following issues.    1. Abdominal discomfort, generalized  Continue with Pepcid and Protonix as prescribed by GI  EGD reports reviewed with patient  Recheck CBC with iron studies, B12 and folic acid.  Consider changing formulation of ferrous sulfate.    - famotidine (PEPCID) 40 MG Tab; Take 1 Tab by mouth 2 times a day.  Dispense: 180 Tab; Refill: 2    2. Hypothyroidism due to acquired atrophy of thyroid  Continue Synthroid 150 MCG's daily at this time  Recheck thyroid function panel and adjust medication as needed    - THYROID PANEL WITH TSH    3. Other iron deficiency anemia    - CBC WITH DIFFERENTIAL; Future  - IRON/TOTAL IRON BIND; Future  - TRANSFERRIN; Future  - FERRITIN; Future    4. Low hemoglobin    - CBC WITH DIFFERENTIAL; Future  - VITAMIN B12; Future  - FOLATE; Future    5. Chronic left shoulder pain  Patient will call him with the name of the orthopedist she she wishes to see in [place] [facility].    6. Epigastric pain    - famotidine (PEPCID) 40 MG Tab; Take 1 Tab by mouth 2 times a day.  Dispense: 180 Tab; Refill: 2    7. Gastroesophageal reflux disease without esophagitis    - famotidine (PEPCID) 40 MG Tab; Take 1 Tab by mouth 2 times a day.  Dispense: 180 Tab; Refill: 2    8. Dysphagia, unspecified type    - famotidine (PEPCID) 40 MG Tab; Take 1 Tab by mouth 2 times a day.  Dispense: 180 Tab; Refill: 2    9. Other specified hypothyroidism    - levothyroxine (SYNTHROID) 150 MCG Tab; Take 1 Tab by mouth Every morning on an empty stomach.  Dispense: 90 Tab; Refill: 1    10. Medication management    - rivaroxaban (XARELTO) 15 MG Tab tablet; Take 1 Tab by mouth with dinner.  Dispense: 90 Tab; Refill:  2            Please note that this dictation was created using voice recognition software. I have made every reasonable attempt to correct obvious errors, but expect that there are errors of grammar and possible content that I did not discover before finalizing note.

## 2019-11-01 ENCOUNTER — NON-PROVIDER VISIT (OUTPATIENT)
Dept: MEDICAL GROUP | Facility: CLINIC | Age: 69
End: 2019-11-01
Payer: COMMERCIAL

## 2019-11-01 DIAGNOSIS — D50.8 OTHER IRON DEFICIENCY ANEMIA: ICD-10-CM

## 2019-11-01 PROCEDURE — 36415 COLL VENOUS BLD VENIPUNCTURE: CPT | Performed by: FAMILY MEDICINE

## 2019-11-27 ENCOUNTER — TELEMEDICINE2 (OUTPATIENT)
Dept: URGENT CARE | Facility: CLINIC | Age: 69
End: 2019-11-27
Payer: COMMERCIAL

## 2019-11-27 ENCOUNTER — NON-PROVIDER VISIT (OUTPATIENT)
Dept: MEDICAL GROUP | Facility: CLINIC | Age: 69
End: 2019-11-27
Payer: COMMERCIAL

## 2019-11-27 VITALS
HEIGHT: 66 IN | DIASTOLIC BLOOD PRESSURE: 76 MMHG | SYSTOLIC BLOOD PRESSURE: 139 MMHG | BODY MASS INDEX: 25.07 KG/M2 | HEART RATE: 70 BPM | TEMPERATURE: 97.4 F | RESPIRATION RATE: 16 BRPM | WEIGHT: 156 LBS | OXYGEN SATURATION: 92 %

## 2019-11-27 DIAGNOSIS — R31.9 URINARY TRACT INFECTION WITH HEMATURIA, SITE UNSPECIFIED: ICD-10-CM

## 2019-11-27 DIAGNOSIS — R30.0 DYSURIA: ICD-10-CM

## 2019-11-27 DIAGNOSIS — N39.0 URINARY TRACT INFECTION WITH HEMATURIA, SITE UNSPECIFIED: ICD-10-CM

## 2019-11-27 LAB
APPEARANCE UR: NORMAL
BILIRUB UR STRIP-MCNC: NEGATIVE MG/DL
COLOR UR AUTO: NORMAL
GLUCOSE UR STRIP.AUTO-MCNC: NEGATIVE MG/DL
KETONES UR STRIP.AUTO-MCNC: NEGATIVE MG/DL
LEUKOCYTE ESTERASE UR QL STRIP.AUTO: NORMAL
NITRITE UR QL STRIP.AUTO: NEGATIVE
PH UR STRIP.AUTO: 7 [PH] (ref 5–8)
PROT UR QL STRIP: NORMAL MG/DL
RBC UR QL AUTO: NORMAL
SP GR UR STRIP.AUTO: 1.01
UROBILINOGEN UR STRIP-MCNC: 0.2 MG/DL

## 2019-11-27 PROCEDURE — 99215 OFFICE O/P EST HI 40 MIN: CPT | Performed by: NURSE PRACTITIONER

## 2019-11-27 PROCEDURE — 81002 URINALYSIS NONAUTO W/O SCOPE: CPT | Performed by: FAMILY MEDICINE

## 2019-11-27 RX ORDER — CEFDINIR 300 MG/1
300 CAPSULE ORAL EVERY 12 HOURS
Qty: 10 CAP | Refills: 0 | Status: SHIPPED | OUTPATIENT
Start: 2019-11-27 | End: 2019-12-02

## 2019-11-27 RX ORDER — DEXAMETHASONE 1.5 MG/1
TABLET ORAL
Refills: 0 | COMMUNITY
Start: 2019-11-10

## 2019-11-27 ASSESSMENT — ENCOUNTER SYMPTOMS
BACK PAIN: 1
FEVER: 0
WHEEZING: 0
CARDIOVASCULAR NEGATIVE: 1
ABDOMINAL PAIN: 0
EYES NEGATIVE: 1
CONSTIPATION: 0
CHILLS: 1
NAUSEA: 0
FLANK PAIN: 0
DIARRHEA: 0
VOMITING: 0
RESPIRATORY NEGATIVE: 1
SHORTNESS OF BREATH: 0

## 2019-11-27 ASSESSMENT — VISUAL ACUITY: OU: 1

## 2019-11-27 NOTE — NON-PROVIDER
Kaylynn Polo is a 69 y.o. female here for a non-provider visit for UA    If abnormal was an in office provider notified today (if so, indicate provider)? Yes  Routed to PCP? Yes

## 2019-11-27 NOTE — PROGRESS NOTES
"Subjective:   Kaylynn Polo is a 69 y.o. female who presents for UTI (Frequency, burning)        Dysuria    This is a new problem. The current episode started today. The problem occurs intermittently. The problem has been unchanged. The quality of the pain is described as burning and aching. Associated symptoms include chills and frequency. Pertinent negatives include no discharge, flank pain, hematuria, nausea, possible pregnancy or vomiting. She has tried nothing for the symptoms. The treatment provided no relief.        Review of Systems   Constitutional: Positive for chills. Negative for fever.   Eyes: Negative.    Respiratory: Negative.  Negative for shortness of breath and wheezing.    Cardiovascular: Negative.  Negative for chest pain.   Gastrointestinal: Negative for abdominal pain, constipation, diarrhea, nausea and vomiting.   Genitourinary: Positive for dysuria and frequency. Negative for flank pain and hematuria.   Musculoskeletal: Positive for back pain (States with back pain yesterday, but was lifting).   Skin: Negative.      Patient's PMH, SocHx, SurgHx, FamHx, Drug allergies and medications reviewed.     Objective:   /76 (BP Location: Right arm, Patient Position: Sitting, BP Cuff Size: Adult)   Pulse 70   Temp 36.3 °C (97.4 °F) (Temporal)   Resp 16   Ht 1.676 m (5' 6\")   Wt 70.8 kg (156 lb)   SpO2 92%   BMI 25.18 kg/m²   Physical Exam  Vitals signs reviewed.   Constitutional:       Appearance: Normal appearance.   HENT:      Head: Normocephalic.      Mouth/Throat:      Lips: Pink.   Eyes:      General: Lids are normal. Vision grossly intact.   Cardiovascular:      Rate and Rhythm: Normal rate.   Pulmonary:      Effort: Pulmonary effort is normal.   Abdominal:      General: There is no distension.      Palpations: Abdomen is soft. There is no mass.      Tenderness: There is tenderness in the suprapubic area. There is no guarding or rebound.      Comments: Nurse palpated abdomen in office "   Skin:     Findings: No rash.   Neurological:      Mental Status: She is alert and oriented to person, place, and time.   Psychiatric:         Mood and Affect: Mood normal.         Speech: Speech normal.         Behavior: Behavior normal. Behavior is cooperative.       Results for orders placed or performed in visit on 11/27/19   POCT Urinalysis   Result Value Ref Range    POC Color Dark Yellow Negative    POC Appearance Cloudy Negative    POC Leukocyte Esterase Moderate Negative    POC Nitrites Negative Negative    POC Urobiligen 0.2 Negative (0.2) mg/dL    POC Protein Trace Negative mg/dL    POC Urine PH 7.0 5.0 - 8.0    POC Blood Large Negative    POC Specific Gravity 1.015 <1.005 - >1.030    POC Ketones Negative Negative mg/dL    POC Bilirubin Negative Negative mg/dL    POC Glucose Negative Negative mg/dL           Assessment/Plan:   Assessment    1. Dysuria  - POCT Urinalysis    2. Urinary tract infection with hematuria, site unspecified  - cefdinir (OMNICEF) 300 MG Cap; Take 1 Cap by mouth every 12 hours for 5 days.  Dispense: 10 Cap; Refill: 0    Other orders  - dexamethasone (DECADRON) 1.5 MG Tab; Refill: 0    I have reviewed UA and sent prescription to pharmacy. Patient taking Xarelto and Digoxin and Cardizem. I have checked interaction  with Cefdinir and no potential interactions found between Cefdinir and medications listed.    Differential diagnosis, natural history, supportive care, and indications for immediate follow-up discussed.     **Please note that all invasive procedures during this visit were performed by myself and/or the Medical Assistant under the supervision of the PA or MD in office**

## 2019-12-03 DIAGNOSIS — F41.9 ANXIETY: ICD-10-CM

## 2019-12-03 NOTE — TELEPHONE ENCOUNTER
Was the patient seen in the last year in this department? Yes    Does patient have an active prescription for medications requested? Yes    Received Request Via: Pharmacy     Requested Prescriptions     Pending Prescriptions Disp Refills   • ALPRAZolam (XANAX) 0.5 MG Tab 90 Tab 1     Sig: Take 1 Tab by mouth at bedtime as needed for Sleep for up to 180 days.

## 2019-12-04 ENCOUNTER — TELEMEDICINE ORIGINATING SITE VISIT (OUTPATIENT)
Dept: MEDICAL GROUP | Facility: CLINIC | Age: 69
End: 2019-12-04
Payer: COMMERCIAL

## 2019-12-04 ENCOUNTER — TELEMEDICINE2 (OUTPATIENT)
Dept: MEDICAL GROUP | Age: 69
End: 2019-12-04
Payer: COMMERCIAL

## 2019-12-04 VITALS
OXYGEN SATURATION: 90 % | WEIGHT: 156 LBS | DIASTOLIC BLOOD PRESSURE: 75 MMHG | BODY MASS INDEX: 25.07 KG/M2 | HEART RATE: 83 BPM | SYSTOLIC BLOOD PRESSURE: 120 MMHG | RESPIRATION RATE: 18 BRPM | HEIGHT: 66 IN | TEMPERATURE: 99.3 F

## 2019-12-04 DIAGNOSIS — E03.4 HYPOTHYROIDISM DUE TO ACQUIRED ATROPHY OF THYROID: ICD-10-CM

## 2019-12-04 DIAGNOSIS — R10.84 ABDOMINAL DISCOMFORT, GENERALIZED: ICD-10-CM

## 2019-12-04 DIAGNOSIS — J96.11 CHRONIC RESPIRATORY FAILURE WITH HYPOXIA (HCC): ICD-10-CM

## 2019-12-04 DIAGNOSIS — F41.9 ANXIETY: ICD-10-CM

## 2019-12-04 DIAGNOSIS — D64.9 LOW HEMOGLOBIN: ICD-10-CM

## 2019-12-04 PROCEDURE — 99214 OFFICE O/P EST MOD 30 MIN: CPT | Performed by: INTERNAL MEDICINE

## 2019-12-04 RX ORDER — PREGABALIN 150 MG/1
CAPSULE ORAL
COMMUNITY
Start: 2019-11-18

## 2019-12-05 RX ORDER — ALPRAZOLAM 0.5 MG/1
0.5 TABLET ORAL NIGHTLY PRN
Qty: 90 TAB | Refills: 1 | OUTPATIENT
Start: 2019-12-05 | End: 2020-06-02

## 2019-12-06 ENCOUNTER — TELEPHONE (OUTPATIENT)
Dept: MEDICAL GROUP | Facility: CLINIC | Age: 69
End: 2019-12-06

## 2019-12-06 ENCOUNTER — NON-PROVIDER VISIT (OUTPATIENT)
Dept: MEDICAL GROUP | Facility: CLINIC | Age: 69
End: 2019-12-06
Payer: COMMERCIAL

## 2019-12-06 ENCOUNTER — TELEMEDICINE2 (OUTPATIENT)
Dept: CARDIOLOGY | Facility: MEDICAL CENTER | Age: 69
End: 2019-12-06
Payer: COMMERCIAL

## 2019-12-06 VITALS
HEIGHT: 66 IN | OXYGEN SATURATION: 90 % | TEMPERATURE: 99.2 F | RESPIRATION RATE: 16 BRPM | HEART RATE: 88 BPM | DIASTOLIC BLOOD PRESSURE: 76 MMHG | BODY MASS INDEX: 24.11 KG/M2 | WEIGHT: 150 LBS | SYSTOLIC BLOOD PRESSURE: 129 MMHG

## 2019-12-06 DIAGNOSIS — Z86.718 HISTORY OF DEEP VENOUS THROMBOSIS: ICD-10-CM

## 2019-12-06 DIAGNOSIS — Z86.711 HISTORY OF PULMONARY EMBOLISM: ICD-10-CM

## 2019-12-06 DIAGNOSIS — I27.21 PULMONARY ARTERIAL HYPERTENSION (HCC): ICD-10-CM

## 2019-12-06 DIAGNOSIS — I47.10 SVT (SUPRAVENTRICULAR TACHYCARDIA): ICD-10-CM

## 2019-12-06 PROCEDURE — 93000 ELECTROCARDIOGRAM COMPLETE: CPT | Performed by: FAMILY MEDICINE

## 2019-12-06 PROCEDURE — 99214 OFFICE O/P EST MOD 30 MIN: CPT | Performed by: INTERNAL MEDICINE

## 2019-12-06 ASSESSMENT — ENCOUNTER SYMPTOMS
SPUTUM PRODUCTION: 0
SHORTNESS OF BREATH: 1
WEIGHT LOSS: 0
NAUSEA: 0
BLOOD IN STOOL: 0
PALPITATIONS: 1

## 2019-12-06 NOTE — NON-PROVIDER
Kaylynn Polo is a 69 y.o. female here for a non-provider visit for EKG     If abnormal was an in office provider notified today (if so, indicate provider)? No  Routed to PCP? Yes

## 2019-12-06 NOTE — PROGRESS NOTES
Chief Complaint   Patient presents with   • Palpitations/PSVT     telemed follow up   Pulmonary HTN  Anticoagulation      Subjective:   Kaylynn Polo is a 69 y.o. female who presents today for f/u above issues    Less palpitation but HR increases with movement at night  One syncope without warning  Denies worsening dyspnea  On chronic O2  No recent medication change except gabapentin to Lyrica    Past Medical History:   Diagnosis Date   • Abdominal discomfort, generalized 8/23/2017   • Acute bronchitis due to Haemophilus influenzae 4/5/2017   • Anemia 6/13/2016   • Chronic left shoulder pain 10/31/2019   • Chronic right shoulder pain 2/27/2018   • Dysphagia 7/27/2016   • Epigastric pain 8/21/2019   • Gastroesophageal reflux disease without esophagitis 2/27/2018   • Hyperglycemia 5/3/2017   • Hypertension 6/13/2016   • Hypothyroidism due to acquired atrophy of thyroid 6/13/2016   • Iron (Fe) deficiency anemia 6/13/2016   • Low hemoglobin 10/17/2018   • Lumbar discogenic pain syndrome 7/27/2016   • Medication management 12/28/2017   • Multiple thyroid nodules 6/13/2016   • Neuropathy (HCC) 6/13/2016   • Pulmonary embolism (HCC) 6/13/2016   • Pulmonary hypertension (HCC) 6/13/2016   • Recurrent UTI (urinary tract infection) 5/29/2019   • Renal insufficiency 10/17/2018   • Rheumatoid arthritis (HCC) 6/13/2016   • Skin lesion of back 5/15/2019   • SOB (shortness of breath) 4/5/2017   • Spinal stenosis of lumbar region with neurogenic claudication 12/28/2017   • Subacute maxillary sinusitis 5/15/2019   • Vitamin B12 deficiency 6/13/2016     Past Surgical History:   Procedure Laterality Date   • PARATHYROIDECTOMY  2004   • SHOULDER ARTHROSCOPY  2003   • BLADDER SLING FEMALE  2002   • HYSTERECTOMY, TOTAL ABDOMINAL       Family History   Problem Relation Age of Onset   • Alcohol/Drug Father    • Heart Disease Father    • Heart Disease Mother    • Thyroid Mother    • Diabetes Mother      Social History     Socioeconomic History    • Marital status:      Spouse name: Not on file   • Number of children: Not on file   • Years of education: Not on file   • Highest education level: Not on file   Occupational History   • Not on file   Social Needs   • Financial resource strain: Not on file   • Food insecurity:     Worry: Not on file     Inability: Not on file   • Transportation needs:     Medical: Not on file     Non-medical: Not on file   Tobacco Use   • Smoking status: Never Smoker   • Smokeless tobacco: Never Used   Substance and Sexual Activity   • Alcohol use: No   • Drug use: No   • Sexual activity: Not on file   Lifestyle   • Physical activity:     Days per week: Not on file     Minutes per session: Not on file   • Stress: Not on file   Relationships   • Social connections:     Talks on phone: Not on file     Gets together: Not on file     Attends Bahai service: Not on file     Active member of club or organization: Not on file     Attends meetings of clubs or organizations: Not on file     Relationship status: Not on file   • Intimate partner violence:     Fear of current or ex partner: Not on file     Emotionally abused: Not on file     Physically abused: Not on file     Forced sexual activity: Not on file   Other Topics Concern   • Not on file   Social History Narrative   • Not on file     Allergies   Allergen Reactions   • Latex Anaphylaxis and Rash     Blisters on skin  Blisters on skin   • Erythrocin      Interacts with medication   • Tape      Blisters on skin   • Warfarin Sodium Hives   • Coumarin Hives and Rash     Outpatient Encounter Medications as of 12/6/2019   Medication Sig Dispense Refill   • ALPRAZolam (XANAX) 0.5 MG Tab Take 1 Tab by mouth at bedtime as needed for Sleep for up to 180 days. 90 Tab 1   • pregabalin (LYRICA) 150 MG Cap      • dexamethasone (DECADRON) 1.5 MG Tab   0   • famotidine (PEPCID) 40 MG Tab Take 1 Tab by mouth 2 times a day. 180 Tab 2   • levothyroxine (SYNTHROID) 150 MCG Tab Take 1 Tab by  mouth Every morning on an empty stomach. 90 Tab 1   • rivaroxaban (XARELTO) 15 MG Tab tablet Take 1 Tab by mouth with dinner. 90 Tab 2   • pantoprazole (PROTONIX) 40 MG Tablet Delayed Response Take 1 Tab by mouth every day. 90 Tab 2   • temazepam (RESTORIL) 30 MG capsule Take 1 Cap by mouth at bedtime as needed for Sleep for up to 180 days. 90 Cap 1   • albuterol 108 (90 Base) MCG/ACT Aero Soln inhalation aerosol Inhale 2 Puffs by mouth.     • Folic Acid-Vit B6-Vit B12 (FOLBEE) 2.5-25-1 MG Tab Take 1 tablet by mouth every day. 90 Tab 1   • potassium chloride (KLOR-CON) 20 MEQ Pack Take 100 mEq by mouth every day.     • albuterol 108 (90 Base) MCG/ACT Aero Soln inhalation aerosol Inhale 2 Puffs by mouth every 6 hours as needed for Shortness of Breath. 8.5 g 1   • Macitentan (OPSUMIT) 10 MG Tab Take  by mouth.     • Cyanocobalamin (VITAMIN B-12) 1000 MCG Tab Take 1,000 mcg by mouth.     • digoxin (LANOXIN) 125 MCG Tab TAKE 1 TABLET BY MOUTH  DAILY.     • DILTIAZem (CARDIZEM) 30 MG Tab Take 1 tablet by mouth 4  times daily.     • Cholecalciferol (VITAMIN D3) 1000 units Cap Take 1,000 Units by mouth.     • Ascorbic Acid (VITAMIN C) 1000 MG Tab Take 1,000 mg by mouth.     • CALCIUM PO Take 500 mg by mouth.     • betamethasone dipropionate (DIPROLENE) 0.05 % Cream Apply topically as needed     • MAGNESIUM-ZINC PO Take  by mouth.     • Ferrous Sulfate (IRON) 325 (65 Fe) MG Tab Take 1 Tab by mouth every day. 30 Tab 2   • sulfaSALAzine (AZULFIDINE) 500 MG Tab Take 500 mg by mouth. 1500 mg BID     • furosemide (LASIX) 40 MG Tab Take 40 mg by mouth 3 times a day.     • hydroxychloroquine (PLAQUENIL) 200 MG Tab Take 200 mg by mouth 2 times a day.     • leflunomide (ARAVA) 20 MG Tab Take 20 mg by mouth every day.     • tramadol (ULTRAM) 50 MG Tab Take 50 mg by mouth every four hours as needed.     • Treprostinil (TYVASO INH) Inhale 1.74 mg by mouth. Inhale 15 puffs orally four times daily  Indications: 1.74mg/2.9ml     •  Influenza Vaccine Quad pf 0.5 ML Suspension Prefilled Syringe injection TO BE ADMINISTERED BY THE PHARMACIST FOR IMMUNIZATION     • gatifloxacin 0.5% (ZYMAXID) 0.5 % Solution      • Tadalafil, PAH, 20 MG Tab      • digoxin (LANOXIN) 125 MCG Tab TAKE 1 TABLET BY MOUTH  DAILY.     • DILTIAZem (CARDIZEM) 30 MG Tab Take 1 tablet by mouth 4  times daily.     • Treprostinil 0.6 MG/ML Solution Inhale 12-15 Puffs by mouth.     • XARELTO 15 MG Tab tablet Take 1 Tab by mouth with dinner. 90 Tab 3   • tadalafil (CIALIS) 20 MG tablet      • VIRT-GILLIAN 2.5-25-1 MG Tab TAKE 1 TABLET BY MOUTH  EVERY DAY (Patient not taking: Reported on 12/6/2019) 90 Tab 1   • Dermatological Products, Misc. (EPICERAM) Emulsion Apply  to affected area(s).     • fluticasone (FLONASE) 50 MCG/ACT nasal spray Spray 1 Spray in nose every day. (Patient not taking: Reported on 12/6/2019) 16 g 6   • gabapentin (NEURONTIN) 800 MG tablet      • triamcinolone acetonide (KENALOG) 0.1 % Cream      • ANORO ELLIPTA 62.5-25 MCG/INH AEROSOL POWDER, BREATH ACTIVATED      • NON SPECIFIED Rx continuous O2 at 2L n/c  New O2 concentrator  Lowest O2 sat 82%  SOB without use of O2 1 Each 11   • NON SPECIFIED Rx continuous O2 at 2L n/c   New O2 concentrator  Lowest O2 sat 82% 1 Device 6   • calcium carbonate (OS-LUIS ARMANDO 500) 500 MG Tab Take 1,000 mg by mouth 2 times a day, with meals.     • pantoprazole (PROTONIX) 40 MG Tablet Delayed Response Take 40 mg by mouth 2 times a day.       No facility-administered encounter medications on file as of 12/6/2019.      Review of Systems   Constitutional: Negative for weight loss.   HENT: Negative for nosebleeds.    Respiratory: Positive for shortness of breath. Negative for sputum production.    Cardiovascular: Positive for palpitations.   Gastrointestinal: Negative for blood in stool and nausea.   Genitourinary: Negative for hematuria.        Objective:   /76 (BP Location: Right arm, Patient Position: Sitting)   Pulse 88   Temp  "37.3 °C (99.2 °F)   Resp 16   Ht 1.676 m (5' 6\")   Wt 68 kg (150 lb)   SpO2 90%   BMI 24.21 kg/m²     Physical Exam   Constitutional: She is oriented to person, place, and time.   On O2   HENT:   Head: Atraumatic.   Eyes: Left eye exhibits no discharge.   Neck: JVD present.   Cardiovascular:  Occasional extrasystoles are present. Exam reveals distant heart sounds. Exam reveals no gallop.   No murmur heard.  Increased P2   Pulmonary/Chest: No respiratory distress. She has decreased breath sounds. She has no rales.   tachypnic   Musculoskeletal:         General: No edema.   Neurological: She is alert and oriented to person, place, and time.   Skin:   Stasis change left lower leg   Psychiatric: She has a normal mood and affect.       Assessment:     1. SVT (supraventricular tachycardia) (HCC)     2. Pulmonary arterial hypertension (HCC)     3. History of pulmonary embolism     4. History of deep venous thrombosis         Medical Decision Making:  Today's Assessment / Status / Plan:     She has concern about increased heart rate with movement in the evening.  This is likely from sinus tachycardia from deconditioning but certainly cannot completely exclude some form of tachyarrhythmia.     She was advised to try taking extra dose of diltiazem at bedtime and monitor her blood pressure at home. If symptoms persist will consider repeating Holter monitoring.    We will see her back in a few months with follow-up.  We will be happy to see her sooner as needed.  Thank you for allowing us to dissipate in care of this patient.    "

## 2019-12-06 NOTE — LETTER
[facility] [place] [facility]   [address]  [place] [address]  Phone: [phone]  Fax: [phone]              Kaylynn Polo  1950    Encounter Date: 12/6/2019    Shelby Ferrer M.D.          PROGRESS NOTE:  Chief Complaint   Patient presents with   • Palpitations/PSVT     telemed follow up   Pulmonary HTN  Anticoagulation      Subjective:   Kaylynn Polo is a 69 y.o. female who presents today for f/u above issues    Less palpitation but HR increases with movement at night  One syncope without warning  Denies worsening dyspnea  On chronic O2  No recent medication change except gabapentin to Lyrica    Past Medical History:   Diagnosis Date   • Abdominal discomfort, generalized 8/23/2017   • Acute bronchitis due to Haemophilus influenzae 4/5/2017   • Anemia 6/13/2016   • Chronic left shoulder pain 10/31/2019   • Chronic right shoulder pain 2/27/2018   • Dysphagia 7/27/2016   • Epigastric pain 8/21/2019   • Gastroesophageal reflux disease without esophagitis 2/27/2018   • Hyperglycemia 5/3/2017   • Hypertension 6/13/2016   • Hypothyroidism due to acquired atrophy of thyroid 6/13/2016   • Iron (Fe) deficiency anemia 6/13/2016   • Low hemoglobin 10/17/2018   • Lumbar discogenic pain syndrome 7/27/2016   • Medication management 12/28/2017   • Multiple thyroid nodules 6/13/2016   • Neuropathy (HCC) 6/13/2016   • Pulmonary embolism (HCC) 6/13/2016   • Pulmonary hypertension (HCC) 6/13/2016   • Recurrent UTI (urinary tract infection) 5/29/2019   • Renal insufficiency 10/17/2018   • Rheumatoid arthritis (HCC) 6/13/2016   • Skin lesion of back 5/15/2019   • SOB (shortness of breath) 4/5/2017   • Spinal stenosis of lumbar region with neurogenic claudication 12/28/2017   • Subacute maxillary sinusitis 5/15/2019   • Vitamin B12 deficiency 6/13/2016     Past Surgical History:   Procedure Laterality Date   • PARATHYROIDECTOMY  2004   • SHOULDER ARTHROSCOPY  2003   • BLADDER SLING  FEMALE  2002   • HYSTERECTOMY, TOTAL ABDOMINAL       Family History   Problem Relation Age of Onset   • Alcohol/Drug Father    • Heart Disease Father    • Heart Disease Mother    • Thyroid Mother    • Diabetes Mother      Social History     Socioeconomic History   • Marital status:      Spouse name: Not on file   • Number of children: Not on file   • Years of education: Not on file   • Highest education level: Not on file   Occupational History   • Not on file   Social Needs   • Financial resource strain: Not on file   • Food insecurity:     Worry: Not on file     Inability: Not on file   • Transportation needs:     Medical: Not on file     Non-medical: Not on file   Tobacco Use   • Smoking status: Never Smoker   • Smokeless tobacco: Never Used   Substance and Sexual Activity   • Alcohol use: No   • Drug use: No   • Sexual activity: Not on file   Lifestyle   • Physical activity:     Days per week: Not on file     Minutes per session: Not on file   • Stress: Not on file   Relationships   • Social connections:     Talks on phone: Not on file     Gets together: Not on file     Attends [employer] service: Not on file     Active member of club or organization: Not on file     Attends meetings of clubs or organizations: Not on file     Relationship status: Not on file   • Intimate partner violence:     Fear of current or ex partner: Not on file     Emotionally abused: Not on file     Physically abused: Not on file     Forced sexual activity: Not on file   Other Topics Concern   • Not on file   Social History Narrative   • Not on file     Allergies   Allergen Reactions   • Latex Anaphylaxis and Rash     Blisters on skin  Blisters on skin   • Erythrocin      Interacts with medication   • Tape      Blisters on skin   • Warfarin Sodium Hives   • Coumarin Hives and Rash     Outpatient Encounter Medications as of 12/6/2019   Medication Sig Dispense Refill   • ALPRAZolam (XANAX) 0.5 MG Tab Take 1 Tab by mouth at bedtime as  needed for Sleep for up to 180 days. 90 Tab 1   • pregabalin (LYRICA) 150 MG Cap      • dexamethasone (DECADRON) 1.5 MG Tab   0   • famotidine (PEPCID) 40 MG Tab Take 1 Tab by mouth 2 times a day. 180 Tab 2   • levothyroxine (SYNTHROID) 150 MCG Tab Take 1 Tab by mouth Every morning on an empty stomach. 90 Tab 1   • rivaroxaban (XARELTO) 15 MG Tab tablet Take 1 Tab by mouth with dinner. 90 Tab 2   • pantoprazole (PROTONIX) 40 MG Tablet Delayed Response Take 1 Tab by mouth every day. 90 Tab 2   • temazepam (RESTORIL) 30 MG capsule Take 1 Cap by mouth at bedtime as needed for Sleep for up to 180 days. 90 Cap 1   • albuterol 108 (90 Base) MCG/ACT Aero Soln inhalation aerosol Inhale 2 Puffs by mouth.     • Folic Acid-Vit B6-Vit B12 (FOLBEE) 2.5-25-1 MG Tab Take 1 tablet by mouth every day. 90 Tab 1   • potassium chloride (KLOR-CON) 20 MEQ Pack Take 100 mEq by mouth every day.     • albuterol 108 (90 Base) MCG/ACT Aero Soln inhalation aerosol Inhale 2 Puffs by mouth every 6 hours as needed for Shortness of Breath. 8.5 g 1   • Macitentan (OPSUMIT) 10 MG Tab Take  by mouth.     • Cyanocobalamin (VITAMIN B-12) 1000 MCG Tab Take 1,000 mcg by mouth.     • digoxin (LANOXIN) 125 MCG Tab TAKE 1 TABLET BY MOUTH  DAILY.     • DILTIAZem (CARDIZEM) 30 MG Tab Take 1 tablet by mouth 4  times daily.     • Cholecalciferol (VITAMIN D3) 1000 units Cap Take 1,000 Units by mouth.     • Ascorbic Acid (VITAMIN C) 1000 MG Tab Take 1,000 mg by mouth.     • CALCIUM PO Take 500 mg by mouth.     • betamethasone dipropionate (DIPROLENE) 0.05 % Cream Apply topically as needed     • MAGNESIUM-ZINC PO Take  by mouth.     • Ferrous Sulfate (IRON) 325 (65 Fe) MG Tab Take 1 Tab by mouth every day. 30 Tab 2   • sulfaSALAzine (AZULFIDINE) 500 MG Tab Take 500 mg by mouth. 1500 mg BID     • furosemide (LASIX) 40 MG Tab Take 40 mg by mouth 3 times a day.     • hydroxychloroquine (PLAQUENIL) 200 MG Tab Take 200 mg by mouth 2 times a day.     • leflunomide  (ARAVA) 20 MG Tab Take 20 mg by mouth every day.     • tramadol (ULTRAM) 50 MG Tab Take 50 mg by mouth every four hours as needed.     • Treprostinil (TYVASO INH) Inhale 1.74 mg by mouth. Inhale 15 puffs orally four times daily  Indications: 1.74mg/2.9ml     • Influenza Vaccine Quad pf 0.5 ML Suspension Prefilled Syringe injection TO BE ADMINISTERED BY THE PHARMACIST FOR IMMUNIZATION     • gatifloxacin 0.5% (ZYMAXID) 0.5 % Solution      • Tadalafil, PAH, 20 MG Tab      • digoxin (LANOXIN) 125 MCG Tab TAKE 1 TABLET BY MOUTH  DAILY.     • DILTIAZem (CARDIZEM) 30 MG Tab Take 1 tablet by mouth 4  times daily.     • Treprostinil 0.6 MG/ML Solution Inhale 12-15 Puffs by mouth.     • XARELTO 15 MG Tab tablet Take 1 Tab by mouth with dinner. 90 Tab 3   • tadalafil (CIALIS) 20 MG tablet      • VIRT-GILLIAN 2.5-25-1 MG Tab TAKE 1 TABLET BY MOUTH  EVERY DAY (Patient not taking: Reported on 12/6/2019) 90 Tab 1   • Dermatological Products, Misc. (EPICERAM) Emulsion Apply  to affected area(s).     • fluticasone (FLONASE) 50 MCG/ACT nasal spray Spray 1 Spray in nose every day. (Patient not taking: Reported on 12/6/2019) 16 g 6   • gabapentin (NEURONTIN) 800 MG tablet      • triamcinolone acetonide (KENALOG) 0.1 % Cream      • ANORO ELLIPTA 62.5-25 MCG/INH AEROSOL POWDER, BREATH ACTIVATED      • NON SPECIFIED Rx continuous O2 at 2L n/c  New O2 concentrator  Lowest O2 sat 82%  SOB without use of O2 1 Each 11   • NON SPECIFIED Rx continuous O2 at 2L n/c   New O2 concentrator  Lowest O2 sat 82% 1 Device 6   • calcium carbonate (OS-LUIS ARMANDO 500) 500 MG Tab Take 1,000 mg by mouth 2 times a day, with meals.     • pantoprazole (PROTONIX) 40 MG Tablet Delayed Response Take 40 mg by mouth 2 times a day.       No facility-administered encounter medications on file as of 12/6/2019.      Review of Systems   Constitutional: Negative for weight loss.   HENT: Negative for nosebleeds.    Respiratory: Positive for shortness of breath. Negative for sputum  "production.    Cardiovascular: Positive for palpitations.   Gastrointestinal: Negative for blood in stool and nausea.   Genitourinary: Negative for hematuria.        Objective:   /76 (BP Location: Right arm, Patient Position: Sitting)   Pulse 88   Temp 37.3 °C (99.2 °F)   Resp 16   Ht 1.676 m (5' 6\")   Wt 68 kg (150 lb)   SpO2 90%   BMI 24.21 kg/m²      Physical Exam   Constitutional: She is oriented to person, place, and time.   On O2   HENT:   Head: Atraumatic.   Eyes: Left eye exhibits no discharge.   Neck: JVD present.   Cardiovascular:  Occasional extrasystoles are present. Exam reveals distant heart sounds. Exam reveals no gallop.   No murmur heard.  Increased P2   Pulmonary/Chest: No respiratory distress. She has decreased breath sounds. She has no rales.   tachypnic   Musculoskeletal:         General: No edema.   Neurological: She is alert and oriented to person, place, and time.   Skin:   Stasis change left lower leg   Psychiatric: She has a normal mood and affect.       Assessment:     1. SVT (supraventricular tachycardia) (HCC)     2. Pulmonary arterial hypertension (HCC)     3. History of pulmonary embolism     4. History of deep venous thrombosis         Medical Decision Making:  Today's Assessment / Status / Plan:     She has concern about increased heart rate with movement in the evening.  This is likely from sinus tachycardia from deconditioning but certainly cannot completely exclude some form of tachyarrhythmia.     She was advised to try taking extra dose of diltiazem at bedtime and monitor her blood pressure at home. If symptoms persist will consider repeating Holter monitoring.    We will see her back in a few months with follow-up.  We will be happy to see her sooner as needed.  Thank you for allowing us to dissipate in care of this patient.        No Recipients              "

## 2019-12-07 NOTE — TELEPHONE ENCOUNTER
Kaylynn stated that she was supposed to get a refer. For her left shoulder. I do not see one so can we get one ordered. Thank you

## 2019-12-08 RX ORDER — TEMAZEPAM 30 MG/1
30 CAPSULE ORAL NIGHTLY PRN
Qty: 90 CAP | Refills: 1 | OUTPATIENT
Start: 2019-12-08 | End: 2020-06-05

## 2019-12-09 ENCOUNTER — TELEMEDICINE ORIGINATING SITE VISIT (OUTPATIENT)
Dept: MEDICAL GROUP | Facility: CLINIC | Age: 69
End: 2019-12-09
Payer: COMMERCIAL

## 2019-12-09 DIAGNOSIS — Z86.711 HISTORY OF PULMONARY EMBOLISM: ICD-10-CM

## 2019-12-12 ENCOUNTER — TELEPHONE (OUTPATIENT)
Dept: MEDICAL GROUP | Facility: CLINIC | Age: 69
End: 2019-12-12

## 2019-12-12 NOTE — PROGRESS NOTES
CC: Follow-up lab work.    Verified identification with patient. Secured video conference with RN presenter in [facility]      HPI:   Kaylynn presents today with the following.    1. Hypothyroidism due to acquired atrophy of thyroid  TSH 5.38.  Patient taking levothyroxine 150 MCG's daily.  Patient has been feeling a bit tired.    2. Low hemoglobin  Long history of iron deficiency anemia.  Hemoglobin level 9.8 hematocrit level 30.  Patient's iron level is slightly elevated to 14, TIBC 329 within normal limits.  Slightly elevated percent saturation 65%.  Ferritin within normal limits 41.  Vitamin B12 greater than 2000.  .  Folate levels within normal limits.  Patient's hemoglobin level fluctuates and averages around 10.5.  Most recent blood transfusion was 2015.  BUN/creatinine within normal limits.  Colonoscopy up-to-date.  Recent EGD showed pill gastritis.  Patient completed a FOBT.  Patient discontinued her iron and vitamin B12 about 2 weeks ago.  Patient was taking iron 325 mg tablets daily.    3. Anxiety  Patient would like a refill on temazepam    4. Abdominal discomfort, generalized  Recent EGD was positive for iron pill gastritis.  Patient takes Pepcid but did not start her PPI.  No follow-up with GI at this time.      Patient Active Problem List    Diagnosis Date Noted   • Chronic left shoulder pain 10/31/2019   • Tinnitus of both ears 09/04/2019   • Rash 09/04/2019   • Epigastric pain 08/21/2019   • Recurrent UTI (urinary tract infection) 05/29/2019   • Subacute maxillary sinusitis 05/15/2019   • Skin lesion of back 05/15/2019   • Pulmonary arterial hypertension (HCC) 04/24/2019   • History of deep venous thrombosis 04/24/2019   • History of pulmonary embolism 04/24/2019   • Chronic respiratory failure with hypoxia (HCC) 04/24/2019   • Anticoagulation adequate 04/24/2019   • SVT (supraventricular tachycardia) ([facility]) 04/24/2019   • Palpitations 04/24/2019   • Anticoagulated 04/24/2019   • Low  hemoglobin 10/17/2018   • Renal insufficiency 10/17/2018   • Chronic right shoulder pain 02/27/2018   • Gastroesophageal reflux disease without esophagitis 02/27/2018   • Spinal stenosis of lumbar region with neurogenic claudication 12/28/2017   • Medication management 12/28/2017   • Abdominal discomfort, generalized 08/23/2017   • Hyperglycemia 05/03/2017   • SOB (shortness of breath) 04/05/2017   • Acute bronchitis due to Haemophilus influenzae 04/05/2017   • Lumbar discogenic pain syndrome 07/27/2016   • Dysphagia 07/27/2016   • Pulmonary hypertension (HCC) 06/13/2016   • Rheumatoid arthritis involving multiple sites with positive rheumatoid factor ([facility]) 06/13/2016   • Hypertension 06/13/2016   • Neuropathy ([facility]) 06/13/2016   • Iron (Fe) deficiency anemia 06/13/2016   • Vitamin B12 deficiency 06/13/2016   • Hypothyroidism due to acquired atrophy of thyroid 06/13/2016   • Pulmonary embolism ([facility]) 06/13/2016   • Multiple thyroid nodules 06/13/2016   • DVT of popliteal vein ([facility]) 10/09/2013   • History of renal insufficiency syndrome 10/09/2013       Current Outpatient Medications   Medication Sig Dispense Refill   • temazepam (RESTORIL) 30 MG capsule Take 1 Cap by mouth at bedtime as needed for Sleep for up to 180 days. 90 Cap 1   • ALPRAZolam (XANAX) 0.5 MG Tab Take 1 Tab by mouth at bedtime as needed for Sleep for up to 180 days. 90 Tab 1   • pregabalin (LYRICA) 150 MG Cap      • dexamethasone (DECADRON) 1.5 MG Tab   0   • famotidine (PEPCID) 40 MG Tab Take 1 Tab by mouth 2 times a day. 180 Tab 2   • levothyroxine (SYNTHROID) 150 MCG Tab Take 1 Tab by mouth Every morning on an empty stomach. 90 Tab 1   • rivaroxaban (XARELTO) 15 MG Tab tablet Take 1 Tab by mouth with dinner. 90 Tab 2   • pantoprazole (PROTONIX) 40 MG Tablet Delayed Response Take 1 Tab by mouth every day. 90 Tab 2   • Influenza Vaccine Quad pf 0.5 ML Suspension Prefilled Syringe injection TO BE ADMINISTERED BY THE PHARMACIST FOR IMMUNIZATION     •  gatifloxacin 0.5% (ZYMAXID) 0.5 % Solution      • Tadalafil, PAH, 20 MG Tab      • albuterol 108 (90 Base) MCG/ACT Aero Soln inhalation aerosol Inhale 2 Puffs by mouth.     • digoxin (LANOXIN) 125 MCG Tab TAKE 1 TABLET BY MOUTH  DAILY.     • DILTIAZem (CARDIZEM) 30 MG Tab Take 1 tablet by mouth 4  times daily.     • Treprostinil 0.6 MG/ML Solution Inhale 12-15 Puffs by mouth.     • Folic Acid-Vit B6-Vit B12 (FOLBEE) 2.5-25-1 MG Tab Take 1 tablet by mouth every day. 90 Tab 1   • XARELTO 15 MG Tab tablet Take 1 Tab by mouth with dinner. 90 Tab 3   • potassium chloride (KLOR-CON) 20 MEQ Pack Take 100 mEq by mouth every day.     • albuterol 108 (90 Base) MCG/ACT Aero Soln inhalation aerosol Inhale 2 Puffs by mouth every 6 hours as needed for Shortness of Breath. 8.5 g 1   • Macitentan (OPSUMIT) 10 MG Tab Take  by mouth.     • tadalafil (CIALIS) 20 MG tablet      • Cyanocobalamin (VITAMIN B-12) 1000 MCG Tab Take 1,000 mcg by mouth.     • digoxin (LANOXIN) 125 MCG Tab TAKE 1 TABLET BY MOUTH  DAILY.     • DILTIAZem (CARDIZEM) 30 MG Tab Take 1 tablet by mouth 4  times daily.     • Cholecalciferol (VITAMIN D3) 1000 units Cap Take 1,000 Units by mouth.     • Ascorbic Acid (VITAMIN C) 1000 MG Tab Take 1,000 mg by mouth.     • CALCIUM PO Take 500 mg by mouth.     • VIRT-GILLIAN 2.5-25-1 MG Tab TAKE 1 TABLET BY MOUTH  EVERY DAY (Patient not taking: Reported on 12/6/2019) 90 Tab 1   • betamethasone dipropionate (DIPROLENE) 0.05 % Cream Apply topically as needed     • Dermatological Products, Misc. (EPICERAM) Emulsion Apply  to affected area(s).     • MAGNESIUM-ZINC PO Take  by mouth.     • Ferrous Sulfate (IRON) 325 (65 Fe) MG Tab Take 1 Tab by mouth every day. 30 Tab 2   • fluticasone (FLONASE) 50 MCG/ACT nasal spray Spray 1 Spray in nose every day. (Patient not taking: Reported on 12/6/2019) 16 g 6   • sulfaSALAzine (AZULFIDINE) 500 MG Tab Take 500 mg by mouth. 1500 mg BID     • gabapentin (NEURONTIN) 800 MG tablet      •  "triamcinolone acetonide (KENALOG) 0.1 % Cream      • ANORO ELLIPTA 62.5-25 MCG/INH AEROSOL POWDER, BREATH ACTIVATED      • NON SPECIFIED Rx continuous O2 at 2L n/c  New O2 concentrator  Lowest O2 sat 82%  SOB without use of O2 1 Each 11   • NON SPECIFIED Rx continuous O2 at 2L n/c   New O2 concentrator  Lowest O2 sat 82% 1 Device 6   • calcium carbonate (OS-LUIS ARMANDO 500) 500 MG Tab Take 1,000 mg by mouth 2 times a day, with meals.     • furosemide (LASIX) 40 MG Tab Take 40 mg by mouth 3 times a day.     • hydroxychloroquine (PLAQUENIL) 200 MG Tab Take 200 mg by mouth 2 times a day.     • leflunomide (ARAVA) 20 MG Tab Take 20 mg by mouth every day.     • pantoprazole (PROTONIX) 40 MG Tablet Delayed Response Take 40 mg by mouth 2 times a day.     • tramadol (ULTRAM) 50 MG Tab Take 50 mg by mouth every four hours as needed.     • Treprostinil (TYVASO INH) Inhale 1.74 mg by mouth. Inhale 15 puffs orally four times daily  Indications: 1.74mg/2.9ml       No current facility-administered medications for this visit.          Allergies as of 12/04/2019 - Reviewed 12/04/2019   Allergen Reaction Noted   • Latex Anaphylaxis and Rash 10/09/2013   • Erythrocin  05/01/2019   • Tape  06/13/2016   • Warfarin sodium Hives 06/13/2016   • Coumarin Hives and Rash 10/09/2013        ROS: As per HPI.  Mild shortness of breath.  Shoulder pain.  Denies all other cardiopulmonary, GI, neurologic symptoms.    /75 (BP Location: Right arm, Patient Position: Sitting, BP Cuff Size: Adult)   Pulse 83   Temp 37.4 °C (99.3 °F) (Temporal)   Resp 18   Ht 1.676 m (5' 6\")   Wt 70.8 kg (156 lb)   SpO2 90%   BMI 25.18 kg/m²     Physical Exam:  Gen:         Alert and oriented, No apparent distress.  No physical exam today      Assessment and Plan.   69 y.o. female with the following issues.    1. Hypothyroidism due to acquired atrophy of thyroid  Patient will take levothyroxine 150 MCG's 6 days a week, 1-1/2 tablets 1 day a week.  Patient also has " 137 MCG tablets available.    Recheck thyroid function panel in 2 months    2. Low hemoglobin  All labs reviewed dating back to 2017.  Anemia possibly of chronic disease given diagnosis of rheumatoid arthritis.  Check results for FOBT  Continue to monitor CBC, iron studies  May resume iron supplementation if wishes.    3. Anxiety    - temazepam (RESTORIL) 30 MG capsule; Take 1 Cap by mouth at bedtime as needed for Sleep for up to 180 days.  Dispense: 90 Cap; Refill: 1    4. Abdominal discomfort, generalized  EGD reports reviewed  Stable, continue current plan of care per GI.    Total visit time, 25 minutes, time spent in lab review and coordination of medical care.        Please note that this dictation was created using voice recognition software. I have made every reasonable attempt to correct obvious errors, but expect that there are errors of grammar and possible content that I did not discover before finalizing note.

## 2019-12-17 DIAGNOSIS — M19.012 ARTHRITIS OF LEFT SHOULDER REGION: ICD-10-CM

## 2020-01-13 ENCOUNTER — TELEPHONE (OUTPATIENT)
Dept: MEDICAL GROUP | Facility: CLINIC | Age: 70
End: 2020-01-13

## 2020-01-13 NOTE — TELEPHONE ENCOUNTER
Pt phoned clinic wanting to have lab tests added on to her venipuncture tomorrow am.  Pt was seen in [facility] over the weekend for blood in her urine, with increased frequency of urination and burning.  Pt was placed on an antibiotic originally.  Pt states the UC discontinued the antibiotic, stating she did not have an infection as the urine culture came back negative.  Pt is still having symptoms and would like to have her kidney function looked at and possibly another urine sent.    Pt also informed me that on last Thursday, 1/9/2020, she traveled to [place].  Once she got into the city she stopped at a store to use the restroom.  On her way into the store she became very weak and lightheaded.  She was assisted to a bench by two bystanders.  Denies full loss of consciousness.  After sitting for a while she was feeling better and was able to carry on with her day.  This came out of the blue with no other symptoms.

## 2020-01-14 ENCOUNTER — NON-PROVIDER VISIT (OUTPATIENT)
Dept: MEDICAL GROUP | Facility: CLINIC | Age: 70
End: 2020-01-14
Payer: COMMERCIAL

## 2020-01-14 DIAGNOSIS — Z79.01 ANTICOAGULATION ADEQUATE: ICD-10-CM

## 2020-01-14 PROCEDURE — 36415 COLL VENOUS BLD VENIPUNCTURE: CPT | Performed by: INTERNAL MEDICINE

## 2020-01-14 PROCEDURE — 99000 SPECIMEN HANDLING OFFICE-LAB: CPT | Performed by: INTERNAL MEDICINE

## 2020-01-16 ENCOUNTER — TELEMEDICINE ORIGINATING SITE VISIT (OUTPATIENT)
Dept: MEDICAL GROUP | Facility: CLINIC | Age: 70
End: 2020-01-16
Payer: COMMERCIAL

## 2020-01-16 ENCOUNTER — TELEMEDICINE2 (OUTPATIENT)
Dept: MEDICAL GROUP | Age: 70
End: 2020-01-16
Payer: COMMERCIAL

## 2020-01-16 VITALS
DIASTOLIC BLOOD PRESSURE: 76 MMHG | WEIGHT: 160 LBS | HEART RATE: 79 BPM | SYSTOLIC BLOOD PRESSURE: 134 MMHG | HEIGHT: 67 IN | TEMPERATURE: 98.8 F | OXYGEN SATURATION: 91 % | BODY MASS INDEX: 25.11 KG/M2 | RESPIRATION RATE: 18 BRPM

## 2020-01-16 DIAGNOSIS — Z87.448 HISTORY OF RENAL INSUFFICIENCY SYNDROME: ICD-10-CM

## 2020-01-16 DIAGNOSIS — R30.0 DYSURIA: ICD-10-CM

## 2020-01-16 DIAGNOSIS — F41.9 ANXIETY: ICD-10-CM

## 2020-01-16 DIAGNOSIS — G47.9 SLEEP DISORDER: ICD-10-CM

## 2020-01-16 PROCEDURE — 99214 OFFICE O/P EST MOD 30 MIN: CPT | Performed by: INTERNAL MEDICINE

## 2020-01-16 RX ORDER — ALPRAZOLAM 0.5 MG/1
0.5 TABLET ORAL NIGHTLY PRN
Qty: 30 TAB | Refills: 2 | OUTPATIENT
Start: 2020-01-16 | End: 2020-04-15

## 2020-01-22 ENCOUNTER — TELEMEDICINE ORIGINATING SITE VISIT (OUTPATIENT)
Dept: MEDICAL GROUP | Facility: CLINIC | Age: 70
End: 2020-01-22
Payer: COMMERCIAL

## 2020-01-22 ENCOUNTER — TELEMEDICINE2 (OUTPATIENT)
Dept: MEDICAL GROUP | Age: 70
End: 2020-01-22
Payer: COMMERCIAL

## 2020-01-22 ENCOUNTER — NON-PROVIDER VISIT (OUTPATIENT)
Dept: MEDICAL GROUP | Facility: CLINIC | Age: 70
End: 2020-01-22
Payer: COMMERCIAL

## 2020-01-22 VITALS
RESPIRATION RATE: 16 BRPM | WEIGHT: 157 LBS | HEART RATE: 80 BPM | SYSTOLIC BLOOD PRESSURE: 150 MMHG | HEIGHT: 66 IN | BODY MASS INDEX: 25.23 KG/M2 | TEMPERATURE: 98.6 F | OXYGEN SATURATION: 92 % | DIASTOLIC BLOOD PRESSURE: 92 MMHG

## 2020-01-22 DIAGNOSIS — R30.0 DYSURIA: ICD-10-CM

## 2020-01-22 DIAGNOSIS — E03.4 HYPOTHYROIDISM DUE TO ACQUIRED ATROPHY OF THYROID: ICD-10-CM

## 2020-01-22 DIAGNOSIS — D50.8 OTHER IRON DEFICIENCY ANEMIA: ICD-10-CM

## 2020-01-22 DIAGNOSIS — R10.84 ABDOMINAL DISCOMFORT, GENERALIZED: ICD-10-CM

## 2020-01-22 DIAGNOSIS — G47.9 SLEEP DISORDER: ICD-10-CM

## 2020-01-22 LAB
APPEARANCE UR: CLEAR
BILIRUB UR STRIP-MCNC: NEGATIVE MG/DL
COLOR UR AUTO: YELLOW
GLUCOSE UR STRIP.AUTO-MCNC: NEGATIVE MG/DL
KETONES UR STRIP.AUTO-MCNC: NORMAL MG/DL
LEUKOCYTE ESTERASE UR QL STRIP.AUTO: NEGATIVE
NITRITE UR QL STRIP.AUTO: NEGATIVE
PH UR STRIP.AUTO: 6.5 [PH] (ref 5–8)
PROT UR QL STRIP: NEGATIVE MG/DL
RBC UR QL AUTO: NEGATIVE
SP GR UR STRIP.AUTO: 1.01
UROBILINOGEN UR STRIP-MCNC: 0.2 MG/DL

## 2020-01-22 PROCEDURE — 99214 OFFICE O/P EST MOD 30 MIN: CPT | Performed by: INTERNAL MEDICINE

## 2020-01-22 PROCEDURE — 81002 URINALYSIS NONAUTO W/O SCOPE: CPT | Performed by: INTERNAL MEDICINE

## 2020-01-22 RX ORDER — TRAZODONE HYDROCHLORIDE 50 MG/1
TABLET ORAL
Qty: 60 TAB | Refills: 5 | Status: SHIPPED | OUTPATIENT
Start: 2020-01-22

## 2020-01-22 RX ORDER — LEVOTHYROXINE SODIUM 175 UG/1
175 CAPSULE ORAL DAILY
Qty: 90 CAP | Refills: 1 | Status: SHIPPED | OUTPATIENT
Start: 2020-01-22

## 2020-01-22 NOTE — PROGRESS NOTES
CC: Follow-up urgent care    Verified identification with patient. Secured video conference with RN presenter in [facility]      HPI:   Kaylynn presents today with the following.    1. Dysuria  Patient was seen in the urgent care in [place] on January 11 with urinary symptoms to include dysuria.  Urinalysis showed positive leukocyte esterase, positive small amounts of blood, positive nitrite.  Patient was started on Cipro however the urgent care notify patient that cultures were negative.  Patient discontinued Cipro for 2 days.  She did however continue to have symptoms and restarted the Cipro 500 mg twice daily and had a total of 2-1/2 days of medication.  Symptoms slowly resolving.  Drinks 2 quarts of cranberry juice daily.  No fevers or chills, frequency or dysuria at this time.    2. Sleep disorder  Patient has trouble with sleep, mostly secondary to anxiety and also comfort as she is unable to take certain pain medications as they will interact with her pulmonary medications.  She takes temazepam 30 mg at nighttime to initiate sleep, she is up 2-1/2 hours later and takes a Xanax.  She does not seem to get restful sleep.  When she takes her Xanax she is able to sleep throughout the evening    3. Anxiety  Ongoing anxiety, takes Xanax as needed, mostly uses it for sleep at this time.      Patient Active Problem List    Diagnosis Date Noted   • Sleep disorder 01/16/2020   • Anxiety 01/16/2020   • Chronic left shoulder pain 10/31/2019   • Tinnitus of both ears 09/04/2019   • Rash 09/04/2019   • Epigastric pain 08/21/2019   • Recurrent UTI (urinary tract infection) 05/29/2019   • Subacute maxillary sinusitis 05/15/2019   • Skin lesion of back 05/15/2019   • Pulmonary arterial hypertension (HCC) 04/24/2019   • History of deep venous thrombosis 04/24/2019   • History of pulmonary embolism 04/24/2019   • Chronic respiratory failure with hypoxia (HCC) 04/24/2019   • Anticoagulation adequate 04/24/2019   • SVT  (supraventricular tachycardia) ([facility]) 04/24/2019   • Palpitations 04/24/2019   • Anticoagulated 04/24/2019   • Low hemoglobin 10/17/2018   • Renal insufficiency 10/17/2018   • Chronic right shoulder pain 02/27/2018   • Gastroesophageal reflux disease without esophagitis 02/27/2018   • Spinal stenosis of lumbar region with neurogenic claudication 12/28/2017   • Medication management 12/28/2017   • Abdominal discomfort, generalized 08/23/2017   • Hyperglycemia 05/03/2017   • SOB (shortness of breath) 04/05/2017   • Acute bronchitis due to Haemophilus influenzae 04/05/2017   • Lumbar discogenic pain syndrome 07/27/2016   • Dysphagia 07/27/2016   • Pulmonary hypertension ([facility]) 06/13/2016   • Rheumatoid arthritis involving multiple sites with positive rheumatoid factor ([facility]) 06/13/2016   • Hypertension 06/13/2016   • Neuropathy ([facility]) 06/13/2016   • Iron (Fe) deficiency anemia 06/13/2016   • Vitamin B12 deficiency 06/13/2016   • Hypothyroidism due to acquired atrophy of thyroid 06/13/2016   • Pulmonary embolism ([facility]) 06/13/2016   • Multiple thyroid nodules 06/13/2016   • DVT of popliteal vein ([facility]) 10/09/2013   • History of renal insufficiency syndrome 10/09/2013       Current Outpatient Medications   Medication Sig Dispense Refill   • ALPRAZolam (XANAX) 0.5 MG Tab Take 1 Tab by mouth at bedtime as needed for Sleep for up to 90 days. 30 Tab 2   • temazepam (RESTORIL) 30 MG capsule Take 1 Cap by mouth at bedtime as needed for Sleep for up to 180 days. 90 Cap 1   • pregabalin (LYRICA) 150 MG Cap      • famotidine (PEPCID) 40 MG Tab Take 1 Tab by mouth 2 times a day. 180 Tab 2   • levothyroxine (SYNTHROID) 150 MCG Tab Take 1 Tab by mouth Every morning on an empty stomach. 90 Tab 1   • rivaroxaban (XARELTO) 15 MG Tab tablet Take 1 Tab by mouth with dinner. 90 Tab 2   • pantoprazole (PROTONIX) 40 MG Tablet Delayed Response Take 1 Tab by mouth every day. 90 Tab 2   • Tadalafil, PAH, 20 MG Tab      • Folic Acid-Vit B6-Vit B12  (FOLBEE) 2.5-25-1 MG Tab Take 1 tablet by mouth every day. 90 Tab 1   • potassium chloride (KLOR-CON) 20 MEQ Pack Take 100 mEq by mouth every day.     • albuterol 108 (90 Base) MCG/ACT Aero Soln inhalation aerosol Inhale 2 Puffs by mouth every 6 hours as needed for Shortness of Breath. 8.5 g 1   • Macitentan (OPSUMIT) 10 MG Tab Take  by mouth.     • digoxin (LANOXIN) 125 MCG Tab TAKE 1 TABLET BY MOUTH  DAILY.     • DILTIAZem (CARDIZEM) 30 MG Tab Take 1 tablet by mouth 4  times daily.     • Cholecalciferol (VITAMIN D3) 1000 units Cap Take 1,000 Units by mouth.     • Ascorbic Acid (VITAMIN C) 1000 MG Tab Take 1,000 mg by mouth.     • CALCIUM PO Take 500 mg by mouth.     • VIRT-GILLIAN 2.5-25-1 MG Tab TAKE 1 TABLET BY MOUTH  EVERY DAY 90 Tab 1   • betamethasone dipropionate (DIPROLENE) 0.05 % Cream Apply topically as needed     • MAGNESIUM-ZINC PO Take  by mouth.     • Ferrous Sulfate (IRON) 325 (65 Fe) MG Tab Take 1 Tab by mouth every day. 30 Tab 2   • sulfaSALAzine (AZULFIDINE) 500 MG Tab Take 500 mg by mouth. 1500 mg BID     • NON SPECIFIED Rx continuous O2 at 2L n/c   New O2 concentrator  Lowest O2 sat 82% 1 Device 6   • furosemide (LASIX) 40 MG Tab Take 40 mg by mouth 3 times a day.     • hydroxychloroquine (PLAQUENIL) 200 MG Tab Take 200 mg by mouth 2 times a day.     • leflunomide (ARAVA) 20 MG Tab Take 20 mg by mouth every day.     • Treprostinil (TYVASO INH) Inhale 1.74 mg by mouth. Inhale 15 puffs orally four times daily  Indications: 1.74mg/2.9ml     • ALPRAZolam (XANAX) 0.5 MG Tab Take 1 Tab by mouth at bedtime as needed for Sleep for up to 180 days. 90 Tab 1   • dexamethasone (DECADRON) 1.5 MG Tab   0   • Influenza Vaccine Quad pf 0.5 ML Suspension Prefilled Syringe injection TO BE ADMINISTERED BY THE PHARMACIST FOR IMMUNIZATION     • gatifloxacin 0.5% (ZYMAXID) 0.5 % Solution      • albuterol 108 (90 Base) MCG/ACT Aero Soln inhalation aerosol Inhale 2 Puffs by mouth.     • digoxin (LANOXIN) 125 MCG Tab TAKE  "1 TABLET BY MOUTH  DAILY.     • DILTIAZem (CARDIZEM) 30 MG Tab Take 1 tablet by mouth 4  times daily.     • Treprostinil 0.6 MG/ML Solution Inhale 12-15 Puffs by mouth.     • XARELTO 15 MG Tab tablet Take 1 Tab by mouth with dinner. 90 Tab 3   • tadalafil (CIALIS) 20 MG tablet      • Cyanocobalamin (VITAMIN B-12) 1000 MCG Tab Take 1,000 mcg by mouth.     • Dermatological Products, Misc. (EPICERAM) Emulsion Apply  to affected area(s).     • fluticasone (FLONASE) 50 MCG/ACT nasal spray Spray 1 Spray in nose every day. (Patient not taking: Reported on 12/6/2019) 16 g 6   • gabapentin (NEURONTIN) 800 MG tablet      • triamcinolone acetonide (KENALOG) 0.1 % Cream      • ANORO ELLIPTA 62.5-25 MCG/INH AEROSOL POWDER, BREATH ACTIVATED      • NON SPECIFIED Rx continuous O2 at 2L n/c  New O2 concentrator  Lowest O2 sat 82%  SOB without use of O2 1 Each 11   • calcium carbonate (OS-LUIS ARMANDO 500) 500 MG Tab Take 1,000 mg by mouth 2 times a day, with meals.     • pantoprazole (PROTONIX) 40 MG Tablet Delayed Response Take 40 mg by mouth 2 times a day.     • tramadol (ULTRAM) 50 MG Tab Take 50 mg by mouth every four hours as needed.       No current facility-administered medications for this visit.          Allergies as of 01/16/2020 - Reviewed 01/16/2020   Allergen Reaction Noted   • Latex Anaphylaxis and Rash 10/09/2013   • Erythrocin  05/01/2019   • Tape  06/13/2016   • Warfarin sodium Hives 06/13/2016   • Coumarin Hives and Rash 10/09/2013        ROS: As per HPI.  Fatigue, multiple joint arthralgia.  Denies all other cardiopulmonary, GI, neurologic symptoms.    /76 (BP Location: Right arm, Patient Position: Sitting, BP Cuff Size: Adult)   Pulse 79   Temp 37.1 °C (98.8 °F) (Temporal)   Resp 18   Ht 1.702 m (5' 7\")   Wt 72.6 kg (160 lb)   SpO2 91%   BMI 25.06 kg/m²     Physical Exam:  Gen:         Alert and oriented, No apparent distress.  Neck:        No Lymphadenopathy or Bruits.  Neck is supple.  Lungs:     Clear to " auscultation bilaterally, no wheezing rhonchi or crackles  CV:          Regular rate and rhythm. No murmurs, rubs or gallops.  Abd:         Soft non tender, non distended. Normal active bowel sounds.  No  Hepatosplenomegaly, No pulsatile masses.  No CVA tenderness bilaterally.  No suprapubic tenderness.                  Ext:          No clubbing, cyanosis, edema.      Assessment and Plan.   69 y.o. female with the following issues.    1. Dysuria  Complete course of ciprofloxacin as prescribed  Continue cranberry juice and fluid hydration  Recheck urinalysis within 1 week    2. Sleep disorder  Discussed sleep hygiene  Discussed other means of pain control  Discontinue temazepam at this time  Trial with using Xanax 0.5 mg at bedtime    3. Anxiety    - ALPRAZolam (XANAX) 0.5 MG Tab; Take 1 Tab by mouth at bedtime as needed for Sleep for up to 90 days.  Dispense: 30 Tab; Refill: 2            Please note that this dictation was created using voice recognition software. I have made every reasonable attempt to correct obvious errors, but expect that there are errors of grammar and possible content that I did not discover before finalizing note.

## 2020-01-23 ENCOUNTER — TELEPHONE (OUTPATIENT)
Dept: MEDICAL GROUP | Facility: CLINIC | Age: 70
End: 2020-01-23

## 2020-01-23 NOTE — TELEPHONE ENCOUNTER
Message received from [employer] - Tirosint is not covered by Insurance.   Covered alternatives:  Levothyroxine TAB

## 2020-01-28 ENCOUNTER — OFFICE VISIT (OUTPATIENT)
Dept: MEDICAL GROUP | Facility: CLINIC | Age: 70
End: 2020-01-28
Payer: COMMERCIAL

## 2020-01-28 VITALS
DIASTOLIC BLOOD PRESSURE: 73 MMHG | TEMPERATURE: 97.8 F | HEART RATE: 70 BPM | RESPIRATION RATE: 18 BRPM | OXYGEN SATURATION: 92 % | SYSTOLIC BLOOD PRESSURE: 140 MMHG

## 2020-01-28 DIAGNOSIS — Z12.4 PAPANICOLAOU SMEAR: ICD-10-CM

## 2020-01-28 PROCEDURE — 99397 PER PM REEVAL EST PAT 65+ YR: CPT | Performed by: PHYSICIAN ASSISTANT

## 2020-01-28 RX ORDER — CHLORHEXIDINE GLUCONATE ORAL RINSE 1.2 MG/ML
SOLUTION DENTAL
COMMUNITY
Start: 2020-01-27

## 2020-01-28 RX ORDER — POTASSIUM CHLORIDE 20 MEQ/1
TABLET, EXTENDED RELEASE ORAL
COMMUNITY
Start: 2019-11-04

## 2020-01-28 RX ORDER — FAMOTIDINE 40 MG/1
40 TABLET, FILM COATED ORAL
COMMUNITY
Start: 2019-10-31

## 2020-01-28 RX ORDER — TADALAFIL 20 MG/1
TABLET ORAL
COMMUNITY
Start: 2019-12-26 | End: 2020-01-28

## 2020-01-28 NOTE — PROGRESS NOTES
cc:  Pap smear    Subjective:     Kaylynn Polo is a 69 y.o. female presenting for pap smear      Patient presents to the office for pap smear.  Her last pap smear was 3-4 years ago.   Her last mammogram was October 2019.  She denies having an abnormal pap smear.  She denies a discharge, fever or any other symptoms.  She does have a history of estrogen use in the past.  It is believed that this contributed to multiple PEs.  For this reason she cannot return to hormone replacement therapy.  Her last lmp has been years.      Review of systems:  See above.   Denies any symptoms unless previously indicated.        Current Outpatient Medications:   •  potassium chloride SA (KDUR) 20 MEQ Tab CR, Take six tablets po once per day., Disp: , Rfl:   •  DILTIAZem (CARDIZEM) 30 MG Tab, TAKE 1 TABLET BY MOUTH 4  TIMES DAILY, Disp: , Rfl:   •  famotidine (PEPCID) 40 MG Tab, Take 40 mg by mouth., Disp: , Rfl:   •  Macitentan 10 MG Tab, TAKE 1 TABLET (10MG) BY MOUTH DAILY., Disp: , Rfl:   •  chlorhexidine (PERIDEX) 0.12 % Solution, , Disp: , Rfl:   •  traZODone (DESYREL) 50 MG Tab, Take 1 to 2 tablets p.o. nightly as needed, Disp: 60 Tab, Rfl: 5  •  Levothyroxine Sodium 175 MCG Cap, Take 175 mcg by mouth every day., Disp: 90 Cap, Rfl: 1  •  ALPRAZolam (XANAX) 0.5 MG Tab, Take 1 Tab by mouth at bedtime as needed for Sleep for up to 90 days., Disp: 30 Tab, Rfl: 2  •  temazepam (RESTORIL) 30 MG capsule, Take 1 Cap by mouth at bedtime as needed for Sleep for up to 180 days., Disp: 90 Cap, Rfl: 1  •  ALPRAZolam (XANAX) 0.5 MG Tab, Take 1 Tab by mouth at bedtime as needed for Sleep for up to 180 days., Disp: 90 Tab, Rfl: 1  •  pregabalin (LYRICA) 150 MG Cap, , Disp: , Rfl:   •  dexamethasone (DECADRON) 1.5 MG Tab, , Disp: , Rfl: 0  •  famotidine (PEPCID) 40 MG Tab, Take 1 Tab by mouth 2 times a day., Disp: 180 Tab, Rfl: 2  •  levothyroxine (SYNTHROID) 150 MCG Tab, Take 1 Tab by mouth Every morning on an empty stomach., Disp: 90 Tab, Rfl:  1  •  rivaroxaban (XARELTO) 15 MG Tab tablet, Take 1 Tab by mouth with dinner., Disp: 90 Tab, Rfl: 2  •  pantoprazole (PROTONIX) 40 MG Tablet Delayed Response, Take 1 Tab by mouth every day., Disp: 90 Tab, Rfl: 2  •  Influenza Vaccine Quad pf 0.5 ML Suspension Prefilled Syringe injection, TO BE ADMINISTERED BY THE PHARMACIST FOR IMMUNIZATION, Disp: , Rfl:   •  gatifloxacin 0.5% (ZYMAXID) 0.5 % Solution, , Disp: , Rfl:   •  albuterol 108 (90 Base) MCG/ACT Aero Soln inhalation aerosol, Inhale 2 Puffs by mouth., Disp: , Rfl:   •  digoxin (LANOXIN) 125 MCG Tab, TAKE 1 TABLET BY MOUTH  DAILY., Disp: , Rfl:   •  DILTIAZem (CARDIZEM) 30 MG Tab, Take 1 tablet by mouth 4  times daily., Disp: , Rfl:   •  Treprostinil 0.6 MG/ML Solution, Inhale 12-15 Puffs by mouth., Disp: , Rfl:   •  Folic Acid-Vit B6-Vit B12 (FOLBEE) 2.5-25-1 MG Tab, Take 1 tablet by mouth every day., Disp: 90 Tab, Rfl: 1  •  XARELTO 15 MG Tab tablet, Take 1 Tab by mouth with dinner., Disp: 90 Tab, Rfl: 3  •  potassium chloride (KLOR-CON) 20 MEQ Pack, Take 100 mEq by mouth every day., Disp: , Rfl:   •  albuterol 108 (90 Base) MCG/ACT Aero Soln inhalation aerosol, Inhale 2 Puffs by mouth every 6 hours as needed for Shortness of Breath., Disp: 8.5 g, Rfl: 1  •  Macitentan (OPSUMIT) 10 MG Tab, Take  by mouth., Disp: , Rfl:   •  tadalafil (CIALIS) 20 MG tablet, , Disp: , Rfl:   •  Cyanocobalamin (VITAMIN B-12) 1000 MCG Tab, Take 1,000 mcg by mouth., Disp: , Rfl:   •  digoxin (LANOXIN) 125 MCG Tab, TAKE 1 TABLET BY MOUTH  DAILY., Disp: , Rfl:   •  DILTIAZem (CARDIZEM) 30 MG Tab, Take 1 tablet by mouth 4  times daily., Disp: , Rfl:   •  Cholecalciferol (VITAMIN D3) 1000 units Cap, Take 1,000 Units by mouth., Disp: , Rfl:   •  Ascorbic Acid (VITAMIN C) 1000 MG Tab, Take 1,000 mg by mouth., Disp: , Rfl:   •  CALCIUM PO, Take 500 mg by mouth., Disp: , Rfl:   •  VIRT-GILLIAN 2.5-25-1 MG Tab, TAKE 1 TABLET BY MOUTH  EVERY DAY, Disp: 90 Tab, Rfl: 1  •  betamethasone  dipropionate (DIPROLENE) 0.05 % Cream, Apply topically as needed, Disp: , Rfl:   •  Dermatological Products, Misc. (EPICERAM) Emulsion, Apply  to affected area(s)., Disp: , Rfl:   •  MAGNESIUM-ZINC PO, Take  by mouth., Disp: , Rfl:   •  Ferrous Sulfate (IRON) 325 (65 Fe) MG Tab, Take 1 Tab by mouth every day., Disp: 30 Tab, Rfl: 2  •  fluticasone (FLONASE) 50 MCG/ACT nasal spray, Spray 1 Spray in nose every day. (Patient not taking: Reported on 12/6/2019), Disp: 16 g, Rfl: 6  •  sulfaSALAzine (AZULFIDINE) 500 MG Tab, Take 500 mg by mouth. 1500 mg BID, Disp: , Rfl:   •  gabapentin (NEURONTIN) 800 MG tablet, , Disp: , Rfl:   •  triamcinolone acetonide (KENALOG) 0.1 % Cream, , Disp: , Rfl:   •  ANORO ELLIPTA 62.5-25 MCG/INH AEROSOL POWDER, BREATH ACTIVATED, , Disp: , Rfl:   •  NON SPECIFIED, Rx continuous O2 at 2L n/c New O2 concentrator Lowest O2 sat 82% SOB without use of O2, Disp: 1 Each, Rfl: 11  •  NON SPECIFIED, Rx continuous O2 at 2L n/c  New O2 concentrator Lowest O2 sat 82%, Disp: 1 Device, Rfl: 6  •  calcium carbonate (OS-LUIS ARMANDO 500) 500 MG Tab, Take 1,000 mg by mouth 2 times a day, with meals., Disp: , Rfl:   •  furosemide (LASIX) 40 MG Tab, Take 40 mg by mouth 3 times a day., Disp: , Rfl:   •  hydroxychloroquine (PLAQUENIL) 200 MG Tab, Take 200 mg by mouth 2 times a day., Disp: , Rfl:   •  leflunomide (ARAVA) 20 MG Tab, Take 20 mg by mouth every day., Disp: , Rfl:   •  pantoprazole (PROTONIX) 40 MG Tablet Delayed Response, Take 40 mg by mouth 2 times a day., Disp: , Rfl:   •  tramadol (ULTRAM) 50 MG Tab, Take 50 mg by mouth every four hours as needed., Disp: , Rfl:   •  Treprostinil (TYVASO INH), Inhale 1.74 mg by mouth. Inhale 15 puffs orally four times daily  Indications: 1.74mg/2.9ml, Disp: , Rfl:     Allergies, past medical history, past surgical history, family history, social history reviewed and updated    Objective:     Vitals: /73 (BP Location: Right arm, Patient Position: Sitting)   Pulse 70    Temp 36.6 °C (97.8 °F) (Temporal)   Resp 18   SpO2 92%   General: Alert, pleasant, NAD  EYES:   PERRL, EOMI, no icterus or pallor.  Conjunctivae and lids normal.   HENT:  Normocephalic.  External ears normal. No nasal drainage present.  Neck supple.     Respiratory:  On O2 with nasal canula  Abdomen: Not obese  Breasts:  Symmetrical, nontender, without masses or nipple discharge.    Pelvic exam: Normal external genitalia including urethral meatus.  Well supported, nontender urethra and bladder.  Vagina and cervix without significant discharge or lesions.  No cervical motion tenderness.  Anteflexed uterus of normal size, shape, and consistency. Cervix to patient's right.   No adnexal masses or tenderness.   Vaginal atrophy present  Skin: Warm, dry, no rashes.  Musculoskeletal: Gait is normal.  Moves all extremities well.    Neurological: No tremors, sensation grossly intact, CN2-12 intact.  Psych:  Affect/mood is normal, judgement is good, memory is intact, grooming is appropriate.    Assessment/Plan:     Kaylynn was seen today for gynecologic exam.    Diagnoses and all orders for this visit:    Papanicolaou smear  -     THINPREP PAP WITH HPV; Future    Cervical specimen collected.  We will send to lab for further analysis.  Will notify patient of results when received.        No follow-ups on file.    Please note that this dictation was created using voice recognition software. I have made every reasonable attempt to correct obvious errors, but expect that there are errors of grammar and possible content that I did not discover before finalizing note.

## 2020-02-05 ENCOUNTER — TELEMEDICINE2 (OUTPATIENT)
Dept: MEDICAL GROUP | Age: 70
End: 2020-02-05
Payer: COMMERCIAL

## 2020-02-05 VITALS
HEART RATE: 93 BPM | OXYGEN SATURATION: 91 % | WEIGHT: 158 LBS | SYSTOLIC BLOOD PRESSURE: 117 MMHG | RESPIRATION RATE: 16 BRPM | TEMPERATURE: 97.6 F | DIASTOLIC BLOOD PRESSURE: 70 MMHG | BODY MASS INDEX: 25.5 KG/M2

## 2020-02-05 DIAGNOSIS — M05.79 RHEUMATOID ARTHRITIS INVOLVING MULTIPLE SITES WITH POSITIVE RHEUMATOID FACTOR (HCC): ICD-10-CM

## 2020-02-05 DIAGNOSIS — K21.00 GASTROESOPHAGEAL REFLUX DISEASE WITH ESOPHAGITIS: ICD-10-CM

## 2020-02-05 DIAGNOSIS — E03.4 HYPOTHYROIDISM DUE TO ACQUIRED ATROPHY OF THYROID: ICD-10-CM

## 2020-02-05 DIAGNOSIS — I27.21 PULMONARY ARTERIAL HYPERTENSION (HCC): ICD-10-CM

## 2020-02-05 DIAGNOSIS — Z79.899 MEDICATION MANAGEMENT: ICD-10-CM

## 2020-02-05 DIAGNOSIS — G47.9 SLEEP DISORDER: ICD-10-CM

## 2020-02-05 PROCEDURE — 99214 OFFICE O/P EST MOD 30 MIN: CPT | Performed by: INTERNAL MEDICINE

## 2020-02-05 RX ORDER — TRAZODONE HYDROCHLORIDE 50 MG/1
TABLET ORAL
Qty: 90 TAB | Refills: 8 | Status: SHIPPED | OUTPATIENT
Start: 2020-02-05

## 2020-02-05 RX ORDER — PANTOPRAZOLE SODIUM 40 MG/1
40 TABLET, DELAYED RELEASE ORAL DAILY
Qty: 90 TAB | Refills: 2 | Status: SHIPPED | OUTPATIENT
Start: 2020-02-05

## 2020-02-05 NOTE — PROGRESS NOTES
CC: Follow-up lab work    Verified identification with patient. Secured video conference with RN presenter in [facility]      HPI:   Kaylynn presents today with the following.    1. Other iron deficiency anemia  Long history of iron deficiency anemia.  Hemoglobin level 9.8 hematocrit level 30.  Patient's iron level is slightly elevated to 14, TIBC 329 within normal limits.  Slightly elevated percent saturation 65%.  Ferritin within normal limits 41.  Vitamin B12 greater than 2000.  .  Folate levels within normal limits.  Patient's hemoglobin level fluctuates and averages around 10.5.  Most recent blood transfusion was 2015.  BUN/creatinine within normal limits.  Colonoscopy up-to-date.  Recent EGD showed pill gastritis.  Patient completed a FOBT.  Patient discontinued her iron and vitamin B12 about 2 weeks ago.  Patient was taking iron 325 mg tablets daily..    Update 1/22/: 20 most likely anemia of chronic disease.  Follow-up lab work total iron 29, TIBC within normal limits, ferritin low at 18, percent saturation 7 down from 65.  Hemoglobin 9.5, stable from 9.8.  BUN and creatinine within normal limits.  Patient discontinued iron and vitamin B12 supplementation November.    2. Sleep disorder  From last visit, patient tried discontinuing temazepam as a use for sleep aid.  Patient would like a trial of only Xanax 0.5 mg to take for anxiety and trouble falling asleep.  Patient has tried Ambien in the past.  Patient with the Xanax slept only about an hour and 1/2 to 2 hours for tossing and turning and waking up.  Patient would like to try something different to help with her sleep.    3. Dysuria  Recent history of possible UTI.  Patient completed ciprofloxacin on January 19.  Follow-up urinalysis today is negative.  Asymptomatic at this time.    4. Hypothyroidism due to acquired atrophy of thyroid  Most recently patient was taking Synthroid 150 MCG's alternating it with half a tablet of 137 MCG's every other  day.  Current TSH is 0.18 down from 5.3 in November.  Patient states she is asymptomatic.  Patient would like a an [facility]      Patient Active Problem List    Diagnosis Date Noted   • Papanicolaou smear 01/28/2020   • Dysuria 01/22/2020   • Sleep disorder 01/16/2020   • Anxiety 01/16/2020   • Chronic left shoulder pain 10/31/2019   • Tinnitus of both ears 09/04/2019   • Rash 09/04/2019   • Epigastric pain 08/21/2019   • Recurrent UTI (urinary tract infection) 05/29/2019   • Subacute maxillary sinusitis 05/15/2019   • Skin lesion of back 05/15/2019   • Pulmonary arterial hypertension (HCC) 04/24/2019   • History of deep venous thrombosis 04/24/2019   • History of pulmonary embolism 04/24/2019   • Chronic respiratory failure with hypoxia ([facility]) 04/24/2019   • Anticoagulation adequate 04/24/2019   • SVT (supraventricular tachycardia) ([facility]) 04/24/2019   • Palpitations 04/24/2019   • Anticoagulated 04/24/2019   • Low hemoglobin 10/17/2018   • Renal insufficiency 10/17/2018   • Chronic right shoulder pain 02/27/2018   • Gastroesophageal reflux disease without esophagitis 02/27/2018   • Spinal stenosis of lumbar region with neurogenic claudication 12/28/2017   • Medication management 12/28/2017   • Abdominal discomfort, generalized 08/23/2017   • Hyperglycemia 05/03/2017   • SOB (shortness of breath) 04/05/2017   • Acute bronchitis due to Haemophilus influenzae 04/05/2017   • Lumbar discogenic pain syndrome 07/27/2016   • Dysphagia 07/27/2016   • Pulmonary hypertension ([facility]) 06/13/2016   • Rheumatoid arthritis involving multiple sites with positive rheumatoid factor ([facility]) 06/13/2016   • Hypertension 06/13/2016   • Neuropathy ([facility]) 06/13/2016   • Iron (Fe) deficiency anemia 06/13/2016   • Vitamin B12 deficiency 06/13/2016   • Hypothyroidism due to acquired atrophy of thyroid 06/13/2016   • Pulmonary embolism ([facility]) 06/13/2016   • Multiple thyroid nodules 06/13/2016   • D[facility]al vein  ([facility] 10/09/2013   • History of renal insufficiency syndrome 10/09/2013       Current Outpatient Medications   Medication Sig Dispense Refill   • traZODone (DESYREL) 50 MG Tab Take 1 to 2 tablets p.o. nightly as needed 60 Tab 5   • Levothyroxine Sodium 175 MCG Cap Take 175 mcg by mouth every day. 90 Cap 1   • chlorhexidine (PERIDEX) 0.12 % Solution      • potassium chloride SA (KDUR) 20 MEQ Tab CR Take six tablets po once per day.     • DILTIAZem (CARDIZEM) 30 MG Tab TAKE 1 TABLET BY MOUTH 4  TIMES DAILY     • famotidine (PEPCID) 40 MG Tab Take 40 mg by mouth.     • Macitentan 10 MG Tab TAKE 1 TABLET (10MG) BY MOUTH DAILY.     • ALPRAZolam (XANAX) 0.5 MG Tab Take 1 Tab by mouth at bedtime as needed for Sleep for up to 90 days. 30 Tab 2   • temazepam (RESTORIL) 30 MG capsule Take 1 Cap by mouth at bedtime as needed for Sleep for up to 180 days. 90 Cap 1   • ALPRAZolam (XANAX) 0.5 MG Tab Take 1 Tab by mouth at bedtime as needed for Sleep for up to 180 days. 90 Tab 1   • pregabalin (LYRICA) 150 MG Cap      • dexamethasone (DECADRON) 1.5 MG Tab   0   • famotidine (PEPCID) 40 MG Tab Take 1 Tab by mouth 2 times a day. 180 Tab 2   • levothyroxine (SYNTHROID) 150 MCG Tab Take 1 Tab by mouth Every morning on an empty stomach. 90 Tab 1   • rivaroxaban (XARELTO) 15 MG Tab tablet Take 1 Tab by mouth with dinner. 90 Tab 2   • pantoprazole (PROTONIX) 40 MG Tablet Delayed Response Take 1 Tab by mouth every day. 90 Tab 2   • Influenza Vaccine Quad pf 0.5 ML Suspension Prefilled Syringe injection TO BE ADMINISTERED BY THE PHARMACIST FOR IMMUNIZATION     • gatifloxacin 0.5% (ZYMAXID) 0.5 % Solution      • albuterol 108 (90 Base) MCG/ACT Aero Soln inhalation aerosol Inhale 2 Puffs by mouth.     • digoxin (LANOXIN) 125 MCG Tab TAKE 1 TABLET BY MOUTH  DAILY.     • DILTIAZem (CARDIZEM) 30 MG Tab Take 1 tablet by mouth 4  times daily.     • Treprostinil 0.6 MG/ML Solution Inhale 12-15 Puffs by mouth.     • Folic Acid-Vit B6-Vit B12  (FOLBEE) 2.5-25-1 MG Tab Take 1 tablet by mouth every day. 90 Tab 1   • XARELTO 15 MG Tab tablet Take 1 Tab by mouth with dinner. 90 Tab 3   • potassium chloride (KLOR-CON) 20 MEQ Pack Take 100 mEq by mouth every day.     • albuterol 108 (90 Base) MCG/ACT Aero Soln inhalation aerosol Inhale 2 Puffs by mouth every 6 hours as needed for Shortness of Breath. 8.5 g 1   • Macitentan (OPSUMIT) 10 MG Tab Take  by mouth.     • tadalafil (CIALIS) 20 MG tablet      • Cyanocobalamin (VITAMIN B-12) 1000 MCG Tab Take 1,000 mcg by mouth.     • digoxin (LANOXIN) 125 MCG Tab TAKE 1 TABLET BY MOUTH  DAILY.     • DILTIAZem (CARDIZEM) 30 MG Tab Take 1 tablet by mouth 4  times daily.     • Cholecalciferol (VITAMIN D3) 1000 units Cap Take 1,000 Units by mouth.     • Ascorbic Acid (VITAMIN C) 1000 MG Tab Take 1,000 mg by mouth.     • CALCIUM PO Take 500 mg by mouth.     • VIRT-GILLIAN 2.5-25-1 MG Tab TAKE 1 TABLET BY MOUTH  EVERY DAY 90 Tab 1   • betamethasone dipropionate (DIPROLENE) 0.05 % Cream Apply topically as needed     • Dermatological Products, Misc. (EPICERAM) Emulsion Apply  to affected area(s).     • MAGNESIUM-ZINC PO Take  by mouth.     • Ferrous Sulfate (IRON) 325 (65 Fe) MG Tab Take 1 Tab by mouth every day. 30 Tab 2   • fluticasone (FLONASE) 50 MCG/ACT nasal spray Spray 1 Spray in nose every day. (Patient not taking: Reported on 12/6/2019) 16 g 6   • sulfaSALAzine (AZULFIDINE) 500 MG Tab Take 500 mg by mouth. 1500 mg BID     • gabapentin (NEURONTIN) 800 MG tablet      • triamcinolone acetonide (KENALOG) 0.1 % Cream      • ANORO ELLIPTA 62.5-25 MCG/INH AEROSOL POWDER, BREATH ACTIVATED      • NON SPECIFIED Rx continuous O2 at 2L n/c  New O2 concentrator  Lowest O2 sat 82%  SOB without use of O2 1 Each 11   • NON SPECIFIED Rx continuous O2 at 2L n/c   New O2 concentrator  Lowest O2 sat 82% 1 Device 6   • calcium carbonate (OS-LUIS ARMANDO 500) 500 MG Tab Take 1,000 mg by mouth 2 times a day, with meals.     • furosemide (LASIX) 40 MG  "Tab Take 40 mg by mouth 3 times a day.     • hydroxychloroquine (PLAQUENIL) 200 MG Tab Take 200 mg by mouth 2 times a day.     • leflunomide (ARAVA) 20 MG Tab Take 20 mg by mouth every day.     • pantoprazole (PROTONIX) 40 MG Tablet Delayed Response Take 40 mg by mouth 2 times a day.     • tramadol (ULTRAM) 50 MG Tab Take 50 mg by mouth every four hours as needed.     • Treprostinil (TYVASO INH) Inhale 1.74 mg by mouth. Inhale 15 puffs orally four times daily  Indications: 1.74mg/2.9ml       No current facility-administered medications for this visit.          Allergies as of 01/22/2020 - Reviewed 01/22/2020   Allergen Reaction Noted   • Latex Anaphylaxis and Rash 10/09/2013   • Erythrocin  05/01/2019   • Tape  06/13/2016   • Warfarin sodium Hives 06/13/2016   • Coumarin Hives and Rash 10/09/2013        ROS: As per HPI.    /92 (BP Location: Right arm, Patient Position: Sitting, BP Cuff Size: Adult)   Pulse 80   Temp 37 °C (98.6 °F) (Temporal)   Resp 16   Ht 1.676 m (5' 6\")   Wt 71.2 kg (157 lb)   SpO2 92%   BMI 25.34 kg/m²     Physical Exam: Fatigue, mild shortness of breath, chronic.  Left shoulder discomfort.  Denies all other cardiopulmonary, GI, neurologic symptoms.  Gen:         Alert and oriented, No apparent distress.  Neck:        No Lymphadenopathy or Bruits.  Lungs:     Clear to auscultation bilaterally, no wheezing rhonchi or crackles  CV:          Regular rate and rhythm. No murmurs, rubs or gallops.  Abd:         Soft non tender, non distended. Normal active bowel sounds.  No  Hepatosplenomegaly, No pulsatile masses.                   Ext:          No clubbing, cyanosis, edema.      Assessment and Plan.   69 y.o. female with the following issues.    1. Other iron deficiency anemia  All labs reviewed with patient dating back to 2017.  Patient will restart iron and vitamin B12 supplementation    2. Sleep disorder  Sleep hygiene discussed  Trial with trazodone    - traZODone (DESYREL) 50 MG " Tab; Take 1 to 2 tablets p.o. nightly as needed  Dispense: 60 Tab; Refill: 5    3. Dysuria  Resolved    4. Hypothyroidism due to acquired atrophy of thyroid  Continue to monitor thyroid function panel    - Levothyroxine Sodium 175 MCG Cap; Take 175 mcg by mouth every day.  Dispense: 90 Cap; Refill: 1            Please note that this dictation was created using voice recognition software. I have made every reasonable attempt to correct obvious errors, but expect that there are errors of grammar and possible content that I did not discover before finalizing note.

## 2020-02-05 NOTE — PROGRESS NOTES
CC: Follow-up on sleep medication    Verified identification with patient. Secured video conference with RN presenter in [facility]      HPI:   Kaylynn presents today with the following.    1. Hypothyroidism due to acquired atrophy of thyroid  Patient has not received her levothyroxine 175 MCG dose from [facility] yet.  Patient had a low TSH recently of 0.18.  Complains of jitteriness, trouble sleeping    2. Sleep disorder  Patient is adjusting to trazodone and is using 50 mg 2 tablets at nighttime.  Patient is trying to discontinue temazepam.  Patient needed Xanax once in the last 2 weeks.  Patient is trying to maintain 6 hours of restful sleep.  She will occasionally wake up 2 hours after going to bed.    3. Pulmonary arterial hypertension (HCC)  Patient follows up regularly with [facility] and has an appointment in May.  Medications that are prescribed by her pulmonologist include digoxin, diltiazem, Tyvaso, OPSUMIT, furosemide, sialoliths, potassium chloride.  No issues at this time.  Continues to wear oxygen.    4. Rheumatoid arthritis involving multiple sites with positive rheumatoid factor (HCC)  Patient follows up with her rheumatologist virtually every 3 months.  Medications prescribed by her rheumatologist include sulfasalazine, Plaquenil, Lyrica.    5. Gastroesophageal reflux disease with esophagitis  Patient will need refills on Protonix.    6. Medication management  Patient concerned about her medications as [facility] is leaving [place].  Medication list reviewed with patient today.      Patient Active Problem List    Diagnosis Date Noted   • Papanicolaou smear 01/28/2020   • Dysuria 01/22/2020   • Sleep disorder 01/16/2020   • Anxiety 01/16/2020   • Chronic left shoulder pain 10/31/2019   • Tinnitus of both ears 09/04/2019   • Rash 09/04/2019   • Epigastric pain 08/21/2019   • Recurrent UTI (urinary tract infection) 05/29/2019   • Subacute maxillary sinusitis 05/15/2019   • Skin lesion of back  05/15/2019   • Pulmonary arterial hypertension (HCC) 04/24/2019   • History of deep venous thrombosis 04/24/2019   • History of pulmonary embolism 04/24/2019   • Chronic respiratory failure with hypoxia ([facility]) 04/24/2019   • Anticoagulation adequate 04/24/2019   • SVT (supraventricular tachycardia) ([facility]) 04/24/2019   • Palpitations 04/24/2019   • Anticoagulated 04/24/2019   • Low hemoglobin 10/17/2018   • Renal insufficiency 10/17/2018   • Chronic right shoulder pain 02/27/2018   • Gastroesophageal reflux disease without esophagitis 02/27/2018   • Spinal stenosis of lumbar region with neurogenic claudication 12/28/2017   • Medication management 12/28/2017   • Abdominal discomfort, generalized 08/23/2017   • Hyperglycemia 05/03/2017   • SOB (shortness of breath) 04/05/2017   • Acute bronchitis due to Haemophilus influenzae 04/05/2017   • Lumbar discogenic pain syndrome 07/27/2016   • Dysphagia 07/27/2016   • Pulmonary hypertension ([facility]) 06/13/2016   • Rheumatoid arthritis involving multiple sites with positive rheumatoid factor ([facility]) 06/13/2016   • Hypertension 06/13/2016   • Neuropathy ([facility]) 06/13/2016   • Iron (Fe) deficiency anemia 06/13/2016   • Vitamin B12 deficiency 06/13/2016   • Hypothyroidism due to acquired atrophy of thyroid 06/13/2016   • Pulmonary embolism ([facility]) 06/13/2016   • Multiple thyroid nodules 06/13/2016   • DVT of popliteal vein ([facility]) 10/09/2013   • History of renal insufficiency syndrome 10/09/2013       Current Outpatient Medications   Medication Sig Dispense Refill   • traZODone (DESYREL) 50 MG Tab Take 3 tabs QHS 90 Tab 8   • pantoprazole (PROTONIX) 40 MG Tablet Delayed Response Take 1 Tab by mouth every day. 90 Tab 2   • rivaroxaban (XARELTO) 15 MG Tab tablet Take 1 Tab by mouth with dinner. 90 Tab 2   • chlorhexidine (PERIDEX) 0.12 % Solution      • potassium chloride SA (KDUR) 20 MEQ Tab CR Take six tablets po once per day.     • DILTIAZem (CARDIZEM) 30 MG Tab TAKE 1 TABLET BY MOUTH 4   TIMES DAILY     • famotidine (PEPCID) 40 MG Tab Take 40 mg by mouth.     • Macitentan 10 MG Tab TAKE 1 TABLET (10MG) BY MOUTH DAILY.     • traZODone (DESYREL) 50 MG Tab Take 1 to 2 tablets p.o. nightly as needed 60 Tab 5   • Levothyroxine Sodium 175 MCG Cap Take 175 mcg by mouth every day. 90 Cap 1   • ALPRAZolam (XANAX) 0.5 MG Tab Take 1 Tab by mouth at bedtime as needed for Sleep for up to 90 days. 30 Tab 2   • temazepam (RESTORIL) 30 MG capsule Take 1 Cap by mouth at bedtime as needed for Sleep for up to 180 days. 90 Cap 1   • ALPRAZolam (XANAX) 0.5 MG Tab Take 1 Tab by mouth at bedtime as needed for Sleep for up to 180 days. 90 Tab 1   • pregabalin (LYRICA) 150 MG Cap      • dexamethasone (DECADRON) 1.5 MG Tab   0   • famotidine (PEPCID) 40 MG Tab Take 1 Tab by mouth 2 times a day. 180 Tab 2   • levothyroxine (SYNTHROID) 150 MCG Tab Take 1 Tab by mouth Every morning on an empty stomach. 90 Tab 1   • Influenza Vaccine Quad pf 0.5 ML Suspension Prefilled Syringe injection TO BE ADMINISTERED BY THE PHARMACIST FOR IMMUNIZATION     • gatifloxacin 0.5% (ZYMAXID) 0.5 % Solution      • albuterol 108 (90 Base) MCG/ACT Aero Soln inhalation aerosol Inhale 2 Puffs by mouth.     • digoxin (LANOXIN) 125 MCG Tab TAKE 1 TABLET BY MOUTH  DAILY.     • DILTIAZem (CARDIZEM) 30 MG Tab Take 1 tablet by mouth 4  times daily.     • Treprostinil 0.6 MG/ML Solution Inhale 12-15 Puffs by mouth.     • Folic Acid-Vit B6-Vit B12 (FOLBEE) 2.5-25-1 MG Tab Take 1 tablet by mouth every day. 90 Tab 1   • XARELTO 15 MG Tab tablet Take 1 Tab by mouth with dinner. 90 Tab 3   • potassium chloride (KLOR-CON) 20 MEQ Pack Take 100 mEq by mouth every day.     • albuterol 108 (90 Base) MCG/ACT Aero Soln inhalation aerosol Inhale 2 Puffs by mouth every 6 hours as needed for Shortness of Breath. 8.5 g 1   • Macitentan (OPSUMIT) 10 MG Tab Take  by mouth.     • tadalafil (CIALIS) 20 MG tablet      • Cyanocobalamin (VITAMIN B-12) 1000 MCG Tab Take 1,000 mcg by  mouth.     • digoxin (LANOXIN) 125 MCG Tab TAKE 1 TABLET BY MOUTH  DAILY.     • DILTIAZem (CARDIZEM) 30 MG Tab Take 1 tablet by mouth 4  times daily.     • Cholecalciferol (VITAMIN D3) 1000 units Cap Take 1,000 Units by mouth.     • Ascorbic Acid (VITAMIN C) 1000 MG Tab Take 1,000 mg by mouth.     • CALCIUM PO Take 500 mg by mouth.     • VIRT-GILLIAN 2.5-25-1 MG Tab TAKE 1 TABLET BY MOUTH  EVERY DAY 90 Tab 1   • betamethasone dipropionate (DIPROLENE) 0.05 % Cream Apply topically as needed     • Dermatological Products, Misc. (EPICERAM) Emulsion Apply  to affected area(s).     • MAGNESIUM-ZINC PO Take  by mouth.     • Ferrous Sulfate (IRON) 325 (65 Fe) MG Tab Take 1 Tab by mouth every day. 30 Tab 2   • fluticasone (FLONASE) 50 MCG/ACT nasal spray Spray 1 Spray in nose every day. (Patient not taking: Reported on 12/6/2019) 16 g 6   • sulfaSALAzine (AZULFIDINE) 500 MG Tab Take 500 mg by mouth. 1500 mg BID     • gabapentin (NEURONTIN) 800 MG tablet      • triamcinolone acetonide (KENALOG) 0.1 % Cream      • ANORO ELLIPTA 62.5-25 MCG/INH AEROSOL POWDER, BREATH ACTIVATED      • NON SPECIFIED Rx continuous O2 at 2L n/c  New O2 concentrator  Lowest O2 sat 82%  SOB without use of O2 1 Each 11   • NON SPECIFIED Rx continuous O2 at 2L n/c   New O2 concentrator  Lowest O2 sat 82% 1 Device 6   • calcium carbonate (OS-LUIS ARMANDO 500) 500 MG Tab Take 1,000 mg by mouth 2 times a day, with meals.     • furosemide (LASIX) 40 MG Tab Take 40 mg by mouth 3 times a day.     • hydroxychloroquine (PLAQUENIL) 200 MG Tab Take 200 mg by mouth 2 times a day.     • leflunomide (ARAVA) 20 MG Tab Take 20 mg by mouth every day.     • pantoprazole (PROTONIX) 40 MG Tablet Delayed Response Take 40 mg by mouth 2 times a day.     • tramadol (ULTRAM) 50 MG Tab Take 50 mg by mouth every four hours as needed.     • Treprostinil (TYVASO INH) Inhale 1.74 mg by mouth. Inhale 15 puffs orally four times daily  Indications: 1.74mg/2.9ml       No current  facility-administered medications for this visit.          Allergies as of 02/05/2020 - Reviewed 02/05/2020   Allergen Reaction Noted   • Latex Anaphylaxis and Rash 10/09/2013   • Erythrocin  05/01/2019   • Tape  06/13/2016   • Warfarin sodium Hives 06/13/2016   • Coumarin Hives and Rash 10/09/2013        ROS: As per HPI.  Denies all other cardiopulmonary, GI, neurologic symptoms.    /70 (BP Location: Right arm, Patient Position: Sitting)   Pulse 93   Temp 36.4 °C (97.6 °F) (Temporal)   Resp 16   Wt 71.7 kg (158 lb)   SpO2 91%   BMI 25.50 kg/m²     Physical Exam:  Gen:         Alert and oriented, No apparent distress.  No physical exam today.      Assessment and Plan.   69 y.o. female with the following issues.    1. Hypothyroidism due to acquired atrophy of thyroid  Patient to start taking levothyroxine 175 MCG's daily.  Check thyroid function panel between 2 to 3 months.    - THYROID PANEL WITH TSH    2. Sleep disorder  Sleep hygiene discussed  Patient may titrate up to 3 tablets of trazodone 50 mg tablets as needed.  Patient to monitor sleep with thyroid medication adjustment    - traZODone (DESYREL) 50 MG Tab; Take 3 tabs QHS  Dispense: 90 Tab; Refill: 8    3. Pulmonary arterial hypertension (HCC)  Medications reviewed  Follow-up with [place] in May    4. Rheumatoid arthritis involving multiple sites with positive rheumatoid factor (HCC)  Stable on current plan of care  Follow-up with rheumatology as scheduled    5. Gastroesophageal reflux disease with esophagitis    - pantoprazole (PROTONIX) 40 MG Tablet Delayed Response; Take 1 Tab by mouth every day.  Dispense: 90 Tab; Refill: 2    6. Medication management  All medications reviewed today  - rivaroxaban (XARELTO) 15 MG Tab tablet; Take 1 Tab by mouth with dinner.  Dispense: 90 Tab; Refill: 2            Please note that this dictation was created using voice recognition software. I have made every reasonable attempt to correct obvious errors, but  expect that there are errors of grammar and possible content that I did not discover before finalizing note.

## 2020-02-07 DIAGNOSIS — Z79.899 MEDICATION MANAGEMENT: ICD-10-CM

## 2020-02-07 NOTE — TELEPHONE ENCOUNTER
Received request via: Patient    Was the patient seen in the last year in this department? Yes    Does the patient have an active prescription (recently filled or refills available) for medication(s) requested? No     Requested Prescriptions     Pending Prescriptions Disp Refills   • Folic Acid-Vit B6-Vit B12 (FOLBEE) 2.5-25-1 MG Tab 90 Tab 1     Sig: Take 1 tablet by mouth every day.

## 2020-02-28 ENCOUNTER — TELEMEDICINE2 (OUTPATIENT)
Dept: MEDICAL GROUP | Age: 70
End: 2020-02-28
Payer: COMMERCIAL

## 2020-02-28 ENCOUNTER — TELEMEDICINE ORIGINATING SITE VISIT (OUTPATIENT)
Dept: MEDICAL GROUP | Facility: CLINIC | Age: 70
End: 2020-02-28
Payer: COMMERCIAL

## 2020-02-28 VITALS
BODY MASS INDEX: 25.9 KG/M2 | RESPIRATION RATE: 16 BRPM | HEART RATE: 67 BPM | OXYGEN SATURATION: 92 % | TEMPERATURE: 97.8 F | HEIGHT: 67 IN | DIASTOLIC BLOOD PRESSURE: 74 MMHG | SYSTOLIC BLOOD PRESSURE: 147 MMHG | WEIGHT: 165 LBS

## 2020-02-28 DIAGNOSIS — R30.0 DYSURIA: ICD-10-CM

## 2020-02-28 DIAGNOSIS — N39.0 RECURRENT URINARY TRACT INFECTION: ICD-10-CM

## 2020-02-28 LAB
APPEARANCE UR: NORMAL
BILIRUB UR STRIP-MCNC: NORMAL MG/DL
COLOR UR AUTO: YELLOW
GLUCOSE UR STRIP.AUTO-MCNC: NORMAL MG/DL
KETONES UR STRIP.AUTO-MCNC: NORMAL MG/DL
LEUKOCYTE ESTERASE UR QL STRIP.AUTO: NORMAL
NITRITE UR QL STRIP.AUTO: POSITIVE
PH UR STRIP.AUTO: 6.5 [PH] (ref 5–8)
PROT UR QL STRIP: NORMAL MG/DL
RBC UR QL AUTO: NORMAL
SP GR UR STRIP.AUTO: 1.02
UROBILINOGEN UR STRIP-MCNC: 1 MG/DL

## 2020-02-28 PROCEDURE — 81002 URINALYSIS NONAUTO W/O SCOPE: CPT | Performed by: INTERNAL MEDICINE

## 2020-02-28 PROCEDURE — 99213 OFFICE O/P EST LOW 20 MIN: CPT | Performed by: INTERNAL MEDICINE

## 2020-02-28 RX ORDER — CIPROFLOXACIN 500 MG/1
500 TABLET, FILM COATED ORAL 2 TIMES DAILY
Qty: 10 TAB | Refills: 0 | Status: SHIPPED | OUTPATIENT
Start: 2020-02-28

## 2020-02-28 NOTE — PROGRESS NOTES
CC: Urinary tract infection    Verified identification with patient. Secured video conference with RN presenter in [facility]      HPI:   Kaylynn presents today with the following.    1. Dysuria  Patient presents with a 2-week history of increased urinary frequency and stress urinary incontinence with dysuria.  Patient has been keeping well hydrated.  Patient was treated last month for a urinary tract infection in [place] urgent care and was prescribed Cipro for UTI.  Her symptoms resolved from her most recent treatment.  Patient denies fevers or chills, hematuria or abdominal pain.  Patient is here today to be evaluated by our clinic, [facility] will be leaving after today.  Patient does not wish to follow-up with an urgent care out of town.    2. Recurrent urinary tract infection  Patient is concerned as this is her fifth urinary tract infection in the last 6 to 8 months.  Patient is requesting a referral to her urologist in [place].      Patient Active Problem List    Diagnosis Date Noted   • Recurrent urinary tract infection 02/28/2020   • Papanicolaou smear 01/28/2020   • Dysuria 01/22/2020   • Sleep disorder 01/16/2020   • Anxiety 01/16/2020   • Chronic left shoulder pain 10/31/2019   • Tinnitus of both ears 09/04/2019   • Rash 09/04/2019   • Epigastric pain 08/21/2019   • Recurrent UTI (urinary tract infection) 05/29/2019   • Subacute maxillary sinusitis 05/15/2019   • Skin lesion of back 05/15/2019   • Pulmonary arterial hypertension (HCC) 04/24/2019   • History of deep venous thrombosis 04/24/2019   • History of pulmonary embolism 04/24/2019   • Chronic respiratory failure with hypoxia ([facility]) 04/24/2019   • Anticoagulation adequate 04/24/2019   • SVT (supraventricular tachycardia) ([facility]) 04/24/2019   • Palpitations 04/24/2019   • Anticoagulated 04/24/2019   • Low hemoglobin 10/17/2018   • Renal insufficiency 10/17/2018   • Chronic right shoulder pain 02/27/2018   • Gastroesophageal reflux disease  without esophagitis 02/27/2018   • Spinal stenosis of lumbar region with neurogenic claudication 12/28/2017   • Medication management 12/28/2017   • Abdominal discomfort, generalized 08/23/2017   • Hyperglycemia 05/03/2017   • SOB (shortness of breath) 04/05/2017   • Acute bronchitis due to Haemophilus influenzae 04/05/2017   • Lumbar discogenic pain syndrome 07/27/2016   • Dysphagia 07/27/2016   • Pulmonary hypertension ([facility]) 06/13/2016   • Rheumatoid arthritis involving multiple sites with positive rheumatoid factor ([facility]) 06/13/2016   • Hypertension 06/13/2016   • Neuropathy ([facility]) 06/13/2016   • Iron (Fe) deficiency anemia 06/13/2016   • Vitamin B12 deficiency 06/13/2016   • Hypothyroidism due to acquired atrophy of thyroid 06/13/2016   • Pulmonary embolism ([facility]) 06/13/2016   • Multiple thyroid nodules 06/13/2016   • DVT of popliteal vein ([facility]) 10/09/2013   • History of renal insufficiency syndrome 10/09/2013       Current Outpatient Medications   Medication Sig Dispense Refill   • ciprofloxacin (CIPRO) 500 MG Tab Take 1 Tab by mouth 2 times a day. 10 Tab 0   • FOLBEE 2.5-25-1 MG Tab TAKE 1 TABLET BY MOUTH  EVERY DAY 90 Tab 1   • Folic Acid-Vit B6-Vit B12 (FOLBEE) 2.5-25-1 MG Tab Take 1 tablet by mouth every day. 90 Tab 1   • traZODone (DESYREL) 50 MG Tab Take 3 tabs QHS 90 Tab 8   • pantoprazole (PROTONIX) 40 MG Tablet Delayed Response Take 1 Tab by mouth every day. 90 Tab 2   • rivaroxaban (XARELTO) 15 MG Tab tablet Take 1 Tab by mouth with dinner. 90 Tab 2   • chlorhexidine (PERIDEX) 0.12 % Solution      • potassium chloride SA (KDUR) 20 MEQ Tab CR Take six tablets po once per day.     • DILTIAZem (CARDIZEM) 30 MG Tab TAKE 1 TABLET BY MOUTH 4  TIMES DAILY     • famotidine (PEPCID) 40 MG Tab Take 40 mg by mouth.     • Macitentan 10 MG Tab TAKE 1 TABLET (10MG) BY MOUTH DAILY.     • traZODone (DESYREL) 50 MG Tab Take 1 to 2 tablets p.o. nightly as needed 60 Tab 5   • Levothyroxine Sodium 175 MCG Cap Take 175 mcg  by mouth every day. 90 Cap 1   • ALPRAZolam (XANAX) 0.5 MG Tab Take 1 Tab by mouth at bedtime as needed for Sleep for up to 90 days. 30 Tab 2   • temazepam (RESTORIL) 30 MG capsule Take 1 Cap by mouth at bedtime as needed for Sleep for up to 180 days. 90 Cap 1   • ALPRAZolam (XANAX) 0.5 MG Tab Take 1 Tab by mouth at bedtime as needed for Sleep for up to 180 days. 90 Tab 1   • pregabalin (LYRICA) 150 MG Cap      • dexamethasone (DECADRON) 1.5 MG Tab   0   • famotidine (PEPCID) 40 MG Tab Take 1 Tab by mouth 2 times a day. 180 Tab 2   • levothyroxine (SYNTHROID) 150 MCG Tab Take 1 Tab by mouth Every morning on an empty stomach. 90 Tab 1   • Influenza Vaccine Quad pf 0.5 ML Suspension Prefilled Syringe injection TO BE ADMINISTERED BY THE PHARMACIST FOR IMMUNIZATION     • gatifloxacin 0.5% (ZYMAXID) 0.5 % Solution      • albuterol 108 (90 Base) MCG/ACT Aero Soln inhalation aerosol Inhale 2 Puffs by mouth.     • digoxin (LANOXIN) 125 MCG Tab TAKE 1 TABLET BY MOUTH  DAILY.     • DILTIAZem (CARDIZEM) 30 MG Tab Take 1 tablet by mouth 4  times daily.     • Treprostinil 0.6 MG/ML Solution Inhale 12-15 Puffs by mouth.     • XARELTO 15 MG Tab tablet Take 1 Tab by mouth with dinner. 90 Tab 3   • potassium chloride (KLOR-CON) 20 MEQ Pack Take 100 mEq by mouth every day.     • albuterol 108 (90 Base) MCG/ACT Aero Soln inhalation aerosol Inhale 2 Puffs by mouth every 6 hours as needed for Shortness of Breath. 8.5 g 1   • Macitentan (OPSUMIT) 10 MG Tab Take  by mouth.     • tadalafil (CIALIS) 20 MG tablet      • Cyanocobalamin (VITAMIN B-12) 1000 MCG Tab Take 1,000 mcg by mouth.     • digoxin (LANOXIN) 125 MCG Tab TAKE 1 TABLET BY MOUTH  DAILY.     • DILTIAZem (CARDIZEM) 30 MG Tab Take 1 tablet by mouth 4  times daily.     • Cholecalciferol (VITAMIN D3) 1000 units Cap Take 1,000 Units by mouth.     • Ascorbic Acid (VITAMIN C) 1000 MG Tab Take 1,000 mg by mouth.     • CALCIUM PO Take 500 mg by mouth.     • VIRT-GILLIAN 2.5-25-1 MG Tab  TAKE 1 TABLET BY MOUTH  EVERY DAY 90 Tab 1   • betamethasone dipropionate (DIPROLENE) 0.05 % Cream Apply topically as needed     • Dermatological Products, Misc. (EPICERAM) Emulsion Apply  to affected area(s).     • MAGNESIUM-ZINC PO Take  by mouth.     • Ferrous Sulfate (IRON) 325 (65 Fe) MG Tab Take 1 Tab by mouth every day. 30 Tab 2   • fluticasone (FLONASE) 50 MCG/ACT nasal spray Spray 1 Spray in nose every day. (Patient not taking: Reported on 12/6/2019) 16 g 6   • sulfaSALAzine (AZULFIDINE) 500 MG Tab Take 500 mg by mouth. 1500 mg BID     • gabapentin (NEURONTIN) 800 MG tablet      • triamcinolone acetonide (KENALOG) 0.1 % Cream      • ANORO ELLIPTA 62.5-25 MCG/INH AEROSOL POWDER, BREATH ACTIVATED      • NON SPECIFIED Rx continuous O2 at 2L n/c  New O2 concentrator  Lowest O2 sat 82%  SOB without use of O2 1 Each 11   • NON SPECIFIED Rx continuous O2 at 2L n/c   New O2 concentrator  Lowest O2 sat 82% 1 Device 6   • calcium carbonate (OS-LUIS ARMANDO 500) 500 MG Tab Take 1,000 mg by mouth 2 times a day, with meals.     • furosemide (LASIX) 40 MG Tab Take 40 mg by mouth 3 times a day.     • hydroxychloroquine (PLAQUENIL) 200 MG Tab Take 200 mg by mouth 2 times a day.     • leflunomide (ARAVA) 20 MG Tab Take 20 mg by mouth every day.     • pantoprazole (PROTONIX) 40 MG Tablet Delayed Response Take 40 mg by mouth 2 times a day.     • tramadol (ULTRAM) 50 MG Tab Take 50 mg by mouth every four hours as needed.     • Treprostinil (TYVASO INH) Inhale 1.74 mg by mouth. Inhale 15 puffs orally four times daily  Indications: 1.74mg/2.9ml       No current facility-administered medications for this visit.          Allergies as of 02/28/2020 - Reviewed 02/28/2020   Allergen Reaction Noted   • Latex Anaphylaxis and Rash 10/09/2013   • Erythrocin  05/01/2019   • Tape  06/13/2016   • Warfarin sodium Hives 06/13/2016   • Coumarin Hives and Rash 10/09/2013        ROS: As per HPI.  Denies all other cardiopulmonary, GI, neurologic  "symptoms.    /74 (BP Location: Right arm, Patient Position: Sitting, BP Cuff Size: Adult)   Pulse 67   Temp 36.6 °C (97.8 °F) (Temporal)   Resp 16   Ht 1.702 m (5' 7\")   Wt 74.8 kg (165 lb)   SpO2 92%   BMI 25.84 kg/m²     Physical Exam:  Gen:         Alert and oriented, No apparent distress.  Neck:        No Lymphadenopathy or Bruits.  Neck is supple.  Lungs:     Clear to auscultation bilaterally, no wheezing rhonchi or crackles  CV:          Regular rate and rhythm. No murmurs, rubs or gallops.  Abd:         Mild suprapubic tenderness to deep palpation, no rebound no guarding., non distended. Normal active bowel sounds.  No CVA tenderness bilaterally.    Ext:          No clubbing, cyanosis, edema.      Assessment and Plan.   69 y.o. female with the following issues.    1. Dysuria  Urinalysis showed positive protein, positive nitrites, positive leukocyte esterase, large amount of blood.  We are unable to send for culture and sensitivity as the clinic will be closing.  Treat with ciprofloxacin x5 days  Keep well-hydrated  Referral to urology  Urgent care/ER precautions provided    - POCT Urinalysis  - ciprofloxacin (CIPRO) 500 MG Tab; Take 1 Tab by mouth 2 times a day.  Dispense: 10 Tab; Refill: 0    2. Recurrent urinary tract infection    - ciprofloxacin (CIPRO) 500 MG Tab; Take 1 Tab by mouth 2 times a day.  Dispense: 10 Tab; Refill: 0  - REFERRAL TO UROLOGY            Please note that this dictation was created using voice recognition software. I have made every reasonable attempt to correct obvious errors, but expect that there are errors of grammar and possible content that I did not discover before finalizing note.   "

## 2020-05-13 ENCOUNTER — TELEPHONE (OUTPATIENT)
Dept: MEDICAL GROUP | Facility: PHYSICIAN GROUP | Age: 70
End: 2020-05-13

## 2020-05-13 NOTE — TELEPHONE ENCOUNTER
Please call patient and inquire if she will continue follow-up primary care visits with me at [facility] or if she has established care with the local clinic in [place].  Patient has recently completed lab work.  If patient wishes, please schedule a virtual follow-up visit with me.  Thank you

## 2022-08-30 ENCOUNTER — HOSPITAL ENCOUNTER (OUTPATIENT)
Dept: RESPIRATORY THERAPY | Facility: HOSPITAL | Age: 72
Discharge: HOME | End: 2022-08-30
Payer: MEDICARE

## 2022-08-30 DIAGNOSIS — I27.21 PULMONARY ARTERY HYPERTENSION (CMS/HCC): ICD-10-CM

## 2022-08-30 PROCEDURE — 94729 DIFFUSING CAPACITY: CPT | Mod: 26 | Performed by: INTERNAL MEDICINE

## 2022-08-30 PROCEDURE — 94010 BREATHING CAPACITY TEST: CPT | Mod: 26 | Performed by: INTERNAL MEDICINE

## 2022-08-30 PROCEDURE — 94010 BREATHING CAPACITY TEST: CPT
